# Patient Record
Sex: FEMALE | Race: WHITE
[De-identification: names, ages, dates, MRNs, and addresses within clinical notes are randomized per-mention and may not be internally consistent; named-entity substitution may affect disease eponyms.]

---

## 2021-10-05 ENCOUNTER — HOSPITAL ENCOUNTER (EMERGENCY)
Dept: HOSPITAL 94 - ER | Age: 56
Discharge: HOME | End: 2021-10-05
Payer: MEDICAID

## 2021-10-05 VITALS — BODY MASS INDEX: 37.04 KG/M2 | HEIGHT: 66 IN | WEIGHT: 230.49 LBS

## 2021-10-05 VITALS — DIASTOLIC BLOOD PRESSURE: 96 MMHG | SYSTOLIC BLOOD PRESSURE: 117 MMHG

## 2021-10-05 DIAGNOSIS — U07.1: Primary | ICD-10-CM

## 2021-10-05 DIAGNOSIS — Z88.8: ICD-10-CM

## 2021-10-05 DIAGNOSIS — G89.29: ICD-10-CM

## 2021-10-05 DIAGNOSIS — Z79.899: ICD-10-CM

## 2021-10-05 DIAGNOSIS — Z79.2: ICD-10-CM

## 2021-10-05 DIAGNOSIS — J96.01: ICD-10-CM

## 2021-10-05 DIAGNOSIS — Z88.1: ICD-10-CM

## 2021-10-05 DIAGNOSIS — J12.82: ICD-10-CM

## 2021-10-05 LAB
ALBUMIN SERPL BCP-MCNC: 3.3 G/DL (ref 3.4–5)
ALBUMIN/GLOB SERPL: 0.8 {RATIO} (ref 1.1–1.5)
ALP SERPL-CCNC: 94 IU/L (ref 46–116)
ALT SERPL W P-5'-P-CCNC: 32 U/L (ref 12–78)
AMORPH URATE CRY #/AREA URNS HPF: (no result) /[HPF]
ANION GAP SERPL CALCULATED.3IONS-SCNC: 15 MMOL/L (ref 8–16)
AST SERPL W P-5'-P-CCNC: 106 U/L (ref 10–37)
BACTERIA URNS QL MICRO: (no result) /HPF
BASOPHILS # BLD AUTO: 0 X10'3 (ref 0–0.2)
BASOPHILS NFR BLD AUTO: 0.4 % (ref 0–1)
BILIRUB SERPL-MCNC: 0.2 MG/DL (ref 0.1–1)
BUN SERPL-MCNC: 32 MG/DL (ref 7–18)
BUN/CREAT SERPL: 23.9 (ref 6.6–38)
CALCIUM SERPL-MCNC: 8.4 MG/DL (ref 8.5–10.1)
CHLORIDE SERPL-SCNC: 99 MMOL/L (ref 99–107)
CLARITY UR: (no result)
CO2 SERPL-SCNC: 19.2 MMOL/L (ref 24–32)
COLOR UR: YELLOW
CREAT SERPL-MCNC: 1.34 MG/DL (ref 0.4–0.9)
CRP SERPL HS-MCNC: 9.75 MG/DL (ref 0–0.5)
D DIMER PPP FEU-MCNC: 1.46 MG/L FEU (ref 0–0.5)
DEPRECATED SQUAMOUS URNS QL MICRO: (no result) /LPF
EOSINOPHIL # BLD AUTO: 0 X10'3 (ref 0–0.9)
EOSINOPHIL NFR BLD AUTO: 0 % (ref 0–6)
ERYTHROCYTE [DISTWIDTH] IN BLOOD BY AUTOMATED COUNT: 13.5 % (ref 11.5–14.5)
GFR SERPL CREATININE-BSD FRML MDRD: 41 ML/MIN
GLUCOSE SERPL-MCNC: 119 MG/DL (ref 70–104)
GLUCOSE UR STRIP-MCNC: NEGATIVE MG/DL
HCT VFR BLD AUTO: 39.4 % (ref 35–45)
HGB BLD-MCNC: 13.5 G/DL (ref 12–16)
HGB UR QL STRIP: (no result)
HYALINE CASTS URNS QL MICRO: (no result) /LPF
KETONES UR STRIP-MCNC: (no result) MG/DL
LEUKOCYTE ESTERASE UR QL STRIP: NEGATIVE
LYMPHOCYTES # BLD AUTO: 0.5 X10'3 (ref 1.1–4.8)
LYMPHOCYTES NFR BLD AUTO: 16.9 % (ref 21–51)
MCH RBC QN AUTO: 30.3 PG (ref 27–31)
MCHC RBC AUTO-ENTMCNC: 34.3 G/DL (ref 33–36.5)
MCV RBC AUTO: 88.2 FL (ref 78–98)
MONOCYTES # BLD AUTO: 0.1 X10'3 (ref 0–0.9)
MONOCYTES NFR BLD AUTO: 2.4 % (ref 2–12)
MUCOUS THREADS URNS QL MICRO: (no result) /LPF
NEUTROPHILS # BLD AUTO: 2.6 X10'3 (ref 1.8–7.7)
NEUTROPHILS NFR BLD AUTO: 80.3 % (ref 42–75)
NITRITE UR QL STRIP: NEGATIVE
PH UR STRIP: 6 [PH] (ref 4.8–8)
PLATELET # BLD AUTO: 163 X10'3 (ref 140–440)
PMV BLD AUTO: 8.4 FL (ref 7.4–10.4)
POTASSIUM SERPL-SCNC: 3.4 MMOL/L (ref 3.5–5.1)
PROT SERPL-MCNC: 7.5 G/DL (ref 6.4–8.2)
PROT UR QL STRIP: 100 MG/DL
RBC # BLD AUTO: 4.46 X10'6 (ref 4.2–5.6)
RBC #/AREA URNS HPF: (no result) /HPF (ref 0–2)
SODIUM SERPL-SCNC: 133 MMOL/L (ref 135–145)
SP GR UR STRIP: 1.02 (ref 1–1.03)
URN COLLECT METHOD CLASS: (no result)
UROBILINOGEN UR STRIP-MCNC: 0.2 E.U/DL (ref 0.2–1)
WBC # BLD AUTO: 3.3 X10'3 (ref 4.5–11)
WBC #/AREA URNS HPF: (no result) /HPF (ref 0–4)

## 2021-10-05 PROCEDURE — 84145 PROCALCITONIN (PCT): CPT

## 2021-10-05 PROCEDURE — 86140 C-REACTIVE PROTEIN: CPT

## 2021-10-05 PROCEDURE — 96361 HYDRATE IV INFUSION ADD-ON: CPT

## 2021-10-05 PROCEDURE — 85379 FIBRIN DEGRADATION QUANT: CPT

## 2021-10-05 PROCEDURE — 87088 URINE BACTERIA CULTURE: CPT

## 2021-10-05 PROCEDURE — 96374 THER/PROPH/DIAG INJ IV PUSH: CPT

## 2021-10-05 PROCEDURE — 81001 URINALYSIS AUTO W/SCOPE: CPT

## 2021-10-05 PROCEDURE — 99284 EMERGENCY DEPT VISIT MOD MDM: CPT

## 2021-10-05 PROCEDURE — 85025 COMPLETE CBC W/AUTO DIFF WBC: CPT

## 2021-10-05 PROCEDURE — 36415 COLL VENOUS BLD VENIPUNCTURE: CPT

## 2021-10-05 PROCEDURE — 87635 SARS-COV-2 COVID-19 AMP PRB: CPT

## 2021-10-05 PROCEDURE — 96375 TX/PRO/DX INJ NEW DRUG ADDON: CPT

## 2021-10-05 PROCEDURE — 80053 COMPREHEN METABOLIC PANEL: CPT

## 2021-10-05 PROCEDURE — 71045 X-RAY EXAM CHEST 1 VIEW: CPT

## 2021-10-07 ENCOUNTER — HOSPITAL ENCOUNTER (INPATIENT)
Dept: HOSPITAL 94 - ER | Age: 56
LOS: 32 days | DRG: 130 | End: 2021-11-08
Attending: FAMILY MEDICINE | Admitting: FAMILY MEDICINE
Payer: MEDICAID

## 2021-10-07 VITALS — DIASTOLIC BLOOD PRESSURE: 68 MMHG | SYSTOLIC BLOOD PRESSURE: 125 MMHG

## 2021-10-07 VITALS — HEIGHT: 66 IN | WEIGHT: 231.49 LBS | BODY MASS INDEX: 37.2 KG/M2

## 2021-10-07 VITALS — SYSTOLIC BLOOD PRESSURE: 120 MMHG | DIASTOLIC BLOOD PRESSURE: 59 MMHG

## 2021-10-07 DIAGNOSIS — U07.1: Primary | ICD-10-CM

## 2021-10-07 DIAGNOSIS — M54.9: ICD-10-CM

## 2021-10-07 DIAGNOSIS — Z88.8: ICD-10-CM

## 2021-10-07 DIAGNOSIS — G89.29: ICD-10-CM

## 2021-10-07 DIAGNOSIS — Z51.5: ICD-10-CM

## 2021-10-07 DIAGNOSIS — Z87.891: ICD-10-CM

## 2021-10-07 DIAGNOSIS — K21.9: ICD-10-CM

## 2021-10-07 DIAGNOSIS — R65.21: ICD-10-CM

## 2021-10-07 DIAGNOSIS — I95.9: ICD-10-CM

## 2021-10-07 DIAGNOSIS — F41.0: ICD-10-CM

## 2021-10-07 DIAGNOSIS — F40.240: ICD-10-CM

## 2021-10-07 DIAGNOSIS — E87.6: ICD-10-CM

## 2021-10-07 DIAGNOSIS — A41.02: ICD-10-CM

## 2021-10-07 DIAGNOSIS — B95.62: ICD-10-CM

## 2021-10-07 DIAGNOSIS — R74.01: ICD-10-CM

## 2021-10-07 DIAGNOSIS — Z79.899: ICD-10-CM

## 2021-10-07 DIAGNOSIS — J12.82: ICD-10-CM

## 2021-10-07 DIAGNOSIS — E66.9: ICD-10-CM

## 2021-10-07 DIAGNOSIS — R73.9: ICD-10-CM

## 2021-10-07 DIAGNOSIS — Z78.1: ICD-10-CM

## 2021-10-07 DIAGNOSIS — Z90.710: ICD-10-CM

## 2021-10-07 DIAGNOSIS — Z88.5: ICD-10-CM

## 2021-10-07 DIAGNOSIS — I21.A1: ICD-10-CM

## 2021-10-07 DIAGNOSIS — R00.0: ICD-10-CM

## 2021-10-07 DIAGNOSIS — J96.01: ICD-10-CM

## 2021-10-07 DIAGNOSIS — J15.212: ICD-10-CM

## 2021-10-07 DIAGNOSIS — D64.9: ICD-10-CM

## 2021-10-07 LAB
ALBUMIN SERPL BCP-MCNC: 3.3 G/DL (ref 3.4–5)
ALBUMIN/GLOB SERPL: 0.8 {RATIO} (ref 1.1–1.5)
ALP SERPL-CCNC: 117 IU/L (ref 46–116)
ALT SERPL W P-5'-P-CCNC: 247 U/L (ref 12–78)
ANION GAP SERPL CALCULATED.3IONS-SCNC: 14 MMOL/L (ref 8–16)
AST SERPL W P-5'-P-CCNC: 293 U/L (ref 10–37)
BASOPHILS # BLD AUTO: 0 X10'3 (ref 0–0.2)
BASOPHILS NFR BLD AUTO: 0.1 % (ref 0–1)
BILIRUB SERPL-MCNC: 0.2 MG/DL (ref 0.1–1)
BUN SERPL-MCNC: 18 MG/DL (ref 7–18)
BUN/CREAT SERPL: 23.4 (ref 6.6–38)
CALCIUM SERPL-MCNC: 8.9 MG/DL (ref 8.5–10.1)
CHLORIDE SERPL-SCNC: 105 MMOL/L (ref 99–107)
CO2 SERPL-SCNC: 21.5 MMOL/L (ref 24–32)
CREAT SERPL-MCNC: 0.77 MG/DL (ref 0.4–0.9)
CRP SERPL HS-MCNC: 1.21 MG/DL (ref 0–0.5)
D DIMER PPP FEU-MCNC: 2.19 MG/L FEU (ref 0–0.5)
EOSINOPHIL # BLD AUTO: 0 X10'3 (ref 0–0.9)
EOSINOPHIL NFR BLD AUTO: 0 % (ref 0–6)
ERYTHROCYTE [DISTWIDTH] IN BLOOD BY AUTOMATED COUNT: 14 % (ref 11.5–14.5)
GFR SERPL CREATININE-BSD FRML MDRD: 78 ML/MIN
GLUCOSE SERPL-MCNC: 138 MG/DL (ref 70–104)
HCT VFR BLD AUTO: 40 % (ref 35–45)
HGB BLD-MCNC: 13.7 G/DL (ref 12–16)
LDH SERPL-CCNC: 1717 U/L (ref 81–234)
LYMPHOCYTES # BLD AUTO: 0.6 X10'3 (ref 1.1–4.8)
LYMPHOCYTES NFR BLD AUTO: 12.3 % (ref 21–51)
MAGNESIUM SERPL-MCNC: 2 MG/DL (ref 1.5–2.4)
MCH RBC QN AUTO: 30.2 PG (ref 27–31)
MCHC RBC AUTO-ENTMCNC: 34.4 G/DL (ref 33–36.5)
MCV RBC AUTO: 87.8 FL (ref 78–98)
MONOCYTES # BLD AUTO: 0.3 X10'3 (ref 0–0.9)
MONOCYTES NFR BLD AUTO: 6 % (ref 2–12)
NEUTROPHILS # BLD AUTO: 4 X10'3 (ref 1.8–7.7)
NEUTROPHILS NFR BLD AUTO: 81.6 % (ref 42–75)
PLATELET # BLD AUTO: 191 X10'3 (ref 140–440)
PMV BLD AUTO: 8.2 FL (ref 7.4–10.4)
POTASSIUM SERPL-SCNC: 3.9 MMOL/L (ref 3.5–5.1)
PROT SERPL-MCNC: 7.4 G/DL (ref 6.4–8.2)
RBC # BLD AUTO: 4.55 X10'6 (ref 4.2–5.6)
SODIUM SERPL-SCNC: 140 MMOL/L (ref 135–145)
WBC # BLD AUTO: 4.9 X10'3 (ref 4.5–11)

## 2021-10-07 PROCEDURE — 36600 WITHDRAWAL OF ARTERIAL BLOOD: CPT

## 2021-10-07 PROCEDURE — 84484 ASSAY OF TROPONIN QUANT: CPT

## 2021-10-07 PROCEDURE — 80202 ASSAY OF VANCOMYCIN: CPT

## 2021-10-07 PROCEDURE — 76700 US EXAM ABDOM COMPLETE: CPT

## 2021-10-07 PROCEDURE — 71275 CT ANGIOGRAPHY CHEST: CPT

## 2021-10-07 PROCEDURE — 92950 HEART/LUNG RESUSCITATION CPR: CPT

## 2021-10-07 PROCEDURE — 84478 ASSAY OF TRIGLYCERIDES: CPT

## 2021-10-07 PROCEDURE — 84134 ASSAY OF PREALBUMIN: CPT

## 2021-10-07 PROCEDURE — 86140 C-REACTIVE PROTEIN: CPT

## 2021-10-07 PROCEDURE — 94640 AIRWAY INHALATION TREATMENT: CPT

## 2021-10-07 PROCEDURE — 85025 COMPLETE CBC W/AUTO DIFF WBC: CPT

## 2021-10-07 PROCEDURE — 93971 EXTREMITY STUDY: CPT

## 2021-10-07 PROCEDURE — B3201ZZ COMPUTERIZED TOMOGRAPHY (CT SCAN) OF THORACIC AORTA USING LOW OSMOLAR CONTRAST: ICD-10-PCS

## 2021-10-07 PROCEDURE — 93306 TTE W/DOPPLER COMPLETE: CPT

## 2021-10-07 PROCEDURE — 82803 BLOOD GASES ANY COMBINATION: CPT

## 2021-10-07 PROCEDURE — 36415 COLL VENOUS BLD VENIPUNCTURE: CPT

## 2021-10-07 PROCEDURE — 87077 CULTURE AEROBIC IDENTIFY: CPT

## 2021-10-07 PROCEDURE — 80053 COMPREHEN METABOLIC PANEL: CPT

## 2021-10-07 PROCEDURE — 76937 US GUIDE VASCULAR ACCESS: CPT

## 2021-10-07 PROCEDURE — 85610 PROTHROMBIN TIME: CPT

## 2021-10-07 PROCEDURE — 97110 THERAPEUTIC EXERCISES: CPT

## 2021-10-07 PROCEDURE — B32S1ZZ COMPUTERIZED TOMOGRAPHY (CT SCAN) OF RIGHT PULMONARY ARTERY USING LOW OSMOLAR CONTRAST: ICD-10-PCS

## 2021-10-07 PROCEDURE — 87081 CULTURE SCREEN ONLY: CPT

## 2021-10-07 PROCEDURE — 93970 EXTREMITY STUDY: CPT

## 2021-10-07 PROCEDURE — 83735 ASSAY OF MAGNESIUM: CPT

## 2021-10-07 PROCEDURE — 83605 ASSAY OF LACTIC ACID: CPT

## 2021-10-07 PROCEDURE — 93005 ELECTROCARDIOGRAM TRACING: CPT

## 2021-10-07 PROCEDURE — B32T1ZZ COMPUTERIZED TOMOGRAPHY (CT SCAN) OF LEFT PULMONARY ARTERY USING LOW OSMOLAR CONTRAST: ICD-10-PCS

## 2021-10-07 PROCEDURE — 85018 HEMOGLOBIN: CPT

## 2021-10-07 PROCEDURE — 99285 EMERGENCY DEPT VISIT HI MDM: CPT

## 2021-10-07 PROCEDURE — 85027 COMPLETE CBC AUTOMATED: CPT

## 2021-10-07 PROCEDURE — 36573 INSJ PICC RS&I 5 YR+: CPT

## 2021-10-07 PROCEDURE — 71045 X-RAY EXAM CHEST 1 VIEW: CPT

## 2021-10-07 PROCEDURE — 85007 BL SMEAR W/DIFF WBC COUNT: CPT

## 2021-10-07 PROCEDURE — 84100 ASSAY OF PHOSPHORUS: CPT

## 2021-10-07 PROCEDURE — 94760 N-INVAS EAR/PLS OXIMETRY 1: CPT

## 2021-10-07 PROCEDURE — 87040 BLOOD CULTURE FOR BACTERIA: CPT

## 2021-10-07 PROCEDURE — 85730 THROMBOPLASTIN TIME PARTIAL: CPT

## 2021-10-07 PROCEDURE — 82948 REAGENT STRIP/BLOOD GLUCOSE: CPT

## 2021-10-07 PROCEDURE — 83615 LACTATE (LD) (LDH) ENZYME: CPT

## 2021-10-07 PROCEDURE — 87070 CULTURE OTHR SPECIMN AEROBIC: CPT

## 2021-10-07 PROCEDURE — 87186 SC STD MICRODIL/AGAR DIL: CPT

## 2021-10-07 PROCEDURE — 5A0935A ASSISTANCE WITH RESPIRATORY VENTILATION, LESS THAN 24 CONSECUTIVE HOURS, HIGH NASAL FLOW/VELOCITY: ICD-10-PCS | Performed by: INTERNAL MEDICINE

## 2021-10-07 PROCEDURE — 81001 URINALYSIS AUTO W/SCOPE: CPT

## 2021-10-07 PROCEDURE — 83880 ASSAY OF NATRIURETIC PEPTIDE: CPT

## 2021-10-07 PROCEDURE — 94002 VENT MGMT INPAT INIT DAY: CPT

## 2021-10-07 PROCEDURE — 97535 SELF CARE MNGMENT TRAINING: CPT

## 2021-10-07 PROCEDURE — 97530 THERAPEUTIC ACTIVITIES: CPT

## 2021-10-07 PROCEDURE — 83036 HEMOGLOBIN GLYCOSYLATED A1C: CPT

## 2021-10-07 PROCEDURE — 94003 VENT MGMT INPAT SUBQ DAY: CPT

## 2021-10-07 PROCEDURE — 85379 FIBRIN DEGRADATION QUANT: CPT

## 2021-10-07 PROCEDURE — 94660 CPAP INITIATION&MGMT: CPT

## 2021-10-07 PROCEDURE — 84132 ASSAY OF SERUM POTASSIUM: CPT

## 2021-10-07 PROCEDURE — 80048 BASIC METABOLIC PNL TOTAL CA: CPT

## 2021-10-07 PROCEDURE — 94799 UNLISTED PULMONARY SVC/PX: CPT

## 2021-10-07 PROCEDURE — 84145 PROCALCITONIN (PCT): CPT

## 2021-10-07 RX ADMIN — DOCUSATE SODIUM SCH MG: 100 CAPSULE, LIQUID FILLED ORAL at 21:22

## 2021-10-07 RX ADMIN — SODIUM CHLORIDE SCH MLS/HR: 9 INJECTION INTRAMUSCULAR; INTRAVENOUS; SUBCUTANEOUS at 15:47

## 2021-10-07 RX ADMIN — HYDROMORPHONE HYDROCHLORIDE PRN MG: 1 INJECTION, SOLUTION INTRAMUSCULAR; INTRAVENOUS; SUBCUTANEOUS at 21:25

## 2021-10-07 RX ADMIN — ENOXAPARIN SODIUM SCH MG: 100 INJECTION SUBCUTANEOUS at 21:24

## 2021-10-07 RX ADMIN — GUAIFENESIN AND DEXTROMETHORPHAN PRN ML: 100; 10 SYRUP ORAL at 18:00

## 2021-10-07 RX ADMIN — DEXTROSE SCH MLS/HR: 50 INJECTION, SOLUTION INTRAVENOUS at 21:24

## 2021-10-07 NOTE — NUR
ALSO HAS 15LITERS OF OXYGEN THROUGH NRB MASK ON 

-------------------------------------------------------------------------------

Addendum: 10/07/21 at 2155 by Poly Marquez RN

-------------------------------------------------------------------------------

Amended: Links added.

## 2021-10-07 NOTE — NUR
Patient in room ORTHO 4012. I have received report from ABISAI Leon   and had the 
opportunity to ask questions and assume patient care.

## 2021-10-07 NOTE — NUR
Patient was found on her knees on the floor next to the bedside commode, called charge nurse 
and with her assistance patient was transferred back to bed. Skin intact at this time, able 
to move all extremities without difficulty. Bed alarm in place now, bed in lowest position, 
side rails in place. Instructed patient to use call light for assistance. Dr Salgado was 
made aware by charge nurse. Will continue to monitor, call light within reach.

## 2021-10-07 NOTE — NUR
SENT MESSAGE TO RESPIRATORY...



1968E...PT COMING UP FROM ER, ON 15L...CAN I GET A SALTER FOR THE ROOM PLEASE AND THANK YOU

## 2021-10-07 NOTE — NUR
Patient was found on the floor by her nurse, Soledad BARONE. I called Dr. Salgado to inform him 
of the fall and that her oxygen saturation had dropped to 56% without oxygen. I applied the 
15liters high flow and then added a 15liter NRB mask because her oxygen saturation was 
hanging out around 75% and it slowly went up to 91% with both the HF and NRB mask on. I set 
the bed alarm on her bed and reinforced that she needed to call for help to the bsc and not 
to get out of bed on her own. There was no injury noted to her body and no open sores or 
abrasions, she was found on her knees by the bedside commode. She verbalized that she stood 
up from [facility] and she just became weak and fell onto her knees. I asked the doctor for Toradol 
per her request for pain but he declined since she is on steroids and it would increase risk 
of gi bleed.

## 2021-10-07 NOTE — NUR
Problems reprioritized. Patient report given, questions answered & plan of care reviewed 
with ABISAI GOMEZ.

## 2021-10-07 NOTE — NUR
I have received report from  ABISAI NGUYỄN IN ER  and had the opportunity to ask questions. 
WILL AWAIT PTS ARRIVAL TO THE FLOOR.

## 2021-10-07 NOTE — NUR
IV FLUIDS NOT INFUSING DUE TO IV BEING IN R AC, PT KEEPS BENDING ARM. WILL HAVE NOC RN PLACE 
NEW IV AND RESTART ABX AND FLUID.

## 2021-10-08 VITALS — SYSTOLIC BLOOD PRESSURE: 117 MMHG | DIASTOLIC BLOOD PRESSURE: 63 MMHG

## 2021-10-08 VITALS — SYSTOLIC BLOOD PRESSURE: 124 MMHG | DIASTOLIC BLOOD PRESSURE: 63 MMHG

## 2021-10-08 VITALS — DIASTOLIC BLOOD PRESSURE: 63 MMHG | SYSTOLIC BLOOD PRESSURE: 117 MMHG

## 2021-10-08 VITALS — DIASTOLIC BLOOD PRESSURE: 73 MMHG | SYSTOLIC BLOOD PRESSURE: 103 MMHG

## 2021-10-08 VITALS — DIASTOLIC BLOOD PRESSURE: 51 MMHG | SYSTOLIC BLOOD PRESSURE: 118 MMHG

## 2021-10-08 VITALS — DIASTOLIC BLOOD PRESSURE: 53 MMHG | SYSTOLIC BLOOD PRESSURE: 106 MMHG

## 2021-10-08 VITALS — SYSTOLIC BLOOD PRESSURE: 116 MMHG | DIASTOLIC BLOOD PRESSURE: 65 MMHG

## 2021-10-08 LAB
ALBUMIN SERPL BCP-MCNC: 2.9 G/DL (ref 3.4–5)
ALBUMIN/GLOB SERPL: 0.8 {RATIO} (ref 1.1–1.5)
ALP SERPL-CCNC: 113 IU/L (ref 46–116)
ALT SERPL W P-5'-P-CCNC: 173 U/L (ref 12–78)
ANION GAP SERPL CALCULATED.3IONS-SCNC: 12 MMOL/L (ref 8–16)
APTT PPP: 26 SECONDS (ref 22–32)
AST SERPL W P-5'-P-CCNC: 152 U/L (ref 10–37)
BASOPHILS # BLD AUTO: 0 X10'3 (ref 0–0.2)
BASOPHILS NFR BLD AUTO: 0.1 % (ref 0–1)
BILIRUB SERPL-MCNC: 0.2 MG/DL (ref 0.1–1)
BUN SERPL-MCNC: 14 MG/DL (ref 7–18)
BUN/CREAT SERPL: 17.9 (ref 6.6–38)
CALCIUM SERPL-MCNC: 8.3 MG/DL (ref 8.5–10.1)
CHLORIDE SERPL-SCNC: 108 MMOL/L (ref 99–107)
CO2 SERPL-SCNC: 21.3 MMOL/L (ref 24–32)
CREAT SERPL-MCNC: 0.78 MG/DL (ref 0.4–0.9)
CRP SERPL HS-MCNC: 0.33 MG/DL (ref 0–0.5)
D DIMER PPP FEU-MCNC: 1.66 MG/L FEU (ref 0–0.5)
EOSINOPHIL # BLD AUTO: 0 X10'3 (ref 0–0.9)
EOSINOPHIL NFR BLD AUTO: 0 % (ref 0–6)
ERYTHROCYTE [DISTWIDTH] IN BLOOD BY AUTOMATED COUNT: 14.4 % (ref 11.5–14.5)
GFR SERPL CREATININE-BSD FRML MDRD: 76 ML/MIN
GLUCOSE SERPL-MCNC: 134 MG/DL (ref 70–104)
HCT VFR BLD AUTO: 37.4 % (ref 35–45)
HGB BLD-MCNC: 12.8 G/DL (ref 12–16)
LDH SERPL-CCNC: 1369 U/L (ref 81–234)
LYMPHOCYTES # BLD AUTO: 0.5 X10'3 (ref 1.1–4.8)
LYMPHOCYTES NFR BLD AUTO: 9.4 % (ref 21–51)
MAGNESIUM SERPL-MCNC: 2.1 MG/DL (ref 1.5–2.4)
MCH RBC QN AUTO: 30.4 PG (ref 27–31)
MCHC RBC AUTO-ENTMCNC: 34.2 G/DL (ref 33–36.5)
MCV RBC AUTO: 88.7 FL (ref 78–98)
MONOCYTES # BLD AUTO: 0.3 X10'3 (ref 0–0.9)
MONOCYTES NFR BLD AUTO: 6.3 % (ref 2–12)
NEUTROPHILS # BLD AUTO: 4 X10'3 (ref 1.8–7.7)
NEUTROPHILS NFR BLD AUTO: 84.2 % (ref 42–75)
PLATELET # BLD AUTO: 176 X10'3 (ref 140–440)
PMV BLD AUTO: 8.5 FL (ref 7.4–10.4)
POTASSIUM SERPL-SCNC: 3.8 MMOL/L (ref 3.5–5.1)
PROT SERPL-MCNC: 6.5 G/DL (ref 6.4–8.2)
RBC # BLD AUTO: 4.22 X10'6 (ref 4.2–5.6)
SODIUM SERPL-SCNC: 141 MMOL/L (ref 135–145)
TROPONIN I SERPL-MCNC: 0.59 NG/ML (ref 0–0.05)
WBC # BLD AUTO: 4.8 X10'3 (ref 4.5–11)

## 2021-10-08 PROCEDURE — 5A0935A ASSISTANCE WITH RESPIRATORY VENTILATION, LESS THAN 24 CONSECUTIVE HOURS, HIGH NASAL FLOW/VELOCITY: ICD-10-PCS | Performed by: INTERNAL MEDICINE

## 2021-10-08 PROCEDURE — XW033E5 INTRODUCTION OF REMDESIVIR ANTI-INFECTIVE INTO PERIPHERAL VEIN, PERCUTANEOUS APPROACH, NEW TECHNOLOGY GROUP 5: ICD-10-PCS | Performed by: INTERNAL MEDICINE

## 2021-10-08 RX ADMIN — DOCUSATE SODIUM SCH MG: 100 CAPSULE, LIQUID FILLED ORAL at 19:37

## 2021-10-08 RX ADMIN — GUAIFENESIN AND DEXTROMETHORPHAN PRN ML: 100; 10 SYRUP ORAL at 19:37

## 2021-10-08 RX ADMIN — ENOXAPARIN SODIUM SCH MG: 100 INJECTION SUBCUTANEOUS at 08:18

## 2021-10-08 RX ADMIN — Medication SCH MMU: at 19:37

## 2021-10-08 RX ADMIN — SODIUM CHLORIDE SCH MLS/HR: 9 INJECTION INTRAMUSCULAR; INTRAVENOUS; SUBCUTANEOUS at 00:25

## 2021-10-08 RX ADMIN — GUAIFENESIN AND DEXTROMETHORPHAN PRN ML: 100; 10 SYRUP ORAL at 23:49

## 2021-10-08 RX ADMIN — ENOXAPARIN SODIUM SCH MG: 60 INJECTION SUBCUTANEOUS at 19:36

## 2021-10-08 RX ADMIN — HYDROCODONE BITARTRATE AND ACETAMINOPHEN PRN ML: 7.5; 325 SOLUTION ORAL at 19:37

## 2021-10-08 RX ADMIN — GUAIFENESIN AND DEXTROMETHORPHAN PRN ML: 100; 10 SYRUP ORAL at 06:12

## 2021-10-08 RX ADMIN — GUAIFENESIN AND DEXTROMETHORPHAN PRN ML: 100; 10 SYRUP ORAL at 12:59

## 2021-10-08 RX ADMIN — DOCUSATE SODIUM SCH MG: 100 CAPSULE, LIQUID FILLED ORAL at 08:17

## 2021-10-08 RX ADMIN — GUAIFENESIN AND DEXTROMETHORPHAN PRN ML: 100; 10 SYRUP ORAL at 01:50

## 2021-10-08 RX ADMIN — DEXTROSE SCH MLS/HR: 50 INJECTION, SOLUTION INTRAVENOUS at 08:16

## 2021-10-08 RX ADMIN — PANTOPRAZOLE SODIUM SCH MG: 40 TABLET, DELAYED RELEASE ORAL at 08:17

## 2021-10-08 NOTE — NUR
Assisted patient in prone position to help her breathing, she was having low oxygen 
saturations with any movements and dropping down to the high 70's. Now she is 90-91% in 
prone position and on 15liters high flow and 15 liters nrb mask. I had her use the bedpan 
because she decompensates to much with activity and not safe to use the bsc at this time.

## 2021-10-08 NOTE — NUR
Problems reprioritized. Patient report given, questions answered & plan of care reviewed 
with ABISAI Peaz.

## 2021-10-08 NOTE — NUR
also on 15 liters NRB mask

-------------------------------------------------------------------------------

Addendum: 10/08/21 at 1931 by Poly Marquez RN

-------------------------------------------------------------------------------

Amended: Links added.

## 2021-10-08 NOTE — NUR
Dr. Salgado was called and notified that we are unable to get the last troponin that was 
ordered on patient, 3 nurses tried getting the blood without any success. He said to just 
have it done with am labs.

## 2021-10-09 VITALS — DIASTOLIC BLOOD PRESSURE: 76 MMHG | SYSTOLIC BLOOD PRESSURE: 139 MMHG

## 2021-10-09 VITALS — DIASTOLIC BLOOD PRESSURE: 70 MMHG | SYSTOLIC BLOOD PRESSURE: 131 MMHG

## 2021-10-09 VITALS — DIASTOLIC BLOOD PRESSURE: 69 MMHG | SYSTOLIC BLOOD PRESSURE: 102 MMHG

## 2021-10-09 VITALS — DIASTOLIC BLOOD PRESSURE: 65 MMHG | SYSTOLIC BLOOD PRESSURE: 126 MMHG

## 2021-10-09 VITALS — SYSTOLIC BLOOD PRESSURE: 114 MMHG | DIASTOLIC BLOOD PRESSURE: 63 MMHG

## 2021-10-09 VITALS — DIASTOLIC BLOOD PRESSURE: 67 MMHG | SYSTOLIC BLOOD PRESSURE: 134 MMHG

## 2021-10-09 LAB
ALBUMIN SERPL BCP-MCNC: 2.9 G/DL (ref 3.4–5)
ALBUMIN/GLOB SERPL: 0.8 {RATIO} (ref 1.1–1.5)
ALP SERPL-CCNC: 122 IU/L (ref 46–116)
ALT SERPL W P-5'-P-CCNC: 119 U/L (ref 12–78)
ANION GAP SERPL CALCULATED.3IONS-SCNC: 13 MMOL/L (ref 8–16)
APTT PPP: 25 SECONDS (ref 22–32)
AST SERPL W P-5'-P-CCNC: 85 U/L (ref 10–37)
BASOPHILS # BLD AUTO: 0 X10'3 (ref 0–0.2)
BASOPHILS NFR BLD AUTO: 0.1 % (ref 0–1)
BILIRUB SERPL-MCNC: 0.4 MG/DL (ref 0.1–1)
BUN SERPL-MCNC: 11 MG/DL (ref 7–18)
BUN/CREAT SERPL: 14.7 (ref 6.6–38)
CALCIUM SERPL-MCNC: 8.6 MG/DL (ref 8.5–10.1)
CHLORIDE SERPL-SCNC: 109 MMOL/L (ref 99–107)
CO2 SERPL-SCNC: 22.5 MMOL/L (ref 24–32)
CREAT SERPL-MCNC: 0.75 MG/DL (ref 0.4–0.9)
CRP SERPL HS-MCNC: 0.06 MG/DL (ref 0–0.5)
D DIMER PPP FEU-MCNC: 1.73 MG/L FEU (ref 0–0.5)
EOSINOPHIL # BLD AUTO: 0 X10'3 (ref 0–0.9)
EOSINOPHIL NFR BLD AUTO: 0 % (ref 0–6)
ERYTHROCYTE [DISTWIDTH] IN BLOOD BY AUTOMATED COUNT: 14.3 % (ref 11.5–14.5)
GFR SERPL CREATININE-BSD FRML MDRD: 80 ML/MIN
GLUCOSE SERPL-MCNC: 114 MG/DL (ref 70–104)
HCT VFR BLD AUTO: 38.9 % (ref 35–45)
HGB BLD-MCNC: 13.4 G/DL (ref 12–16)
LDH SERPL-CCNC: 1374 U/L (ref 81–234)
LYMPHOCYTES # BLD AUTO: 0.7 X10'3 (ref 1.1–4.8)
LYMPHOCYTES NFR BLD AUTO: 12.1 % (ref 21–51)
MAGNESIUM SERPL-MCNC: 2.3 MG/DL (ref 1.5–2.4)
MCH RBC QN AUTO: 30.3 PG (ref 27–31)
MCHC RBC AUTO-ENTMCNC: 34.4 G/DL (ref 33–36.5)
MCV RBC AUTO: 88.1 FL (ref 78–98)
MONOCYTES # BLD AUTO: 0.5 X10'3 (ref 0–0.9)
MONOCYTES NFR BLD AUTO: 9.5 % (ref 2–12)
NEUTROPHILS # BLD AUTO: 4.3 X10'3 (ref 1.8–7.7)
NEUTROPHILS NFR BLD AUTO: 78.3 % (ref 42–75)
PLATELET # BLD AUTO: 184 X10'3 (ref 140–440)
PMV BLD AUTO: 7.9 FL (ref 7.4–10.4)
POTASSIUM SERPL-SCNC: 3.7 MMOL/L (ref 3.5–5.1)
PROT SERPL-MCNC: 6.6 G/DL (ref 6.4–8.2)
RBC # BLD AUTO: 4.42 X10'6 (ref 4.2–5.6)
SODIUM SERPL-SCNC: 144 MMOL/L (ref 135–145)
WBC # BLD AUTO: 5.4 X10'3 (ref 4.5–11)

## 2021-10-09 PROCEDURE — 5A0935A ASSISTANCE WITH RESPIRATORY VENTILATION, LESS THAN 24 CONSECUTIVE HOURS, HIGH NASAL FLOW/VELOCITY: ICD-10-PCS | Performed by: INTERNAL MEDICINE

## 2021-10-09 RX ADMIN — Medication SCH MLS/HR: at 07:05

## 2021-10-09 RX ADMIN — Medication SCH MMU: at 07:06

## 2021-10-09 RX ADMIN — ENOXAPARIN SODIUM SCH MG: 60 INJECTION SUBCUTANEOUS at 07:05

## 2021-10-09 RX ADMIN — PANTOPRAZOLE SODIUM SCH MG: 40 TABLET, DELAYED RELEASE ORAL at 07:05

## 2021-10-09 RX ADMIN — GUAIFENESIN AND DEXTROMETHORPHAN PRN ML: 100; 10 SYRUP ORAL at 18:46

## 2021-10-09 RX ADMIN — Medication SCH MMU: at 19:50

## 2021-10-09 RX ADMIN — DOCUSATE SODIUM SCH MG: 100 CAPSULE, LIQUID FILLED ORAL at 19:46

## 2021-10-09 RX ADMIN — HYDROCODONE BITARTRATE AND ACETAMINOPHEN PRN ML: 7.5; 325 SOLUTION ORAL at 19:50

## 2021-10-09 RX ADMIN — CEFTRIAXONE SCH MLS/HR: 2 INJECTION, SOLUTION INTRAVENOUS at 07:05

## 2021-10-09 RX ADMIN — DOCUSATE SODIUM SCH MG: 100 CAPSULE, LIQUID FILLED ORAL at 07:06

## 2021-10-09 RX ADMIN — HYDROCODONE BITARTRATE AND ACETAMINOPHEN PRN ML: 7.5; 325 SOLUTION ORAL at 12:04

## 2021-10-09 RX ADMIN — GUAIFENESIN AND DEXTROMETHORPHAN PRN ML: 100; 10 SYRUP ORAL at 10:17

## 2021-10-09 RX ADMIN — ENOXAPARIN SODIUM SCH MG: 60 INJECTION SUBCUTANEOUS at 19:50

## 2021-10-09 RX ADMIN — DEXTROSE SCH MLS/HR: 50 INJECTION, SOLUTION INTRAVENOUS at 07:05

## 2021-10-09 RX ADMIN — GUAIFENESIN AND DEXTROMETHORPHAN PRN ML: 100; 10 SYRUP ORAL at 04:59

## 2021-10-09 NOTE — NUR
PAGER ID:  [phone] 

MESSAGE:  [id number] Edis. Just FYI preliminary sputum culture is showing yeast, rare gram 
negative rods and rare gram positive cocci in pairs. Sells [phone]

## 2021-10-09 NOTE — NUR
Educated  patient on importance of proning this morning. While doing AM med pass patient was 
assisted to the bedside commode and then into prone position.

## 2021-10-09 NOTE — NUR
Patient in room ORTHO 4012. I have received report from ABISAI Nelson   and had the opportunity 
to ask questions and assume patient care.

## 2021-10-09 NOTE — NUR
Problems reprioritized. Patient report given, questions answered & plan of care reviewed 
with Mine BARONE.

## 2021-10-10 VITALS — DIASTOLIC BLOOD PRESSURE: 68 MMHG | SYSTOLIC BLOOD PRESSURE: 113 MMHG

## 2021-10-10 VITALS — DIASTOLIC BLOOD PRESSURE: 59 MMHG | SYSTOLIC BLOOD PRESSURE: 130 MMHG

## 2021-10-10 VITALS — SYSTOLIC BLOOD PRESSURE: 134 MMHG | DIASTOLIC BLOOD PRESSURE: 56 MMHG

## 2021-10-10 VITALS — SYSTOLIC BLOOD PRESSURE: 124 MMHG | DIASTOLIC BLOOD PRESSURE: 79 MMHG

## 2021-10-10 VITALS — DIASTOLIC BLOOD PRESSURE: 56 MMHG | SYSTOLIC BLOOD PRESSURE: 110 MMHG

## 2021-10-10 VITALS — DIASTOLIC BLOOD PRESSURE: 75 MMHG | SYSTOLIC BLOOD PRESSURE: 135 MMHG

## 2021-10-10 LAB
ALBUMIN SERPL BCP-MCNC: 2.9 G/DL (ref 3.4–5)
ALBUMIN/GLOB SERPL: 0.8 {RATIO} (ref 1.1–1.5)
ALP SERPL-CCNC: 123 IU/L (ref 46–116)
ALT SERPL W P-5'-P-CCNC: 99 U/L (ref 12–78)
ANION GAP SERPL CALCULATED.3IONS-SCNC: 12 MMOL/L (ref 8–16)
APTT PPP: 24 SECONDS (ref 22–32)
AST SERPL W P-5'-P-CCNC: 80 U/L (ref 10–37)
BASOPHILS # BLD AUTO: 0 X10'3 (ref 0–0.2)
BASOPHILS NFR BLD AUTO: 0.1 % (ref 0–1)
BILIRUB SERPL-MCNC: 0.5 MG/DL (ref 0.1–1)
BUN SERPL-MCNC: 8 MG/DL (ref 7–18)
BUN/CREAT SERPL: 11.4 (ref 6.6–38)
CALCIUM SERPL-MCNC: 8.7 MG/DL (ref 8.5–10.1)
CHLORIDE SERPL-SCNC: 106 MMOL/L (ref 99–107)
CO2 SERPL-SCNC: 23.6 MMOL/L (ref 24–32)
CREAT SERPL-MCNC: 0.7 MG/DL (ref 0.4–0.9)
CRP SERPL HS-MCNC: 0.34 MG/DL (ref 0–0.5)
D DIMER PPP FEU-MCNC: 5.67 MG/L FEU (ref 0–0.5)
EOSINOPHIL # BLD AUTO: 0 X10'3 (ref 0–0.9)
EOSINOPHIL NFR BLD AUTO: 0.2 % (ref 0–6)
ERYTHROCYTE [DISTWIDTH] IN BLOOD BY AUTOMATED COUNT: 14 % (ref 11.5–14.5)
GFR SERPL CREATININE-BSD FRML MDRD: 87 ML/MIN
GLUCOSE SERPL-MCNC: 94 MG/DL (ref 70–104)
HCT VFR BLD AUTO: 41.4 % (ref 35–45)
HGB BLD-MCNC: 14.1 G/DL (ref 12–16)
LDH SERPL-CCNC: 1375 U/L (ref 81–234)
LYMPHOCYTES # BLD AUTO: 0.7 X10'3 (ref 1.1–4.8)
LYMPHOCYTES NFR BLD AUTO: 10 % (ref 21–51)
LYMPHOCYTES NFR BLD MANUAL: 5 % (ref 21–51)
MAGNESIUM SERPL-MCNC: 2.4 MG/DL (ref 1.5–2.4)
MCH RBC QN AUTO: 30.3 PG (ref 27–31)
MCHC RBC AUTO-ENTMCNC: 34.1 G/DL (ref 33–36.5)
MCV RBC AUTO: 88.9 FL (ref 78–98)
METAMYELOCYTES NFR BLD MANUAL: 1 % (ref 0–0)
MONOCYTES # BLD AUTO: 0.4 X10'3 (ref 0–0.9)
MONOCYTES NFR BLD AUTO: 4.9 % (ref 2–12)
MONOCYTES NFR BLD MANUAL: 6 % (ref 2–12)
NEUTROPHILS # BLD AUTO: 6.1 X10'3 (ref 1.8–7.7)
NEUTROPHILS NFR BLD AUTO: 84.8 % (ref 42–75)
NEUTS BAND # BLD MANUAL: 2 % (ref 0–10)
NEUTS SEG NFR BLD MANUAL: 84 % (ref 42–75)
NRBC BLD MANUAL-RTO: 1 /100WBC (ref 0–0)
PLATELET # BLD AUTO: 160 X10'3 (ref 140–440)
PLATELET BLD QL SMEAR: NORMAL
PMV BLD AUTO: 8.2 FL (ref 7.4–10.4)
POTASSIUM SERPL-SCNC: 3.4 MMOL/L (ref 3.5–5.1)
PROT SERPL-MCNC: 6.5 G/DL (ref 6.4–8.2)
RBC # BLD AUTO: 4.66 X10'6 (ref 4.2–5.6)
RBC MORPH BLD: NORMAL
SODIUM SERPL-SCNC: 142 MMOL/L (ref 135–145)
TOTAL CELLS COUNTED FLD: 100
VARIANT LYMPHS NFR BLD MANUAL: 2 % (ref 0–0)
WBC # BLD AUTO: 7.2 X10'3 (ref 4.5–11)

## 2021-10-10 PROCEDURE — 5A0935A ASSISTANCE WITH RESPIRATORY VENTILATION, LESS THAN 24 CONSECUTIVE HOURS, HIGH NASAL FLOW/VELOCITY: ICD-10-PCS | Performed by: INTERNAL MEDICINE

## 2021-10-10 RX ADMIN — ENOXAPARIN SODIUM SCH MG: 60 INJECTION SUBCUTANEOUS at 07:51

## 2021-10-10 RX ADMIN — DEXTROSE SCH MLS/HR: 50 INJECTION, SOLUTION INTRAVENOUS at 07:50

## 2021-10-10 RX ADMIN — GUAIFENESIN AND DEXTROMETHORPHAN PRN ML: 100; 10 SYRUP ORAL at 20:45

## 2021-10-10 RX ADMIN — HYDROMORPHONE HYDROCHLORIDE PRN MG: 1 INJECTION, SOLUTION INTRAMUSCULAR; INTRAVENOUS; SUBCUTANEOUS at 03:05

## 2021-10-10 RX ADMIN — DOCUSATE SODIUM SCH MG: 100 CAPSULE, LIQUID FILLED ORAL at 20:00

## 2021-10-10 RX ADMIN — DOCUSATE SODIUM SCH MG: 100 CAPSULE, LIQUID FILLED ORAL at 07:50

## 2021-10-10 RX ADMIN — GUAIFENESIN AND DEXTROMETHORPHAN PRN ML: 100; 10 SYRUP ORAL at 00:14

## 2021-10-10 RX ADMIN — PANTOPRAZOLE SODIUM SCH MG: 40 TABLET, DELAYED RELEASE ORAL at 07:50

## 2021-10-10 RX ADMIN — POTASSIUM CHLORIDE PRN MEQ: 1500 TABLET, EXTENDED RELEASE ORAL at 16:38

## 2021-10-10 RX ADMIN — Medication SCH MLS/HR: at 11:28

## 2021-10-10 RX ADMIN — HYDROMORPHONE HYDROCHLORIDE PRN MG: 1 INJECTION, SOLUTION INTRAMUSCULAR; INTRAVENOUS; SUBCUTANEOUS at 09:54

## 2021-10-10 RX ADMIN — GUAIFENESIN AND DEXTROMETHORPHAN PRN ML: 100; 10 SYRUP ORAL at 04:50

## 2021-10-10 RX ADMIN — Medication SCH MMU: at 07:50

## 2021-10-10 RX ADMIN — Medication SCH MMU: at 20:20

## 2021-10-10 RX ADMIN — GUAIFENESIN AND DEXTROMETHORPHAN PRN ML: 100; 10 SYRUP ORAL at 14:48

## 2021-10-10 RX ADMIN — ENOXAPARIN SODIUM SCH MG: 100 INJECTION SUBCUTANEOUS at 20:18

## 2021-10-10 RX ADMIN — POTASSIUM CHLORIDE PRN MEQ: 1500 TABLET, EXTENDED RELEASE ORAL at 20:43

## 2021-10-10 RX ADMIN — ENOXAPARIN SODIUM SCH MG: 100 INJECTION SUBCUTANEOUS at 20:19

## 2021-10-10 RX ADMIN — GUAIFENESIN AND DEXTROMETHORPHAN PRN ML: 100; 10 SYRUP ORAL at 08:56

## 2021-10-10 RX ADMIN — CEFTRIAXONE SCH MLS/HR: 2 INJECTION, SOLUTION INTRAVENOUS at 08:56

## 2021-10-10 NOTE — NUR
Problems reprioritized. Patient report given, questions answered & plan of care reviewed 
with ABISAI CONTRERAS.

## 2021-10-10 NOTE — NUR
Patient in room ORTHO 4012B. I have received report from  ABISAI DOWNS  and had the opportunity 
to ask questions and assume patient care.

## 2021-10-10 NOTE — NUR
Page Sent



PAGER ID:  [phone] 

MESSAGE:  BARBER [id number] RE: GHAZALA CACERES [id number] PT'S K+ WAS A LITTLE LOW TODAY, STARTED 
HER ON REPLACEMENT BUT ORDER HAS BEEN COMPLETED. CAN I REINSTATE REPLACEMENT ORDERS? THANKS!

## 2021-10-10 NOTE — NUR
Problems reprioritized. Patient report given, questions answered & plan of care reviewed 
with ABISAI Steele.

## 2021-10-11 VITALS — DIASTOLIC BLOOD PRESSURE: 87 MMHG | SYSTOLIC BLOOD PRESSURE: 120 MMHG

## 2021-10-11 VITALS — SYSTOLIC BLOOD PRESSURE: 158 MMHG | DIASTOLIC BLOOD PRESSURE: 94 MMHG

## 2021-10-11 VITALS — DIASTOLIC BLOOD PRESSURE: 68 MMHG | SYSTOLIC BLOOD PRESSURE: 126 MMHG

## 2021-10-11 VITALS — DIASTOLIC BLOOD PRESSURE: 66 MMHG | SYSTOLIC BLOOD PRESSURE: 133 MMHG

## 2021-10-11 VITALS — DIASTOLIC BLOOD PRESSURE: 71 MMHG | SYSTOLIC BLOOD PRESSURE: 106 MMHG

## 2021-10-11 VITALS — SYSTOLIC BLOOD PRESSURE: 144 MMHG | DIASTOLIC BLOOD PRESSURE: 71 MMHG

## 2021-10-11 LAB
ALBUMIN SERPL BCP-MCNC: 2.7 G/DL (ref 3.4–5)
ALBUMIN/GLOB SERPL: 0.7 {RATIO} (ref 1.1–1.5)
ALP SERPL-CCNC: 133 IU/L (ref 46–116)
ALT SERPL W P-5'-P-CCNC: 93 U/L (ref 12–78)
ANION GAP SERPL CALCULATED.3IONS-SCNC: 9 MMOL/L (ref 8–16)
ARTERIAL PATENCY WRIST A: POSITIVE
ARTERIAL PATENCY WRIST A: POSITIVE
AST SERPL W P-5'-P-CCNC: 66 U/L (ref 10–37)
BASE EXCESS BLDA CALC-SCNC: -0.9 MMOL/L (ref -2–2)
BASE EXCESS BLDA CALC-SCNC: 1.1 MMOL/L (ref -2–2)
BASOPHILS # BLD AUTO: 0 X10'3 (ref 0–0.2)
BASOPHILS NFR BLD AUTO: 0.1 % (ref 0–1)
BILIRUB SERPL-MCNC: 0.6 MG/DL (ref 0.1–1)
BODY TEMPERATURE: 37.4
BODY TEMPERATURE: 37.4
BUN SERPL-MCNC: 9 MG/DL (ref 7–18)
BUN/CREAT SERPL: 12 (ref 6.6–38)
CALCIUM SERPL-MCNC: 8.5 MG/DL (ref 8.5–10.1)
CHLORIDE SERPL-SCNC: 103 MMOL/L (ref 99–107)
CO2 SERPL-SCNC: 26.3 MMOL/L (ref 24–32)
COHGB MFR BLDA: 1 % (ref 0–3.9)
COHGB MFR BLDA: 1.2 % (ref 0–3.9)
CREAT SERPL-MCNC: 0.75 MG/DL (ref 0.4–0.9)
CRP SERPL HS-MCNC: 4.73 MG/DL (ref 0–0.5)
D DIMER PPP FEU-MCNC: 8.63 MG/L FEU (ref 0–0.5)
EOSINOPHIL # BLD AUTO: 0.1 X10'3 (ref 0–0.9)
EOSINOPHIL NFR BLD AUTO: 1.3 % (ref 0–6)
ERYTHROCYTE [DISTWIDTH] IN BLOOD BY AUTOMATED COUNT: 13.7 % (ref 11.5–14.5)
GFR SERPL CREATININE-BSD FRML MDRD: 80 ML/MIN
GLUCOSE SERPL-MCNC: 119 MG/DL (ref 70–104)
HCO3 BLDA-SCNC: 20.6 MMOL/L (ref 22–26)
HCO3 BLDA-SCNC: 23.8 MMOL/L (ref 22–26)
HCT VFR BLD AUTO: 40.4 % (ref 35–45)
HGB BLD-MCNC: 13.7 G/DL (ref 12–16)
HGB BLDA-MCNC: 14.1 G/DL (ref 12–16)
HGB BLDA-MCNC: 14.8 G/DL (ref 12–16)
INHALED O2 FLOW RATE: 40 L/MIN
LDH SERPL-CCNC: 1368 U/L (ref 81–234)
LYMPHOCYTES # BLD AUTO: 0.5 X10'3 (ref 1.1–4.8)
LYMPHOCYTES NFR BLD AUTO: 6.3 % (ref 21–51)
MAGNESIUM SERPL-MCNC: 1.9 MG/DL (ref 1.5–2.4)
MCH RBC QN AUTO: 30 PG (ref 27–31)
MCHC RBC AUTO-ENTMCNC: 33.8 G/DL (ref 33–36.5)
MCV RBC AUTO: 89 FL (ref 78–98)
METHGB MFR BLDA: 0 % (ref 0–1.5)
METHGB MFR BLDA: 0.3 % (ref 0–1.5)
MONOCYTES # BLD AUTO: 0.2 X10'3 (ref 0–0.9)
MONOCYTES NFR BLD AUTO: 3 % (ref 2–12)
NEUTROPHILS # BLD AUTO: 7.3 X10'3 (ref 1.8–7.7)
NEUTROPHILS NFR BLD AUTO: 89.3 % (ref 42–75)
O2/TOTAL GAS SETTING VFR VENT: 100 MMHG/%
O2/TOTAL GAS SETTING VFR VENT: 100 MMHG/%
OXYHGB MFR BLDA: 79.9 % (ref 94–97)
OXYHGB MFR BLDA: 95.8 % (ref 94–97)
PCO2 TEMP ADJ BLDA: 27 MMHG (ref 32–45)
PCO2 TEMP ADJ BLDA: 32.9 MMHG (ref 32–45)
PLATELET # BLD AUTO: 152 X10'3 (ref 140–440)
PMV BLD AUTO: 8.8 FL (ref 7.4–10.4)
PO2 TEMP ADJ BLD: 41.7 MMHG (ref 75–100)
PO2 TEMP ADJ BLD: 93.1 MMHG (ref 75–100)
POTASSIUM SERPL-SCNC: 3.2 MMOL/L (ref 3.5–5.1)
PROT SERPL-MCNC: 6.7 G/DL (ref 6.4–8.2)
RBC # BLD AUTO: 4.55 X10'6 (ref 4.2–5.6)
SAO2 % BLDA: 80.9 % (ref 94–97)
SAO2 % BLDA: 97.1 % (ref 94–97)
SODIUM SERPL-SCNC: 138 MMOL/L (ref 135–145)
WBC # BLD AUTO: 8.2 X10'3 (ref 4.5–11)

## 2021-10-11 PROCEDURE — 5A0935A ASSISTANCE WITH RESPIRATORY VENTILATION, LESS THAN 24 CONSECUTIVE HOURS, HIGH NASAL FLOW/VELOCITY: ICD-10-PCS | Performed by: INTERNAL MEDICINE

## 2021-10-11 PROCEDURE — 5A09457 ASSISTANCE WITH RESPIRATORY VENTILATION, 24-96 CONSECUTIVE HOURS, CONTINUOUS POSITIVE AIRWAY PRESSURE: ICD-10-PCS | Performed by: INTERNAL MEDICINE

## 2021-10-11 RX ADMIN — ENOXAPARIN SODIUM SCH MG: 100 INJECTION SUBCUTANEOUS at 08:31

## 2021-10-11 RX ADMIN — DOCUSATE SODIUM SCH MG: 100 CAPSULE, LIQUID FILLED ORAL at 20:00

## 2021-10-11 RX ADMIN — ENOXAPARIN SODIUM SCH MG: 100 INJECTION SUBCUTANEOUS at 20:30

## 2021-10-11 RX ADMIN — CEFTRIAXONE SCH MLS/HR: 2 INJECTION, SOLUTION INTRAVENOUS at 09:15

## 2021-10-11 RX ADMIN — ENOXAPARIN SODIUM SCH MG: 100 INJECTION SUBCUTANEOUS at 08:30

## 2021-10-11 RX ADMIN — PANTOPRAZOLE SODIUM SCH MG: 40 TABLET, DELAYED RELEASE ORAL at 08:30

## 2021-10-11 RX ADMIN — HYDROCODONE BITARTRATE AND ACETAMINOPHEN PRN ML: 7.5; 325 SOLUTION ORAL at 01:57

## 2021-10-11 RX ADMIN — Medication SCH MMU: at 08:30

## 2021-10-11 RX ADMIN — DOCUSATE SODIUM SCH MG: 100 CAPSULE, LIQUID FILLED ORAL at 08:30

## 2021-10-11 RX ADMIN — Medication SCH MMU: at 20:29

## 2021-10-11 RX ADMIN — Medication SCH MLS/HR: at 08:00

## 2021-10-11 RX ADMIN — GUAIFENESIN AND DEXTROMETHORPHAN PRN ML: 100; 10 SYRUP ORAL at 08:30

## 2021-10-11 RX ADMIN — DEXTROSE SCH MLS/HR: 50 INJECTION, SOLUTION INTRAVENOUS at 08:29

## 2021-10-11 NOTE — NUR
Patient in room ORTHO 4012B. I have received report from  ABISAI CONTRERAS and had the 
opportunity to ask questions and assume patient care.

## 2021-10-11 NOTE — NUR
Page Sent



PAGER ID:  [phone] 

MESSAGE:  BARBER [id number] RE: GHAZALA CACERES [id number] CALLED RAPID ON PT, HR IN 140S, DESATTING 
TO 60S-70S. RT IS IN ROOM NOW, PUTTING PT ON BIPAP. THANKS I certify as stated above.

## 2021-10-11 NOTE — NUR
Page Sent



PAGER ID:  [phone] 

MESSAGE:  BARBER [id number] RE: GHAZALA CACERES [id number] PT IS REQUESTING TO BE PLACED ON VITAMIN 
C, D3, AND ZINC. THANK YOU!

## 2021-10-11 NOTE — NUR
Initial: Pt admit DX COVID-19 PNA, possible bacterial PNA, and acute 

respiratory failure w/ hypoxemia per EMR. PO 0% vs refusing most of 

regular diet past 4 days since admit not meeting needs. Noted pt on 60L 

HFNC w/ 100% FiO2 saturations in 90% per EMR; previously saturation in 

80's per MD note. Respiratory status likely impacting PO ability though is 

able to take meds. LBM 10/10 receiving routine colace. IF poor PO intake 

persists may benefit from alternative nutrition to meet needs given 

respiratory status; tube feeds recs below. Will continue to monitor.

Rec:

1. continue regular diet; encourage PO

2. IF poor PO persists; consider NG feeds to optimize nutrition status

3. IF TF; Vital AF at 65ml/hr goal; would provide 1560ml volume/day, 1872kcals, 1264ml 
water, and 117g protein. Adjust EN recs once scaled wt this admit

4. IF TF; additional water flush 100ml Q4H

5. IF TF; PALB Q M/Th; daily wts

6. routine bowel care

7. scaled wt this admit; subsequent weekly wts

-------------------------------------------------------------------------------

Addendum: 10/11/21 at 1348 by Billy Andujar RD

-------------------------------------------------------------------------------

Amended: Links added.

## 2021-10-11 NOTE — NUR
Page Sent

 

PAGER ID:  [phone] 

MESSAGE:  BARBER [id number] RE: GHAZALA CACERES [id number] PT'S CHEST X-RAY REPORT IS ALSO 
AVAILABLE. REPORT DOES NOT SAY PE WAS FOUND. DO YOU STILL WANT TO START HER ON HEPARIN DRIP? 
THANKS!

## 2021-10-12 VITALS — DIASTOLIC BLOOD PRESSURE: 101 MMHG | SYSTOLIC BLOOD PRESSURE: 120 MMHG

## 2021-10-12 VITALS — DIASTOLIC BLOOD PRESSURE: 58 MMHG | SYSTOLIC BLOOD PRESSURE: 126 MMHG

## 2021-10-12 VITALS — DIASTOLIC BLOOD PRESSURE: 58 MMHG | SYSTOLIC BLOOD PRESSURE: 99 MMHG

## 2021-10-12 VITALS — SYSTOLIC BLOOD PRESSURE: 126 MMHG | DIASTOLIC BLOOD PRESSURE: 72 MMHG

## 2021-10-12 VITALS — SYSTOLIC BLOOD PRESSURE: 151 MMHG | DIASTOLIC BLOOD PRESSURE: 80 MMHG

## 2021-10-12 VITALS — DIASTOLIC BLOOD PRESSURE: 81 MMHG | SYSTOLIC BLOOD PRESSURE: 124 MMHG

## 2021-10-12 LAB
ALBUMIN SERPL BCP-MCNC: 2.6 G/DL (ref 3.4–5)
ALBUMIN/GLOB SERPL: 0.6 {RATIO} (ref 1.1–1.5)
ALP SERPL-CCNC: 119 IU/L (ref 46–116)
ALT SERPL W P-5'-P-CCNC: 77 U/L (ref 12–78)
ANION GAP SERPL CALCULATED.3IONS-SCNC: 14 MMOL/L (ref 8–16)
AST SERPL W P-5'-P-CCNC: 53 U/L (ref 10–37)
BASOPHILS # BLD AUTO: 0 X10'3 (ref 0–0.2)
BASOPHILS NFR BLD AUTO: 0.1 % (ref 0–1)
BILIRUB SERPL-MCNC: 0.6 MG/DL (ref 0.1–1)
BUN SERPL-MCNC: 9 MG/DL (ref 7–18)
BUN/CREAT SERPL: 13.4 (ref 6.6–38)
CALCIUM SERPL-MCNC: 8.6 MG/DL (ref 8.5–10.1)
CHLORIDE SERPL-SCNC: 104 MMOL/L (ref 99–107)
CO2 SERPL-SCNC: 23.5 MMOL/L (ref 24–32)
CREAT SERPL-MCNC: 0.67 MG/DL (ref 0.4–0.9)
CRP SERPL HS-MCNC: 9.97 MG/DL (ref 0–0.5)
D DIMER PPP FEU-MCNC: 7.09 MG/L FEU (ref 0–0.5)
EOSINOPHIL # BLD AUTO: 0.1 X10'3 (ref 0–0.9)
EOSINOPHIL NFR BLD AUTO: 0.8 % (ref 0–6)
ERYTHROCYTE [DISTWIDTH] IN BLOOD BY AUTOMATED COUNT: 14 % (ref 11.5–14.5)
GFR SERPL CREATININE-BSD FRML MDRD: > 90 ML/MIN
GLUCOSE SERPL-MCNC: 110 MG/DL (ref 70–104)
HCT VFR BLD AUTO: 40.7 % (ref 35–45)
HGB BLD-MCNC: 13.6 G/DL (ref 12–16)
LDH SERPL-CCNC: 1200 U/L (ref 81–234)
LYMPHOCYTES # BLD AUTO: 0.3 X10'3 (ref 1.1–4.8)
LYMPHOCYTES NFR BLD AUTO: 3.4 % (ref 21–51)
MAGNESIUM SERPL-MCNC: 2 MG/DL (ref 1.5–2.4)
MCH RBC QN AUTO: 30 PG (ref 27–31)
MCHC RBC AUTO-ENTMCNC: 33.4 G/DL (ref 33–36.5)
MCV RBC AUTO: 89.7 FL (ref 78–98)
MONOCYTES # BLD AUTO: 0.3 X10'3 (ref 0–0.9)
MONOCYTES NFR BLD AUTO: 3.6 % (ref 2–12)
NEUTROPHILS # BLD AUTO: 8.5 X10'3 (ref 1.8–7.7)
NEUTROPHILS NFR BLD AUTO: 92.1 % (ref 42–75)
PLATELET # BLD AUTO: 166 X10'3 (ref 140–440)
PMV BLD AUTO: 9 FL (ref 7.4–10.4)
POTASSIUM SERPL-SCNC: 3.2 MMOL/L (ref 3.5–5.1)
PROT SERPL-MCNC: 6.9 G/DL (ref 6.4–8.2)
RBC # BLD AUTO: 4.53 X10'6 (ref 4.2–5.6)
SODIUM SERPL-SCNC: 141 MMOL/L (ref 135–145)
WBC # BLD AUTO: 9.2 X10'3 (ref 4.5–11)

## 2021-10-12 PROCEDURE — XW033H5 INTRODUCTION OF TOCILIZUMAB INTO PERIPHERAL VEIN, PERCUTANEOUS APPROACH, NEW TECHNOLOGY GROUP 5: ICD-10-PCS | Performed by: INTERNAL MEDICINE

## 2021-10-12 RX ADMIN — DOCUSATE SODIUM SCH MG: 100 CAPSULE, LIQUID FILLED ORAL at 20:00

## 2021-10-12 RX ADMIN — GUAIFENESIN AND DEXTROMETHORPHAN PRN ML: 100; 10 SYRUP ORAL at 21:07

## 2021-10-12 RX ADMIN — PANTOPRAZOLE SODIUM SCH MG: 40 TABLET, DELAYED RELEASE ORAL at 08:01

## 2021-10-12 RX ADMIN — ENOXAPARIN SODIUM SCH MG: 100 INJECTION SUBCUTANEOUS at 21:03

## 2021-10-12 RX ADMIN — Medication SCH MMU: at 08:01

## 2021-10-12 RX ADMIN — GUAIFENESIN AND DEXTROMETHORPHAN PRN ML: 100; 10 SYRUP ORAL at 12:39

## 2021-10-12 RX ADMIN — DEXTROSE SCH MLS/HR: 50 INJECTION, SOLUTION INTRAVENOUS at 08:00

## 2021-10-12 RX ADMIN — Medication SCH MLS/HR: at 10:37

## 2021-10-12 RX ADMIN — DOCUSATE SODIUM SCH MG: 100 CAPSULE, LIQUID FILLED ORAL at 08:00

## 2021-10-12 RX ADMIN — CEFTRIAXONE SCH MLS/HR: 2 INJECTION, SOLUTION INTRAVENOUS at 09:26

## 2021-10-12 RX ADMIN — POTASSIUM CHLORIDE PRN MEQ: 1500 TABLET, EXTENDED RELEASE ORAL at 16:19

## 2021-10-12 RX ADMIN — ENOXAPARIN SODIUM SCH MG: 100 INJECTION SUBCUTANEOUS at 08:02

## 2021-10-12 RX ADMIN — POTASSIUM CHLORIDE PRN MEQ: 1500 TABLET, EXTENDED RELEASE ORAL at 10:38

## 2021-10-12 RX ADMIN — GUAIFENESIN AND DEXTROMETHORPHAN PRN ML: 100; 10 SYRUP ORAL at 08:03

## 2021-10-12 RX ADMIN — ENOXAPARIN SODIUM SCH MG: 100 INJECTION SUBCUTANEOUS at 08:01

## 2021-10-12 RX ADMIN — Medication SCH MMU: at 21:03

## 2021-10-12 RX ADMIN — METHYLPREDNISOLONE SODIUM SUCCINATE SCH MG: 40 INJECTION, POWDER, FOR SOLUTION INTRAMUSCULAR; INTRAVENOUS at 17:21

## 2021-10-12 NOTE — NUR
Page Sent

 

PAGER ID:  [phone] 

MESSAGE:  BARBER [phone] RE: GHAZALA CACERES [id number] CAN I GET AN ORDER FOR ATIVAN? PT IS 
PANICKING AND TRYING TO TAKE OFF BIPAP, DESATTING. THANK YOU!

## 2021-10-12 NOTE — NUR
Patient in room ORTHO 4012. I have received report from ABISAI tSeele and had the opportunity 
to ask questions and assume patient care. Patient is sitting on side of bed with Bipap on, 
it looks like she attempted to eat dinner, but only took a few bites of her dessert. I will 
continue to monitor.

## 2021-10-12 NOTE — NUR
Problems reprioritized. Patient report given, questions answered & plan of care reviewed 
with ABISAI MARLEY.

## 2021-10-12 NOTE — NUR
Patient had personal med/inhaler on bedside table and when asked if she had been using it 
she said yes. I advised her she could not use home meds and if she had any worsening SOB to 
let me know and I would call RT. I took inhaler and placed in personal med bin.

## 2021-10-12 NOTE — NUR
Pt took off mask and tried to re-connect the parts. O2 desat to the 50s. Pt started 
convulsing. We sat her up in bed and put mask on and her O2 came up. Respiratory came up to 
assess and re-hook up correctly.

## 2021-10-12 NOTE — NUR
Page Sent



PAGER ID:  [phone] 

MESSAGE:  BARBER [phone] RE: GHAZALA CACERES [id number] CAN I GET AN ORDER FOR MORPHINE AS WELL 
TO HELP WITH BREATHING? HER PANIC ATTACK IS OVER AND SHE IS DOING MUCH BETTER. THANK YOU!

## 2021-10-13 VITALS — DIASTOLIC BLOOD PRESSURE: 67 MMHG | SYSTOLIC BLOOD PRESSURE: 116 MMHG

## 2021-10-13 VITALS — SYSTOLIC BLOOD PRESSURE: 124 MMHG | DIASTOLIC BLOOD PRESSURE: 100 MMHG

## 2021-10-13 VITALS — DIASTOLIC BLOOD PRESSURE: 51 MMHG | SYSTOLIC BLOOD PRESSURE: 96 MMHG

## 2021-10-13 VITALS — DIASTOLIC BLOOD PRESSURE: 53 MMHG | SYSTOLIC BLOOD PRESSURE: 103 MMHG

## 2021-10-13 VITALS — SYSTOLIC BLOOD PRESSURE: 116 MMHG | DIASTOLIC BLOOD PRESSURE: 78 MMHG

## 2021-10-13 LAB
CRP SERPL HS-MCNC: 17.21 MG/DL (ref 0–0.5)
D DIMER PPP FEU-MCNC: 3.36 MG/L FEU (ref 0–0.5)
LDH SERPL-CCNC: 1223 U/L (ref 81–234)
MAGNESIUM SERPL-MCNC: 2.5 MG/DL (ref 1.5–2.4)
POTASSIUM SERPL-SCNC: 3.9 MMOL/L (ref 3.5–5.1)

## 2021-10-13 RX ADMIN — DOCUSATE SODIUM SCH MG: 100 CAPSULE, LIQUID FILLED ORAL at 07:20

## 2021-10-13 RX ADMIN — Medication SCH MMU: at 07:20

## 2021-10-13 RX ADMIN — METHYLPREDNISOLONE SODIUM SUCCINATE SCH MG: 40 INJECTION, POWDER, FOR SOLUTION INTRAMUSCULAR; INTRAVENOUS at 00:21

## 2021-10-13 RX ADMIN — PANTOPRAZOLE SODIUM SCH MG: 40 TABLET, DELAYED RELEASE ORAL at 07:29

## 2021-10-13 RX ADMIN — METHYLPREDNISOLONE SODIUM SUCCINATE SCH MG: 40 INJECTION, POWDER, FOR SOLUTION INTRAMUSCULAR; INTRAVENOUS at 16:26

## 2021-10-13 RX ADMIN — ENOXAPARIN SODIUM SCH MG: 100 INJECTION SUBCUTANEOUS at 19:41

## 2021-10-13 RX ADMIN — METHYLPREDNISOLONE SODIUM SUCCINATE SCH MG: 40 INJECTION, POWDER, FOR SOLUTION INTRAMUSCULAR; INTRAVENOUS at 07:29

## 2021-10-13 RX ADMIN — GUAIFENESIN AND DEXTROMETHORPHAN PRN ML: 100; 10 SYRUP ORAL at 23:22

## 2021-10-13 RX ADMIN — CEFTRIAXONE SCH MLS/HR: 2 INJECTION, SOLUTION INTRAVENOUS at 07:20

## 2021-10-13 RX ADMIN — DEXTROSE SCH MLS/HR: 50 INJECTION, SOLUTION INTRAVENOUS at 07:21

## 2021-10-13 RX ADMIN — DOCUSATE SODIUM SCH MG: 100 CAPSULE, LIQUID FILLED ORAL at 19:43

## 2021-10-13 RX ADMIN — ENOXAPARIN SODIUM SCH MG: 100 INJECTION SUBCUTANEOUS at 07:18

## 2021-10-13 RX ADMIN — GUAIFENESIN AND DEXTROMETHORPHAN PRN ML: 100; 10 SYRUP ORAL at 16:27

## 2021-10-13 RX ADMIN — ENOXAPARIN SODIUM SCH MG: 100 INJECTION SUBCUTANEOUS at 07:19

## 2021-10-13 RX ADMIN — METHYLPREDNISOLONE SODIUM SUCCINATE SCH MG: 40 INJECTION, POWDER, FOR SOLUTION INTRAMUSCULAR; INTRAVENOUS at 23:22

## 2021-10-13 RX ADMIN — Medication SCH MMU: at 19:40

## 2021-10-13 NOTE — NUR
Patient in room ORTHO 4012. I have received report from  Cecilio BARONE  and had the 
opportunity to ask questions and assume patient care.

## 2021-10-13 NOTE — NUR
Problems reprioritized. Patient report given, questions answered & plan of care reviewed 
with ABISAI Vidal.

## 2021-10-14 VITALS — DIASTOLIC BLOOD PRESSURE: 83 MMHG | SYSTOLIC BLOOD PRESSURE: 133 MMHG

## 2021-10-14 VITALS — DIASTOLIC BLOOD PRESSURE: 87 MMHG | SYSTOLIC BLOOD PRESSURE: 137 MMHG

## 2021-10-14 VITALS — DIASTOLIC BLOOD PRESSURE: 65 MMHG | SYSTOLIC BLOOD PRESSURE: 90 MMHG

## 2021-10-14 VITALS — SYSTOLIC BLOOD PRESSURE: 113 MMHG | DIASTOLIC BLOOD PRESSURE: 70 MMHG

## 2021-10-14 VITALS — SYSTOLIC BLOOD PRESSURE: 130 MMHG | DIASTOLIC BLOOD PRESSURE: 69 MMHG

## 2021-10-14 LAB
CRP SERPL HS-MCNC: 5.85 MG/DL (ref 0–0.5)
D DIMER PPP FEU-MCNC: 3.6 MG/L FEU (ref 0–0.5)
LDH SERPL-CCNC: 1243 U/L (ref 81–234)
MAGNESIUM SERPL-MCNC: 2.6 MG/DL (ref 1.5–2.4)
POTASSIUM SERPL-SCNC: 4.1 MMOL/L (ref 3.5–5.1)

## 2021-10-14 PROCEDURE — 5A0935A ASSISTANCE WITH RESPIRATORY VENTILATION, LESS THAN 24 CONSECUTIVE HOURS, HIGH NASAL FLOW/VELOCITY: ICD-10-PCS | Performed by: INTERNAL MEDICINE

## 2021-10-14 RX ADMIN — GUAIFENESIN AND DEXTROMETHORPHAN PRN ML: 100; 10 SYRUP ORAL at 16:35

## 2021-10-14 RX ADMIN — GUAIFENESIN AND DEXTROMETHORPHAN PRN ML: 100; 10 SYRUP ORAL at 07:39

## 2021-10-14 RX ADMIN — METHYLPREDNISOLONE SODIUM SUCCINATE SCH MG: 40 INJECTION, POWDER, FOR SOLUTION INTRAMUSCULAR; INTRAVENOUS at 07:40

## 2021-10-14 RX ADMIN — DOCUSATE SODIUM SCH MG: 100 CAPSULE, LIQUID FILLED ORAL at 07:41

## 2021-10-14 RX ADMIN — GUAIFENESIN AND DEXTROMETHORPHAN PRN ML: 100; 10 SYRUP ORAL at 11:56

## 2021-10-14 RX ADMIN — DOCUSATE SODIUM SCH MG: 100 CAPSULE, LIQUID FILLED ORAL at 19:23

## 2021-10-14 RX ADMIN — Medication SCH CAN: at 18:00

## 2021-10-14 RX ADMIN — Medication SCH MMU: at 07:39

## 2021-10-14 RX ADMIN — ENOXAPARIN SODIUM SCH MG: 100 INJECTION SUBCUTANEOUS at 07:39

## 2021-10-14 RX ADMIN — ENOXAPARIN SODIUM SCH MG: 100 INJECTION SUBCUTANEOUS at 19:22

## 2021-10-14 RX ADMIN — Medication SCH MMU: at 19:22

## 2021-10-14 RX ADMIN — GUAIFENESIN AND DEXTROMETHORPHAN PRN ML: 100; 10 SYRUP ORAL at 21:48

## 2021-10-14 RX ADMIN — Medication SCH CAN: at 13:00

## 2021-10-14 RX ADMIN — ENOXAPARIN SODIUM SCH MG: 100 INJECTION SUBCUTANEOUS at 07:40

## 2021-10-14 RX ADMIN — METHYLPREDNISOLONE SODIUM SUCCINATE SCH MG: 40 INJECTION, POWDER, FOR SOLUTION INTRAMUSCULAR; INTRAVENOUS at 16:35

## 2021-10-14 RX ADMIN — PANTOPRAZOLE SODIUM SCH MG: 40 TABLET, DELAYED RELEASE ORAL at 07:40

## 2021-10-14 RX ADMIN — METHYLPREDNISOLONE SODIUM SUCCINATE SCH MG: 40 INJECTION, POWDER, FOR SOLUTION INTRAMUSCULAR; INTRAVENOUS at 23:47

## 2021-10-14 NOTE — NUR
Problems reprioritized. Patient report given, questions answered & plan of care reviewed 
with Cecilio BARONE.

## 2021-10-14 NOTE — NUR
Reassessment: Pt PO steadily improving more consistent PO ~25-50% avg

regular diet past two days partially meeting needs. Noted pt currently on

continuous bipap 100% FiO2 per EMR though feeling better yesterday per MD

note. Given increased PO acceptance pt would benefit from Ensure Enlive

TIDWM; MD notified. LBM 10/12 refusing routine colace per EMR. Will

continue to monitor for additional nutrition intervention needs.

Rec:

1. continue regular diet; encourage PO

2. Ensure Enlive TIDWM; pending MD verification in EMR

3. IF PO regresses given bipap dependence; consider NG feeds to optimize 

nutrition status. IF TF; Vital AF at 65ml/hr goal; would provide 1560ml 

volume/day, 1872kcals, 1264ml water, and 117g protein. Adjust EN recs once 

scaled wt this admit

4. routine bowel care

5. scaled wt this admit; subsequent weekly wts

-------------------------------------------------------------------------------

Addendum: 10/14/21 at 0917 by Billy Andujar RD

-------------------------------------------------------------------------------

Amended: Links added.

## 2021-10-15 VITALS — SYSTOLIC BLOOD PRESSURE: 133 MMHG | DIASTOLIC BLOOD PRESSURE: 87 MMHG

## 2021-10-15 VITALS — SYSTOLIC BLOOD PRESSURE: 141 MMHG | DIASTOLIC BLOOD PRESSURE: 105 MMHG

## 2021-10-15 VITALS — SYSTOLIC BLOOD PRESSURE: 118 MMHG | DIASTOLIC BLOOD PRESSURE: 78 MMHG

## 2021-10-15 VITALS — DIASTOLIC BLOOD PRESSURE: 100 MMHG | SYSTOLIC BLOOD PRESSURE: 110 MMHG

## 2021-10-15 VITALS — DIASTOLIC BLOOD PRESSURE: 79 MMHG | SYSTOLIC BLOOD PRESSURE: 108 MMHG

## 2021-10-15 LAB
CRP SERPL HS-MCNC: 2.14 MG/DL (ref 0–0.5)
D DIMER PPP FEU-MCNC: 4.82 MG/L FEU (ref 0–0.5)
LDH SERPL-CCNC: 1079 U/L (ref 81–234)
POTASSIUM SERPL-SCNC: 4.1 MMOL/L (ref 3.5–5.1)

## 2021-10-15 PROCEDURE — 5A0935A ASSISTANCE WITH RESPIRATORY VENTILATION, LESS THAN 24 CONSECUTIVE HOURS, HIGH NASAL FLOW/VELOCITY: ICD-10-PCS | Performed by: INTERNAL MEDICINE

## 2021-10-15 PROCEDURE — 5A09357 ASSISTANCE WITH RESPIRATORY VENTILATION, LESS THAN 24 CONSECUTIVE HOURS, CONTINUOUS POSITIVE AIRWAY PRESSURE: ICD-10-PCS | Performed by: INTERNAL MEDICINE

## 2021-10-15 RX ADMIN — ENOXAPARIN SODIUM SCH MG: 100 INJECTION SUBCUTANEOUS at 07:41

## 2021-10-15 RX ADMIN — ENOXAPARIN SODIUM SCH MG: 100 INJECTION SUBCUTANEOUS at 19:29

## 2021-10-15 RX ADMIN — Medication SCH CAN: at 13:00

## 2021-10-15 RX ADMIN — Medication SCH CAN: at 08:00

## 2021-10-15 RX ADMIN — ENOXAPARIN SODIUM SCH MG: 100 INJECTION SUBCUTANEOUS at 07:42

## 2021-10-15 RX ADMIN — DOCUSATE SODIUM SCH MG: 100 CAPSULE, LIQUID FILLED ORAL at 09:00

## 2021-10-15 RX ADMIN — GUAIFENESIN AND DEXTROMETHORPHAN PRN ML: 100; 10 SYRUP ORAL at 07:47

## 2021-10-15 RX ADMIN — METHYLPREDNISOLONE SODIUM SUCCINATE SCH MG: 40 INJECTION, POWDER, FOR SOLUTION INTRAMUSCULAR; INTRAVENOUS at 07:41

## 2021-10-15 RX ADMIN — DOCUSATE SODIUM SCH MG: 100 CAPSULE, LIQUID FILLED ORAL at 19:30

## 2021-10-15 RX ADMIN — PANTOPRAZOLE SODIUM SCH MG: 40 TABLET, DELAYED RELEASE ORAL at 09:00

## 2021-10-15 RX ADMIN — ENOXAPARIN SODIUM SCH MG: 100 INJECTION SUBCUTANEOUS at 19:30

## 2021-10-15 RX ADMIN — Medication SCH CAN: at 18:00

## 2021-10-15 RX ADMIN — GUAIFENESIN AND DEXTROMETHORPHAN PRN ML: 100; 10 SYRUP ORAL at 21:27

## 2021-10-15 RX ADMIN — Medication SCH MMU: at 07:41

## 2021-10-15 RX ADMIN — Medication SCH MMU: at 19:29

## 2021-10-15 RX ADMIN — METHYLPREDNISOLONE SODIUM SUCCINATE SCH MG: 40 INJECTION, POWDER, FOR SOLUTION INTRAMUSCULAR; INTRAVENOUS at 17:31

## 2021-10-15 NOTE — NUR
Patient has been attempting to use the high flow while eating. Switched patient from bipap 
to high flow tower and patient's oxygen saturation dropped to 50's. Bipap replaced and 
patient came back up to low 90's.

## 2021-10-16 VITALS — DIASTOLIC BLOOD PRESSURE: 84 MMHG | SYSTOLIC BLOOD PRESSURE: 116 MMHG

## 2021-10-16 VITALS — SYSTOLIC BLOOD PRESSURE: 129 MMHG | DIASTOLIC BLOOD PRESSURE: 97 MMHG

## 2021-10-16 VITALS — DIASTOLIC BLOOD PRESSURE: 83 MMHG | SYSTOLIC BLOOD PRESSURE: 116 MMHG

## 2021-10-16 VITALS — SYSTOLIC BLOOD PRESSURE: 112 MMHG | DIASTOLIC BLOOD PRESSURE: 87 MMHG

## 2021-10-16 VITALS — DIASTOLIC BLOOD PRESSURE: 75 MMHG | SYSTOLIC BLOOD PRESSURE: 118 MMHG

## 2021-10-16 LAB
ALBUMIN SERPL BCP-MCNC: 2.7 G/DL (ref 3.4–5)
ALBUMIN/GLOB SERPL: 0.6 {RATIO} (ref 1.1–1.5)
ALP SERPL-CCNC: 91 IU/L (ref 46–116)
ALT SERPL W P-5'-P-CCNC: 41 U/L (ref 12–78)
ANION GAP SERPL CALCULATED.3IONS-SCNC: 11 MMOL/L (ref 8–16)
AST SERPL W P-5'-P-CCNC: 36 U/L (ref 10–37)
BASOPHILS # BLD AUTO: 0 X10'3 (ref 0–0.2)
BASOPHILS NFR BLD AUTO: 0.1 % (ref 0–1)
BILIRUB SERPL-MCNC: 0.3 MG/DL (ref 0.1–1)
BUN SERPL-MCNC: 13 MG/DL (ref 7–18)
BUN/CREAT SERPL: 17.3 (ref 6.6–38)
CALCIUM SERPL-MCNC: 8.4 MG/DL (ref 8.5–10.1)
CHLORIDE SERPL-SCNC: 104 MMOL/L (ref 99–107)
CO2 SERPL-SCNC: 26.2 MMOL/L (ref 24–32)
CREAT SERPL-MCNC: 0.75 MG/DL (ref 0.4–0.9)
CRP SERPL HS-MCNC: 0.89 MG/DL (ref 0–0.5)
D DIMER PPP FEU-MCNC: 7.07 MG/L FEU (ref 0–0.5)
EOSINOPHIL # BLD AUTO: 0 X10'3 (ref 0–0.9)
EOSINOPHIL NFR BLD AUTO: 0.2 % (ref 0–6)
ERYTHROCYTE [DISTWIDTH] IN BLOOD BY AUTOMATED COUNT: 14 % (ref 11.5–14.5)
GFR SERPL CREATININE-BSD FRML MDRD: 80 ML/MIN
GLUCOSE SERPL-MCNC: 112 MG/DL (ref 70–104)
HCT VFR BLD AUTO: 44.1 % (ref 35–45)
HGB BLD-MCNC: 14.5 G/DL (ref 12–16)
LDH SERPL-CCNC: 1080 U/L (ref 81–234)
LYMPHOCYTES # BLD AUTO: 0.3 X10'3 (ref 1.1–4.8)
LYMPHOCYTES NFR BLD AUTO: 4 % (ref 21–51)
LYMPHOCYTES NFR BLD MANUAL: 7 % (ref 21–51)
MCH RBC QN AUTO: 29.6 PG (ref 27–31)
MCHC RBC AUTO-ENTMCNC: 32.9 G/DL (ref 33–36.5)
MCV RBC AUTO: 89.9 FL (ref 78–98)
MONOCYTES # BLD AUTO: 0.3 X10'3 (ref 0–0.9)
MONOCYTES NFR BLD AUTO: 4.4 % (ref 2–12)
MONOCYTES NFR BLD MANUAL: 1 % (ref 2–12)
NEUTROPHILS # BLD AUTO: 7.1 X10'3 (ref 1.8–7.7)
NEUTROPHILS NFR BLD AUTO: 91.3 % (ref 42–75)
NEUTS BAND # BLD MANUAL: 5 % (ref 0–10)
NEUTS SEG NFR BLD MANUAL: 87 % (ref 42–75)
PLATELET # BLD AUTO: 272 X10'3 (ref 140–440)
PLATELET BLD QL SMEAR: NORMAL
PMV BLD AUTO: 10 FL (ref 7.4–10.4)
POTASSIUM SERPL-SCNC: 4.3 MMOL/L (ref 3.5–5.1)
PROT SERPL-MCNC: 7.3 G/DL (ref 6.4–8.2)
RBC # BLD AUTO: 4.91 X10'6 (ref 4.2–5.6)
RBC MORPH BLD: NORMAL
SODIUM SERPL-SCNC: 141 MMOL/L (ref 135–145)
TOTAL CELLS COUNTED FLD: 100
WBC # BLD AUTO: 7.8 X10'3 (ref 4.5–11)

## 2021-10-16 PROCEDURE — 5A0935A ASSISTANCE WITH RESPIRATORY VENTILATION, LESS THAN 24 CONSECUTIVE HOURS, HIGH NASAL FLOW/VELOCITY: ICD-10-PCS | Performed by: INTERNAL MEDICINE

## 2021-10-16 PROCEDURE — 5A09457 ASSISTANCE WITH RESPIRATORY VENTILATION, 24-96 CONSECUTIVE HOURS, CONTINUOUS POSITIVE AIRWAY PRESSURE: ICD-10-PCS | Performed by: INTERNAL MEDICINE

## 2021-10-16 RX ADMIN — Medication SCH CAN: at 14:41

## 2021-10-16 RX ADMIN — METHYLPREDNISOLONE SODIUM SUCCINATE SCH MG: 40 INJECTION, POWDER, FOR SOLUTION INTRAMUSCULAR; INTRAVENOUS at 08:42

## 2021-10-16 RX ADMIN — METHYLPREDNISOLONE SODIUM SUCCINATE SCH MG: 40 INJECTION, POWDER, FOR SOLUTION INTRAMUSCULAR; INTRAVENOUS at 16:00

## 2021-10-16 RX ADMIN — METHYLPREDNISOLONE SODIUM SUCCINATE SCH MG: 40 INJECTION, POWDER, FOR SOLUTION INTRAMUSCULAR; INTRAVENOUS at 00:10

## 2021-10-16 RX ADMIN — GUAIFENESIN AND DEXTROMETHORPHAN PRN ML: 100; 10 SYRUP ORAL at 20:44

## 2021-10-16 RX ADMIN — Medication SCH MMU: at 20:24

## 2021-10-16 RX ADMIN — ENOXAPARIN SODIUM SCH MG: 100 INJECTION SUBCUTANEOUS at 08:43

## 2021-10-16 RX ADMIN — ENOXAPARIN SODIUM SCH MG: 100 INJECTION SUBCUTANEOUS at 08:44

## 2021-10-16 RX ADMIN — PANTOPRAZOLE SODIUM SCH MG: 40 TABLET, DELAYED RELEASE ORAL at 08:44

## 2021-10-16 RX ADMIN — DOCUSATE SODIUM SCH MG: 100 CAPSULE, LIQUID FILLED ORAL at 09:00

## 2021-10-16 RX ADMIN — DOCUSATE SODIUM SCH MG: 100 CAPSULE, LIQUID FILLED ORAL at 20:24

## 2021-10-16 RX ADMIN — ENOXAPARIN SODIUM SCH MG: 100 INJECTION SUBCUTANEOUS at 20:23

## 2021-10-16 RX ADMIN — GUAIFENESIN AND DEXTROMETHORPHAN PRN ML: 100; 10 SYRUP ORAL at 16:27

## 2021-10-16 RX ADMIN — GUAIFENESIN AND DEXTROMETHORPHAN PRN ML: 100; 10 SYRUP ORAL at 08:41

## 2021-10-16 RX ADMIN — Medication SCH CAN: at 08:44

## 2021-10-16 RX ADMIN — ENOXAPARIN SODIUM SCH MG: 100 INJECTION SUBCUTANEOUS at 20:22

## 2021-10-16 RX ADMIN — Medication SCH CAN: at 16:01

## 2021-10-16 RX ADMIN — Medication SCH MMU: at 08:41

## 2021-10-17 VITALS — DIASTOLIC BLOOD PRESSURE: 56 MMHG | SYSTOLIC BLOOD PRESSURE: 104 MMHG

## 2021-10-17 VITALS — DIASTOLIC BLOOD PRESSURE: 88 MMHG | SYSTOLIC BLOOD PRESSURE: 123 MMHG

## 2021-10-17 VITALS — SYSTOLIC BLOOD PRESSURE: 96 MMHG | DIASTOLIC BLOOD PRESSURE: 61 MMHG

## 2021-10-17 VITALS — DIASTOLIC BLOOD PRESSURE: 59 MMHG | SYSTOLIC BLOOD PRESSURE: 101 MMHG

## 2021-10-17 VITALS — DIASTOLIC BLOOD PRESSURE: 68 MMHG | SYSTOLIC BLOOD PRESSURE: 116 MMHG

## 2021-10-17 VITALS — DIASTOLIC BLOOD PRESSURE: 80 MMHG | SYSTOLIC BLOOD PRESSURE: 115 MMHG

## 2021-10-17 LAB
ALBUMIN SERPL BCP-MCNC: 2.6 G/DL (ref 3.4–5)
ALBUMIN/GLOB SERPL: 0.6 {RATIO} (ref 1.1–1.5)
ALP SERPL-CCNC: 73 IU/L (ref 46–116)
ALT SERPL W P-5'-P-CCNC: 47 U/L (ref 12–78)
ANION GAP SERPL CALCULATED.3IONS-SCNC: 8 MMOL/L (ref 8–16)
AST SERPL W P-5'-P-CCNC: 41 U/L (ref 10–37)
BASOPHILS # BLD AUTO: 0 X10'3 (ref 0–0.2)
BASOPHILS NFR BLD AUTO: 0.2 % (ref 0–1)
BILIRUB SERPL-MCNC: 0.3 MG/DL (ref 0.1–1)
BUN SERPL-MCNC: 14 MG/DL (ref 7–18)
BUN/CREAT SERPL: 18.7 (ref 6.6–38)
CALCIUM SERPL-MCNC: 8.5 MG/DL (ref 8.5–10.1)
CHLORIDE SERPL-SCNC: 103 MMOL/L (ref 99–107)
CO2 SERPL-SCNC: 27.9 MMOL/L (ref 24–32)
CREAT SERPL-MCNC: 0.75 MG/DL (ref 0.4–0.9)
CRP SERPL HS-MCNC: 0.44 MG/DL (ref 0–0.5)
D DIMER PPP FEU-MCNC: 6.1 MG/L FEU (ref 0–0.5)
EOSINOPHIL # BLD AUTO: 0 X10'3 (ref 0–0.9)
EOSINOPHIL NFR BLD AUTO: 0.1 % (ref 0–6)
ERYTHROCYTE [DISTWIDTH] IN BLOOD BY AUTOMATED COUNT: 13.9 % (ref 11.5–14.5)
GFR SERPL CREATININE-BSD FRML MDRD: 80 ML/MIN
GLUCOSE SERPL-MCNC: 115 MG/DL (ref 70–104)
HCT VFR BLD AUTO: 41.5 % (ref 35–45)
HGB BLD-MCNC: 14.2 G/DL (ref 12–16)
LDH SERPL-CCNC: 924 U/L (ref 81–234)
LYMPHOCYTES # BLD AUTO: 0.4 X10'3 (ref 1.1–4.8)
LYMPHOCYTES NFR BLD AUTO: 5 % (ref 21–51)
MCH RBC QN AUTO: 30.3 PG (ref 27–31)
MCHC RBC AUTO-ENTMCNC: 34.1 G/DL (ref 33–36.5)
MCV RBC AUTO: 88.8 FL (ref 78–98)
MONOCYTES # BLD AUTO: 0.3 X10'3 (ref 0–0.9)
MONOCYTES NFR BLD AUTO: 4 % (ref 2–12)
NEUTROPHILS # BLD AUTO: 7.6 X10'3 (ref 1.8–7.7)
NEUTROPHILS NFR BLD AUTO: 90.7 % (ref 42–75)
PLATELET # BLD AUTO: 255 X10'3 (ref 140–440)
PMV BLD AUTO: 9.7 FL (ref 7.4–10.4)
POTASSIUM SERPL-SCNC: 3.8 MMOL/L (ref 3.5–5.1)
PROT SERPL-MCNC: 6.9 G/DL (ref 6.4–8.2)
RBC # BLD AUTO: 4.68 X10'6 (ref 4.2–5.6)
SODIUM SERPL-SCNC: 139 MMOL/L (ref 135–145)
WBC # BLD AUTO: 8.3 X10'3 (ref 4.5–11)

## 2021-10-17 RX ADMIN — GUAIFENESIN AND DEXTROMETHORPHAN PRN ML: 100; 10 SYRUP ORAL at 10:35

## 2021-10-17 RX ADMIN — DOCUSATE SODIUM SCH MG: 100 CAPSULE, LIQUID FILLED ORAL at 20:30

## 2021-10-17 RX ADMIN — GUAIFENESIN AND DEXTROMETHORPHAN PRN ML: 100; 10 SYRUP ORAL at 20:34

## 2021-10-17 RX ADMIN — PANTOPRAZOLE SODIUM SCH MG: 40 TABLET, DELAYED RELEASE ORAL at 10:20

## 2021-10-17 RX ADMIN — Medication SCH CAN: at 18:00

## 2021-10-17 RX ADMIN — Medication SCH CAN: at 12:30

## 2021-10-17 RX ADMIN — METHYLPREDNISOLONE SODIUM SUCCINATE SCH MG: 40 INJECTION, POWDER, FOR SOLUTION INTRAMUSCULAR; INTRAVENOUS at 17:08

## 2021-10-17 RX ADMIN — METHYLPREDNISOLONE SODIUM SUCCINATE SCH MG: 40 INJECTION, POWDER, FOR SOLUTION INTRAMUSCULAR; INTRAVENOUS at 00:30

## 2021-10-17 RX ADMIN — ENOXAPARIN SODIUM SCH MG: 100 INJECTION SUBCUTANEOUS at 20:31

## 2021-10-17 RX ADMIN — ENOXAPARIN SODIUM SCH MG: 100 INJECTION SUBCUTANEOUS at 10:21

## 2021-10-17 RX ADMIN — Medication SCH CAN: at 07:30

## 2021-10-17 RX ADMIN — Medication SCH MMU: at 10:20

## 2021-10-17 RX ADMIN — ENOXAPARIN SODIUM SCH MG: 100 INJECTION SUBCUTANEOUS at 10:20

## 2021-10-17 RX ADMIN — GUAIFENESIN AND DEXTROMETHORPHAN PRN ML: 100; 10 SYRUP ORAL at 02:44

## 2021-10-17 RX ADMIN — ENOXAPARIN SODIUM SCH MG: 100 INJECTION SUBCUTANEOUS at 20:30

## 2021-10-17 RX ADMIN — DOCUSATE SODIUM SCH MG: 100 CAPSULE, LIQUID FILLED ORAL at 08:00

## 2021-10-17 RX ADMIN — METHYLPREDNISOLONE SODIUM SUCCINATE SCH MG: 40 INJECTION, POWDER, FOR SOLUTION INTRAMUSCULAR; INTRAVENOUS at 10:19

## 2021-10-17 RX ADMIN — Medication SCH MMU: at 20:42

## 2021-10-17 NOTE — NUR
Reassessment: Pt remains on BiPAP though PO has improved. Pt mostly 

consumed 50% of meals since 10/13 and 100% of last two meals on Regular 

diet. Pt also consumed 87% x 4 ONS. Pt likely meeting nutrient needs w/ 

recent PO intake. LBM 10/15 receiving routine colace. No new nutrition intervention 
implemented at this time.

Rec:

1. continue regular diet; encourage PO

2. Ensure Enlive TIDWM

3. IF PO regresses given bipap dependence; consider NG feeds to optimize

nutrition status. IF TF; Vital AF at 65ml/hr goal; would provide 1560ml

volume/day, 1872kcals, 1264ml water, and 117g protein. Adjust EN recs once

scaled wt this admit

4. routine bowel care

5. scaled wt this admit; subsequent weekly wts

-------------------------------------------------------------------------------

Addendum: 10/17/21 at 0842 by Nik Chang RD

-------------------------------------------------------------------------------

Amended: Links added.

## 2021-10-17 NOTE — NUR
While rounding  patient was drinking fluid. [place] patient's coughing and went back to the 
room and noted CPAP mask upside down, patient sat between 38-40. Call for help and hold the 
CPAP mask on patient's face, and sat 87-88. Help arrived and fixed CPAP mask. Patient sat 
91-93. Encouraged patient to keep mask on and to spit on the cup for sputum culture. Will 
continue to monitor.

## 2021-10-18 VITALS — SYSTOLIC BLOOD PRESSURE: 104 MMHG | DIASTOLIC BLOOD PRESSURE: 83 MMHG

## 2021-10-18 VITALS — DIASTOLIC BLOOD PRESSURE: 88 MMHG | SYSTOLIC BLOOD PRESSURE: 130 MMHG

## 2021-10-18 VITALS — SYSTOLIC BLOOD PRESSURE: 104 MMHG | DIASTOLIC BLOOD PRESSURE: 70 MMHG

## 2021-10-18 VITALS — SYSTOLIC BLOOD PRESSURE: 124 MMHG | DIASTOLIC BLOOD PRESSURE: 88 MMHG

## 2021-10-18 VITALS — DIASTOLIC BLOOD PRESSURE: 76 MMHG | SYSTOLIC BLOOD PRESSURE: 102 MMHG

## 2021-10-18 VITALS — DIASTOLIC BLOOD PRESSURE: 87 MMHG | SYSTOLIC BLOOD PRESSURE: 117 MMHG

## 2021-10-18 LAB
ALBUMIN SERPL BCP-MCNC: 3 G/DL (ref 3.4–5)
ALBUMIN/GLOB SERPL: 0.7 {RATIO} (ref 1.1–1.5)
ALP SERPL-CCNC: 79 IU/L (ref 46–116)
ALT SERPL W P-5'-P-CCNC: 68 U/L (ref 12–78)
ANION GAP SERPL CALCULATED.3IONS-SCNC: 9 MMOL/L (ref 8–16)
AST SERPL W P-5'-P-CCNC: 40 U/L (ref 10–37)
BASOPHILS # BLD AUTO: 0 X10'3 (ref 0–0.2)
BASOPHILS NFR BLD AUTO: 0.1 % (ref 0–1)
BILIRUB SERPL-MCNC: 0.4 MG/DL (ref 0.1–1)
BUN SERPL-MCNC: 17 MG/DL (ref 7–18)
BUN/CREAT SERPL: 21.3 (ref 6.6–38)
CALCIUM SERPL-MCNC: 9.1 MG/DL (ref 8.5–10.1)
CHLORIDE SERPL-SCNC: 101 MMOL/L (ref 99–107)
CO2 SERPL-SCNC: 30.3 MMOL/L (ref 24–32)
CREAT SERPL-MCNC: 0.8 MG/DL (ref 0.4–0.9)
CRP SERPL HS-MCNC: 0.42 MG/DL (ref 0–0.5)
D DIMER PPP FEU-MCNC: 4.56 MG/L FEU (ref 0–0.5)
EOSINOPHIL # BLD AUTO: 0 X10'3 (ref 0–0.9)
EOSINOPHIL NFR BLD AUTO: 0 % (ref 0–6)
ERYTHROCYTE [DISTWIDTH] IN BLOOD BY AUTOMATED COUNT: 13.9 % (ref 11.5–14.5)
GFR SERPL CREATININE-BSD FRML MDRD: 74 ML/MIN
GLUCOSE SERPL-MCNC: 122 MG/DL (ref 70–104)
HCT VFR BLD AUTO: 46.6 % (ref 35–45)
HGB BLD-MCNC: 15.5 G/DL (ref 12–16)
LDH SERPL-CCNC: 979 U/L (ref 81–234)
LYMPHOCYTES # BLD AUTO: 0.6 X10'3 (ref 1.1–4.8)
LYMPHOCYTES NFR BLD AUTO: 6 % (ref 21–51)
LYMPHOCYTES NFR BLD MANUAL: 8 % (ref 21–51)
MCH RBC QN AUTO: 30.1 PG (ref 27–31)
MCHC RBC AUTO-ENTMCNC: 33.3 G/DL (ref 33–36.5)
MCV RBC AUTO: 90.5 FL (ref 78–98)
MONOCYTES # BLD AUTO: 0.4 X10'3 (ref 0–0.9)
MONOCYTES NFR BLD AUTO: 3.4 % (ref 2–12)
MONOCYTES NFR BLD MANUAL: 2 % (ref 2–12)
NEUTROPHILS # BLD AUTO: 9.7 X10'3 (ref 1.8–7.7)
NEUTROPHILS NFR BLD AUTO: 90.5 % (ref 42–75)
NEUTS BAND # BLD MANUAL: 10 % (ref 0–10)
NEUTS SEG NFR BLD MANUAL: 80 % (ref 42–75)
PLATELET # BLD AUTO: 289 X10'3 (ref 140–440)
PLATELET BLD QL SMEAR: NORMAL
PMV BLD AUTO: 9.8 FL (ref 7.4–10.4)
POLYCHROMASIA BLD QL SMEAR: (no result)
POTASSIUM SERPL-SCNC: 4.2 MMOL/L (ref 3.5–5.1)
PROT SERPL-MCNC: 7.6 G/DL (ref 6.4–8.2)
RBC # BLD AUTO: 5.15 X10'6 (ref 4.2–5.6)
RBC MORPH BLD: (no result)
SCHISTOCYTES BLD QL SMEAR: (no result)
SODIUM SERPL-SCNC: 140 MMOL/L (ref 135–145)
STOMATOCYTES BLD QL SMEAR: (no result)
TOTAL CELLS COUNTED FLD: 100
WBC # BLD AUTO: 10.7 X10'3 (ref 4.5–11)

## 2021-10-18 RX ADMIN — DOCUSATE SODIUM SCH MG: 100 CAPSULE, LIQUID FILLED ORAL at 08:00

## 2021-10-18 RX ADMIN — DOCUSATE SODIUM SCH MG: 100 CAPSULE, LIQUID FILLED ORAL at 20:00

## 2021-10-18 RX ADMIN — Medication SCH CAN: at 18:44

## 2021-10-18 RX ADMIN — Medication SCH MMU: at 20:38

## 2021-10-18 RX ADMIN — IPRATROPIUM BROMIDE AND ALBUTEROL SULFATE SCH ML: .5; 3 SOLUTION RESPIRATORY (INHALATION) at 15:05

## 2021-10-18 RX ADMIN — METHYLPREDNISOLONE SODIUM SUCCINATE SCH MG: 40 INJECTION, POWDER, FOR SOLUTION INTRAMUSCULAR; INTRAVENOUS at 20:39

## 2021-10-18 RX ADMIN — IPRATROPIUM BROMIDE AND ALBUTEROL SULFATE SCH ML: .5; 3 SOLUTION RESPIRATORY (INHALATION) at 20:05

## 2021-10-18 RX ADMIN — Medication SCH CAN: at 13:00

## 2021-10-18 RX ADMIN — PANTOPRAZOLE SODIUM SCH MG: 40 TABLET, DELAYED RELEASE ORAL at 09:41

## 2021-10-18 RX ADMIN — ENOXAPARIN SODIUM SCH MG: 100 INJECTION SUBCUTANEOUS at 09:40

## 2021-10-18 RX ADMIN — METHYLPREDNISOLONE SODIUM SUCCINATE SCH MG: 40 INJECTION, POWDER, FOR SOLUTION INTRAMUSCULAR; INTRAVENOUS at 00:06

## 2021-10-18 RX ADMIN — ENOXAPARIN SODIUM SCH MG: 100 INJECTION SUBCUTANEOUS at 20:37

## 2021-10-18 RX ADMIN — Medication SCH CAN: at 08:00

## 2021-10-18 RX ADMIN — ENOXAPARIN SODIUM SCH MG: 100 INJECTION SUBCUTANEOUS at 20:38

## 2021-10-18 RX ADMIN — BUDESONIDE SCH MG: 0.5 INHALANT RESPIRATORY (INHALATION) at 07:33

## 2021-10-18 RX ADMIN — Medication SCH MMU: at 09:41

## 2021-10-18 RX ADMIN — BUDESONIDE SCH MG: 0.5 INHALANT RESPIRATORY (INHALATION) at 20:05

## 2021-10-18 RX ADMIN — METHYLPREDNISOLONE SODIUM SUCCINATE SCH MG: 40 INJECTION, POWDER, FOR SOLUTION INTRAMUSCULAR; INTRAVENOUS at 09:38

## 2021-10-18 NOTE — NUR
Problems reprioritized. Patient report given, questions answered & plan of care reviewed 
with ABISAI peterson.

## 2021-10-19 VITALS — SYSTOLIC BLOOD PRESSURE: 109 MMHG | DIASTOLIC BLOOD PRESSURE: 73 MMHG

## 2021-10-19 VITALS — SYSTOLIC BLOOD PRESSURE: 123 MMHG | DIASTOLIC BLOOD PRESSURE: 86 MMHG

## 2021-10-19 VITALS — SYSTOLIC BLOOD PRESSURE: 130 MMHG | DIASTOLIC BLOOD PRESSURE: 91 MMHG

## 2021-10-19 VITALS — SYSTOLIC BLOOD PRESSURE: 102 MMHG | DIASTOLIC BLOOD PRESSURE: 70 MMHG

## 2021-10-19 VITALS — SYSTOLIC BLOOD PRESSURE: 114 MMHG | DIASTOLIC BLOOD PRESSURE: 70 MMHG

## 2021-10-19 LAB
ALBUMIN SERPL BCP-MCNC: 3 G/DL (ref 3.4–5)
ALBUMIN/GLOB SERPL: 0.7 {RATIO} (ref 1.1–1.5)
ALP SERPL-CCNC: 76 IU/L (ref 46–116)
ALT SERPL W P-5'-P-CCNC: 73 U/L (ref 12–78)
ANION GAP SERPL CALCULATED.3IONS-SCNC: 6 MMOL/L (ref 8–16)
AST SERPL W P-5'-P-CCNC: 35 U/L (ref 10–37)
BASOPHILS # BLD AUTO: 0 X10'3 (ref 0–0.2)
BASOPHILS NFR BLD AUTO: 0.2 % (ref 0–1)
BILIRUB SERPL-MCNC: 0.4 MG/DL (ref 0.1–1)
BUN SERPL-MCNC: 17 MG/DL (ref 7–18)
BUN/CREAT SERPL: 20.2 (ref 6.6–38)
CALCIUM SERPL-MCNC: 9.2 MG/DL (ref 8.5–10.1)
CHLORIDE SERPL-SCNC: 96 MMOL/L (ref 99–107)
CO2 SERPL-SCNC: 32.2 MMOL/L (ref 24–32)
CREAT SERPL-MCNC: 0.84 MG/DL (ref 0.4–0.9)
CRP SERPL HS-MCNC: 0.38 MG/DL (ref 0–0.5)
D DIMER PPP FEU-MCNC: 4.17 MG/L FEU (ref 0–0.5)
EOSINOPHIL # BLD AUTO: 0 X10'3 (ref 0–0.9)
EOSINOPHIL NFR BLD AUTO: 0.1 % (ref 0–6)
ERYTHROCYTE [DISTWIDTH] IN BLOOD BY AUTOMATED COUNT: 14.3 % (ref 11.5–14.5)
GFR SERPL CREATININE-BSD FRML MDRD: 70 ML/MIN
GLUCOSE SERPL-MCNC: 114 MG/DL (ref 70–104)
HCT VFR BLD AUTO: 46.6 % (ref 35–45)
HGB BLD-MCNC: 15.3 G/DL (ref 12–16)
LDH SERPL-CCNC: 934 U/L (ref 81–234)
LYMPHOCYTES # BLD AUTO: 0.6 X10'3 (ref 1.1–4.8)
LYMPHOCYTES NFR BLD AUTO: 6.6 % (ref 21–51)
LYMPHOCYTES NFR BLD MANUAL: 6 % (ref 21–51)
MCH RBC QN AUTO: 29.7 PG (ref 27–31)
MCHC RBC AUTO-ENTMCNC: 32.9 G/DL (ref 33–36.5)
MCV RBC AUTO: 90.4 FL (ref 78–98)
METAMYELOCYTES NFR BLD MANUAL: 3 % (ref 0–0)
MONOCYTES # BLD AUTO: 0.2 X10'3 (ref 0–0.9)
MONOCYTES NFR BLD AUTO: 2.6 % (ref 2–12)
MONOCYTES NFR BLD MANUAL: 1 % (ref 2–12)
NEUTROPHILS # BLD AUTO: 8.6 X10'3 (ref 1.8–7.7)
NEUTROPHILS NFR BLD AUTO: 90.5 % (ref 42–75)
NEUTS BAND # BLD MANUAL: 3 % (ref 0–10)
NEUTS SEG NFR BLD MANUAL: 87 % (ref 42–75)
PLATELET # BLD AUTO: 280 X10'3 (ref 140–440)
PLATELET BLD QL SMEAR: NORMAL
PMV BLD AUTO: 10 FL (ref 7.4–10.4)
POTASSIUM SERPL-SCNC: 3.9 MMOL/L (ref 3.5–5.1)
PROT SERPL-MCNC: 7.5 G/DL (ref 6.4–8.2)
RBC # BLD AUTO: 5.15 X10'6 (ref 4.2–5.6)
RBC MORPH BLD: NORMAL
SODIUM SERPL-SCNC: 134 MMOL/L (ref 135–145)
TOTAL CELLS COUNTED FLD: 100
WBC # BLD AUTO: 9.5 X10'3 (ref 4.5–11)

## 2021-10-19 PROCEDURE — 5A09457 ASSISTANCE WITH RESPIRATORY VENTILATION, 24-96 CONSECUTIVE HOURS, CONTINUOUS POSITIVE AIRWAY PRESSURE: ICD-10-PCS | Performed by: INTERNAL MEDICINE

## 2021-10-19 RX ADMIN — Medication SCH MMU: at 20:25

## 2021-10-19 RX ADMIN — ENOXAPARIN SODIUM SCH MG: 100 INJECTION SUBCUTANEOUS at 09:12

## 2021-10-19 RX ADMIN — Medication SCH CAN: at 12:42

## 2021-10-19 RX ADMIN — METHYLPREDNISOLONE SODIUM SUCCINATE SCH MG: 40 INJECTION, POWDER, FOR SOLUTION INTRAMUSCULAR; INTRAVENOUS at 09:11

## 2021-10-19 RX ADMIN — IPRATROPIUM BROMIDE AND ALBUTEROL SULFATE SCH ML: .5; 3 SOLUTION RESPIRATORY (INHALATION) at 15:00

## 2021-10-19 RX ADMIN — IPRATROPIUM BROMIDE AND ALBUTEROL SULFATE SCH ML: .5; 3 SOLUTION RESPIRATORY (INHALATION) at 02:59

## 2021-10-19 RX ADMIN — ENOXAPARIN SODIUM SCH MG: 100 INJECTION SUBCUTANEOUS at 09:11

## 2021-10-19 RX ADMIN — PANTOPRAZOLE SODIUM SCH MG: 40 TABLET, DELAYED RELEASE ORAL at 09:11

## 2021-10-19 RX ADMIN — ENOXAPARIN SODIUM SCH MG: 100 INJECTION SUBCUTANEOUS at 20:24

## 2021-10-19 RX ADMIN — Medication SCH CAN: at 18:21

## 2021-10-19 RX ADMIN — DOCUSATE SODIUM SCH MG: 100 CAPSULE, LIQUID FILLED ORAL at 08:00

## 2021-10-19 RX ADMIN — Medication SCH CAN: at 08:00

## 2021-10-19 RX ADMIN — IPRATROPIUM BROMIDE AND ALBUTEROL SULFATE SCH ML: .5; 3 SOLUTION RESPIRATORY (INHALATION) at 10:30

## 2021-10-19 RX ADMIN — IPRATROPIUM BROMIDE AND ALBUTEROL SULFATE SCH ML: .5; 3 SOLUTION RESPIRATORY (INHALATION) at 20:05

## 2021-10-19 RX ADMIN — Medication SCH MMU: at 09:11

## 2021-10-19 RX ADMIN — METHYLPREDNISOLONE SODIUM SUCCINATE SCH MG: 40 INJECTION, POWDER, FOR SOLUTION INTRAMUSCULAR; INTRAVENOUS at 20:25

## 2021-10-19 RX ADMIN — BUDESONIDE SCH MG: 0.5 INHALANT RESPIRATORY (INHALATION) at 10:30

## 2021-10-19 RX ADMIN — BUDESONIDE SCH MG: 0.5 INHALANT RESPIRATORY (INHALATION) at 20:05

## 2021-10-19 RX ADMIN — DOCUSATE SODIUM SCH MG: 100 CAPSULE, LIQUID FILLED ORAL at 20:00

## 2021-10-19 NOTE — NUR
Page Sent

 

PAGER ID:  [phone] 

MESSAGE:  BARBER [phone] RE: MORRIS GHAZALA [id number] CAN I GET AN ORDER FOR MORPHINE FOR AIR 
HUNGER? PT IS SAYING SHE CANNOT BREATHE, BUT SATTING AT ABOUT 86%, ON THE VERGE OF A PANIC 
ATTACK. THANK YOU!

## 2021-10-20 VITALS — DIASTOLIC BLOOD PRESSURE: 71 MMHG | SYSTOLIC BLOOD PRESSURE: 106 MMHG

## 2021-10-20 VITALS — SYSTOLIC BLOOD PRESSURE: 147 MMHG | DIASTOLIC BLOOD PRESSURE: 83 MMHG

## 2021-10-20 VITALS — DIASTOLIC BLOOD PRESSURE: 78 MMHG | SYSTOLIC BLOOD PRESSURE: 129 MMHG

## 2021-10-20 VITALS — SYSTOLIC BLOOD PRESSURE: 111 MMHG | DIASTOLIC BLOOD PRESSURE: 60 MMHG

## 2021-10-20 VITALS — DIASTOLIC BLOOD PRESSURE: 84 MMHG | SYSTOLIC BLOOD PRESSURE: 150 MMHG

## 2021-10-20 VITALS — SYSTOLIC BLOOD PRESSURE: 97 MMHG | DIASTOLIC BLOOD PRESSURE: 70 MMHG

## 2021-10-20 LAB
ALBUMIN SERPL BCP-MCNC: 3.1 G/DL (ref 3.4–5)
ALBUMIN/GLOB SERPL: 0.7 {RATIO} (ref 1.1–1.5)
ALP SERPL-CCNC: 76 IU/L (ref 46–116)
ALT SERPL W P-5'-P-CCNC: 87 U/L (ref 12–78)
ANION GAP SERPL CALCULATED.3IONS-SCNC: 9 MMOL/L (ref 8–16)
AST SERPL W P-5'-P-CCNC: 31 U/L (ref 10–37)
BASOPHILS # BLD AUTO: 0 X10'3 (ref 0–0.2)
BASOPHILS NFR BLD AUTO: 0.3 % (ref 0–1)
BILIRUB SERPL-MCNC: 0.5 MG/DL (ref 0.1–1)
BUN SERPL-MCNC: 15 MG/DL (ref 7–18)
BUN/CREAT SERPL: 18.8 (ref 6.6–38)
CALCIUM SERPL-MCNC: 9.2 MG/DL (ref 8.5–10.1)
CHLORIDE SERPL-SCNC: 101 MMOL/L (ref 99–107)
CO2 SERPL-SCNC: 30.1 MMOL/L (ref 24–32)
CREAT SERPL-MCNC: 0.8 MG/DL (ref 0.4–0.9)
CRP SERPL HS-MCNC: 0.38 MG/DL (ref 0–0.5)
D DIMER PPP FEU-MCNC: 3.8 MG/L FEU (ref 0–0.5)
EOSINOPHIL # BLD AUTO: 0 X10'3 (ref 0–0.9)
EOSINOPHIL NFR BLD AUTO: 0.2 % (ref 0–6)
ERYTHROCYTE [DISTWIDTH] IN BLOOD BY AUTOMATED COUNT: 14.1 % (ref 11.5–14.5)
GFR SERPL CREATININE-BSD FRML MDRD: 74 ML/MIN
GLUCOSE SERPL-MCNC: 110 MG/DL (ref 70–104)
HCT VFR BLD AUTO: 46.9 % (ref 35–45)
HGB BLD-MCNC: 15.6 G/DL (ref 12–16)
LDH SERPL-CCNC: 837 U/L (ref 81–234)
LG PLATELETS BLD QL SMEAR: (no result)
LYMPHOCYTES # BLD AUTO: 0.7 X10'3 (ref 1.1–4.8)
LYMPHOCYTES NFR BLD AUTO: 7.2 % (ref 21–51)
LYMPHOCYTES NFR BLD MANUAL: 6 % (ref 21–51)
MCH RBC QN AUTO: 30.2 PG (ref 27–31)
MCHC RBC AUTO-ENTMCNC: 33.4 G/DL (ref 33–36.5)
MCV RBC AUTO: 90.5 FL (ref 78–98)
METAMYELOCYTES NFR BLD MANUAL: 1 % (ref 0–0)
MONOCYTES # BLD AUTO: 0.2 X10'3 (ref 0–0.9)
MONOCYTES NFR BLD AUTO: 1.6 % (ref 2–12)
MONOCYTES NFR BLD MANUAL: 3 % (ref 2–12)
NEUTROPHILS # BLD AUTO: 8.6 X10'3 (ref 1.8–7.7)
NEUTROPHILS NFR BLD AUTO: 90.7 % (ref 42–75)
NEUTS BAND # BLD MANUAL: 1 % (ref 0–10)
NEUTS SEG NFR BLD MANUAL: 89 % (ref 42–75)
PLATELET # BLD AUTO: 261 X10'3 (ref 140–440)
PLATELET BLD QL SMEAR: NORMAL
PMV BLD AUTO: 9.4 FL (ref 7.4–10.4)
POTASSIUM SERPL-SCNC: 3.9 MMOL/L (ref 3.5–5.1)
PROT SERPL-MCNC: 7.6 G/DL (ref 6.4–8.2)
RBC # BLD AUTO: 5.17 X10'6 (ref 4.2–5.6)
RBC MORPH BLD: NORMAL
SODIUM SERPL-SCNC: 140 MMOL/L (ref 135–145)
TOTAL CELLS COUNTED FLD: 100
WBC # BLD AUTO: 9.4 X10'3 (ref 4.5–11)

## 2021-10-20 RX ADMIN — IPRATROPIUM BROMIDE AND ALBUTEROL SULFATE SCH ML: .5; 3 SOLUTION RESPIRATORY (INHALATION) at 10:08

## 2021-10-20 RX ADMIN — BUDESONIDE SCH MG: 0.5 INHALANT RESPIRATORY (INHALATION) at 21:27

## 2021-10-20 RX ADMIN — IPRATROPIUM BROMIDE AND ALBUTEROL SULFATE SCH ML: .5; 3 SOLUTION RESPIRATORY (INHALATION) at 21:27

## 2021-10-20 RX ADMIN — METHYLPREDNISOLONE SODIUM SUCCINATE SCH MG: 40 INJECTION, POWDER, FOR SOLUTION INTRAMUSCULAR; INTRAVENOUS at 07:52

## 2021-10-20 RX ADMIN — IPRATROPIUM BROMIDE AND ALBUTEROL SULFATE SCH ML: .5; 3 SOLUTION RESPIRATORY (INHALATION) at 03:47

## 2021-10-20 RX ADMIN — ENOXAPARIN SODIUM SCH MG: 100 INJECTION SUBCUTANEOUS at 07:51

## 2021-10-20 RX ADMIN — IPRATROPIUM BROMIDE AND ALBUTEROL SULFATE SCH ML: .5; 3 SOLUTION RESPIRATORY (INHALATION) at 17:25

## 2021-10-20 RX ADMIN — BUDESONIDE SCH MG: 0.5 INHALANT RESPIRATORY (INHALATION) at 10:07

## 2021-10-20 RX ADMIN — Medication SCH CAN: at 07:50

## 2021-10-20 RX ADMIN — HYDROCODONE BITARTRATE AND ACETAMINOPHEN PRN ML: 7.5; 325 SOLUTION ORAL at 19:30

## 2021-10-20 RX ADMIN — Medication SCH CAN: at 18:15

## 2021-10-20 RX ADMIN — PANTOPRAZOLE SODIUM SCH MG: 40 TABLET, DELAYED RELEASE ORAL at 07:51

## 2021-10-20 RX ADMIN — DOCUSATE SODIUM SCH MG: 100 CAPSULE, LIQUID FILLED ORAL at 07:51

## 2021-10-20 RX ADMIN — Medication SCH MMU: at 07:51

## 2021-10-20 RX ADMIN — DOCUSATE SODIUM SCH MG: 100 CAPSULE, LIQUID FILLED ORAL at 19:33

## 2021-10-20 RX ADMIN — ENOXAPARIN SODIUM SCH MG: 100 INJECTION SUBCUTANEOUS at 19:31

## 2021-10-20 RX ADMIN — ENOXAPARIN SODIUM SCH MG: 100 INJECTION SUBCUTANEOUS at 07:52

## 2021-10-20 RX ADMIN — ENOXAPARIN SODIUM SCH MG: 100 INJECTION SUBCUTANEOUS at 19:32

## 2021-10-20 RX ADMIN — Medication SCH MMU: at 19:32

## 2021-10-20 RX ADMIN — Medication SCH CAN: at 13:27

## 2021-10-20 NOTE — NUR
Dr. Behl has been called regarding pt. .She order EKG .She d/c Albuterol treatment and 
order Xopenex ,and Atarax 25mg for anxiety.We will continue with pt. care.

## 2021-10-20 NOTE — NUR
Patient in room ORTHO 4012. I have received report from ABISAI Holm   and had the opportunity to 
ask questions and assume patient care.

## 2021-10-21 VITALS — DIASTOLIC BLOOD PRESSURE: 58 MMHG | SYSTOLIC BLOOD PRESSURE: 93 MMHG

## 2021-10-21 VITALS — DIASTOLIC BLOOD PRESSURE: 55 MMHG | SYSTOLIC BLOOD PRESSURE: 98 MMHG

## 2021-10-21 VITALS — DIASTOLIC BLOOD PRESSURE: 78 MMHG | SYSTOLIC BLOOD PRESSURE: 112 MMHG

## 2021-10-21 VITALS — DIASTOLIC BLOOD PRESSURE: 86 MMHG | SYSTOLIC BLOOD PRESSURE: 118 MMHG

## 2021-10-21 VITALS — SYSTOLIC BLOOD PRESSURE: 110 MMHG | DIASTOLIC BLOOD PRESSURE: 76 MMHG

## 2021-10-21 LAB
CRP SERPL HS-MCNC: 0.51 MG/DL (ref 0–0.5)
D DIMER PPP FEU-MCNC: 5.49 MG/L FEU (ref 0–0.5)
LDH SERPL-CCNC: 966 U/L (ref 81–234)

## 2021-10-21 RX ADMIN — Medication SCH CAN: at 18:52

## 2021-10-21 RX ADMIN — LEVALBUTEROL SCH MG: 0.63 SOLUTION RESPIRATORY (INHALATION) at 16:26

## 2021-10-21 RX ADMIN — ENOXAPARIN SODIUM SCH MG: 100 INJECTION SUBCUTANEOUS at 19:50

## 2021-10-21 RX ADMIN — BUDESONIDE SCH MG: 0.5 INHALANT RESPIRATORY (INHALATION) at 09:22

## 2021-10-21 RX ADMIN — LEVALBUTEROL SCH MG: 0.63 SOLUTION RESPIRATORY (INHALATION) at 09:22

## 2021-10-21 RX ADMIN — LEVALBUTEROL SCH MG: 0.63 SOLUTION RESPIRATORY (INHALATION) at 20:40

## 2021-10-21 RX ADMIN — PANTOPRAZOLE SODIUM SCH MG: 40 TABLET, DELAYED RELEASE ORAL at 07:33

## 2021-10-21 RX ADMIN — DOCUSATE SODIUM SCH MG: 100 CAPSULE, LIQUID FILLED ORAL at 19:49

## 2021-10-21 RX ADMIN — Medication SCH MMU: at 07:33

## 2021-10-21 RX ADMIN — DOCUSATE SODIUM SCH MG: 100 CAPSULE, LIQUID FILLED ORAL at 07:32

## 2021-10-21 RX ADMIN — BUDESONIDE SCH MG: 0.5 INHALANT RESPIRATORY (INHALATION) at 20:40

## 2021-10-21 RX ADMIN — ENOXAPARIN SODIUM SCH MG: 100 INJECTION SUBCUTANEOUS at 07:32

## 2021-10-21 RX ADMIN — Medication SCH CAN: at 08:00

## 2021-10-21 RX ADMIN — Medication SCH MMU: at 19:49

## 2021-10-21 RX ADMIN — Medication SCH MG: at 07:32

## 2021-10-21 RX ADMIN — LEVALBUTEROL SCH MG: 0.63 SOLUTION RESPIRATORY (INHALATION) at 02:45

## 2021-10-21 RX ADMIN — IPRATROPIUM BROMIDE AND ALBUTEROL SULFATE SCH ML: .5; 3 SOLUTION RESPIRATORY (INHALATION) at 09:00

## 2021-10-21 RX ADMIN — HYDROCODONE BITARTRATE AND ACETAMINOPHEN PRN ML: 7.5; 325 SOLUTION ORAL at 07:31

## 2021-10-21 RX ADMIN — IPRATROPIUM BROMIDE AND ALBUTEROL SULFATE SCH ML: .5; 3 SOLUTION RESPIRATORY (INHALATION) at 15:00

## 2021-10-21 RX ADMIN — Medication SCH CAN: at 13:08

## 2021-10-21 NOTE — NUR
Problems reprioritized. Patient report given, questions answered & plan of care reviewed 
with ABISAI Holm.

## 2021-10-21 NOTE — NUR
Reassessment: Pt remains on CPAP at 100% per EMR. PO intake remains mostly 

similar, avg 48% intake of meals or Regular diet and 100% of ONS. Pt 

likely meeting nutrient needs w/ recent PO intake and ONS. LBM 10/19 

receiving routine colace. No new nutrition intervention implemented at 

this time, will continue to monitor.

Rec:

1. continue regular diet; encourage PO

2. Ensure Enlive TIDWM

3. IF PO regresses given bipap dependence; consider NG feeds to optimize

nutrition status. IF TF; Vital AF at 65ml/hr goal; would provide 1560ml

volume/day, 1872kcals, 1264ml water, and 117g protein. Adjust EN recs once

scaled wt this admit

4. routine bowel care

5. scaled wt this admit; subsequent weekly wts

-------------------------------------------------------------------------------

Addendum: 10/21/21 at 0915 by Nik Chang RD

-------------------------------------------------------------------------------

Amended: Links added. Detail Level: Detailed

## 2021-10-22 VITALS — SYSTOLIC BLOOD PRESSURE: 112 MMHG | DIASTOLIC BLOOD PRESSURE: 77 MMHG

## 2021-10-22 VITALS — DIASTOLIC BLOOD PRESSURE: 76 MMHG | SYSTOLIC BLOOD PRESSURE: 141 MMHG

## 2021-10-22 VITALS — SYSTOLIC BLOOD PRESSURE: 124 MMHG | DIASTOLIC BLOOD PRESSURE: 75 MMHG

## 2021-10-22 VITALS — DIASTOLIC BLOOD PRESSURE: 76 MMHG | SYSTOLIC BLOOD PRESSURE: 135 MMHG

## 2021-10-22 VITALS — SYSTOLIC BLOOD PRESSURE: 133 MMHG | DIASTOLIC BLOOD PRESSURE: 80 MMHG

## 2021-10-22 VITALS — DIASTOLIC BLOOD PRESSURE: 75 MMHG | SYSTOLIC BLOOD PRESSURE: 124 MMHG

## 2021-10-22 VITALS — DIASTOLIC BLOOD PRESSURE: 74 MMHG | SYSTOLIC BLOOD PRESSURE: 128 MMHG

## 2021-10-22 VITALS — DIASTOLIC BLOOD PRESSURE: 78 MMHG | SYSTOLIC BLOOD PRESSURE: 111 MMHG

## 2021-10-22 VITALS — DIASTOLIC BLOOD PRESSURE: 98 MMHG | SYSTOLIC BLOOD PRESSURE: 141 MMHG

## 2021-10-22 VITALS — DIASTOLIC BLOOD PRESSURE: 100 MMHG | SYSTOLIC BLOOD PRESSURE: 140 MMHG

## 2021-10-22 VITALS — SYSTOLIC BLOOD PRESSURE: 135 MMHG | DIASTOLIC BLOOD PRESSURE: 76 MMHG

## 2021-10-22 VITALS — DIASTOLIC BLOOD PRESSURE: 89 MMHG | SYSTOLIC BLOOD PRESSURE: 137 MMHG

## 2021-10-22 VITALS — SYSTOLIC BLOOD PRESSURE: 128 MMHG | DIASTOLIC BLOOD PRESSURE: 80 MMHG

## 2021-10-22 VITALS — DIASTOLIC BLOOD PRESSURE: 51 MMHG | SYSTOLIC BLOOD PRESSURE: 81 MMHG

## 2021-10-22 VITALS — SYSTOLIC BLOOD PRESSURE: 97 MMHG | DIASTOLIC BLOOD PRESSURE: 59 MMHG

## 2021-10-22 VITALS — DIASTOLIC BLOOD PRESSURE: 77 MMHG | SYSTOLIC BLOOD PRESSURE: 118 MMHG

## 2021-10-22 VITALS — SYSTOLIC BLOOD PRESSURE: 130 MMHG | DIASTOLIC BLOOD PRESSURE: 75 MMHG

## 2021-10-22 VITALS — DIASTOLIC BLOOD PRESSURE: 63 MMHG | SYSTOLIC BLOOD PRESSURE: 110 MMHG

## 2021-10-22 VITALS — DIASTOLIC BLOOD PRESSURE: 68 MMHG | SYSTOLIC BLOOD PRESSURE: 115 MMHG

## 2021-10-22 VITALS — DIASTOLIC BLOOD PRESSURE: 80 MMHG | SYSTOLIC BLOOD PRESSURE: 134 MMHG

## 2021-10-22 VITALS — SYSTOLIC BLOOD PRESSURE: 135 MMHG | DIASTOLIC BLOOD PRESSURE: 78 MMHG

## 2021-10-22 VITALS — SYSTOLIC BLOOD PRESSURE: 115 MMHG | DIASTOLIC BLOOD PRESSURE: 64 MMHG

## 2021-10-22 LAB
ALBUMIN SERPL BCP-MCNC: 2.5 G/DL (ref 3.4–5)
ALBUMIN SERPL BCP-MCNC: 2.5 G/DL (ref 3.4–5)
ALBUMIN/GLOB SERPL: 0.6 {RATIO} (ref 1.1–1.5)
ALBUMIN/GLOB SERPL: 0.7 {RATIO} (ref 1.1–1.5)
ALP SERPL-CCNC: 89 IU/L (ref 46–116)
ALP SERPL-CCNC: 91 IU/L (ref 46–116)
ALT SERPL W P-5'-P-CCNC: 77 U/L (ref 12–78)
ALT SERPL W P-5'-P-CCNC: 96 U/L (ref 12–78)
ANION GAP SERPL CALCULATED.3IONS-SCNC: 16 MMOL/L (ref 8–16)
ANION GAP SERPL CALCULATED.3IONS-SCNC: 6 MMOL/L (ref 8–16)
ARTERIAL PATENCY WRIST A: POSITIVE
AST SERPL W P-5'-P-CCNC: 50 U/L (ref 10–37)
AST SERPL W P-5'-P-CCNC: 66 U/L (ref 10–37)
BASE EXCESS BLDA CALC-SCNC: -16.9 MMOL/L (ref -2–2)
BASE EXCESS BLDA CALC-SCNC: -3.5 MMOL/L (ref -2–2)
BASE EXCESS BLDA CALC-SCNC: 1.2 MMOL/L (ref -2–2)
BASE EXCESS BLDA CALC-SCNC: 4.7 MMOL/L (ref -2–2)
BASE EXCESS BLDA CALC-SCNC: 5.5 MMOL/L (ref -2–2)
BASE EXCESS BLDA CALC-SCNC: 8.2 MMOL/L (ref -2–2)
BASOPHILS # BLD AUTO: 0 X10'3 (ref 0–0.2)
BASOPHILS NFR BLD AUTO: 0 % (ref 0–1)
BASOPHILS NFR BLD AUTO: 0.3 % (ref 0–1)
BASOPHILS NFR BLD AUTO: 0.4 % (ref 0–1)
BILIRUB SERPL-MCNC: 0.5 MG/DL (ref 0.1–1)
BILIRUB SERPL-MCNC: 0.6 MG/DL (ref 0.1–1)
BODY TEMPERATURE: 36.3
BODY TEMPERATURE: 36.4
BODY TEMPERATURE: 37
BODY TEMPERATURE: 37
BODY TEMPERATURE: 38.3
BODY TEMPERATURE: 38.9
BUN SERPL-MCNC: 17 MG/DL (ref 7–18)
BUN SERPL-MCNC: 20 MG/DL (ref 7–18)
BUN/CREAT SERPL: 12 (ref 6.6–38)
BUN/CREAT SERPL: 17.9 (ref 6.6–38)
CALCIUM SERPL-MCNC: 7.9 MG/DL (ref 8.5–10.1)
CALCIUM SERPL-MCNC: 8.1 MG/DL (ref 8.5–10.1)
CHLORIDE SERPL-SCNC: 100 MMOL/L (ref 99–107)
CHLORIDE SERPL-SCNC: 104 MMOL/L (ref 99–107)
CO2 SERPL-SCNC: 30.4 MMOL/L (ref 24–32)
CO2 SERPL-SCNC: 33.7 MMOL/L (ref 24–32)
COHGB MFR BLDA: 1 % (ref 0–3.9)
COHGB MFR BLDA: 1.2 % (ref 0–3.9)
COHGB MFR BLDA: 1.2 % (ref 0–3.9)
COHGB MFR BLDA: 1.3 % (ref 0–3.9)
COHGB MFR BLDA: 1.3 % (ref 0–3.9)
COHGB MFR BLDA: 1.8 % (ref 0–3.9)
CREAT SERPL-MCNC: 1.12 MG/DL (ref 0.4–0.9)
CREAT SERPL-MCNC: 1.42 MG/DL (ref 0.4–0.9)
CRP SERPL HS-MCNC: 1.34 MG/DL (ref 0–0.5)
D DIMER PPP FEU-MCNC: 5.59 MG/L FEU (ref 0–0.5)
EOSINOPHIL # BLD AUTO: 0.1 X10'3 (ref 0–0.9)
EOSINOPHIL # BLD AUTO: 0.2 X10'3 (ref 0–0.9)
EOSINOPHIL # BLD AUTO: 0.2 X10'3 (ref 0–0.9)
EOSINOPHIL NFR BLD AUTO: 1 % (ref 0–6)
EOSINOPHIL NFR BLD AUTO: 1.2 % (ref 0–6)
EOSINOPHIL NFR BLD AUTO: 2.3 % (ref 0–6)
ERYTHROCYTE [DISTWIDTH] IN BLOOD BY AUTOMATED COUNT: 14 % (ref 11.5–14.5)
ERYTHROCYTE [DISTWIDTH] IN BLOOD BY AUTOMATED COUNT: 14.3 % (ref 11.5–14.5)
ERYTHROCYTE [DISTWIDTH] IN BLOOD BY AUTOMATED COUNT: 14.6 % (ref 11.5–14.5)
GFR SERPL CREATININE-BSD FRML MDRD: 38 ML/MIN
GFR SERPL CREATININE-BSD FRML MDRD: 50 ML/MIN
GLUCOSE SERPL-MCNC: 202 MG/DL (ref 70–104)
GLUCOSE SERPL-MCNC: 343 MG/DL (ref 70–104)
HBA1C MFR BLD: 6.8 % (ref 4.5–6.2)
HCO3 BLDA-SCNC: 14.9 MMOL/L (ref 22–26)
HCO3 BLDA-SCNC: 25.4 MMOL/L (ref 22–26)
HCO3 BLDA-SCNC: 26.3 MMOL/L (ref 22–26)
HCO3 BLDA-SCNC: 30 MMOL/L (ref 22–26)
HCO3 BLDA-SCNC: 31.6 MMOL/L (ref 22–26)
HCO3 BLDA-SCNC: 31.9 MMOL/L (ref 22–26)
HCT VFR BLD AUTO: 40.7 % (ref 35–45)
HCT VFR BLD AUTO: 43.1 % (ref 35–45)
HCT VFR BLD AUTO: 46.8 % (ref 35–45)
HGB BLD-MCNC: 13.7 G/DL (ref 12–16)
HGB BLD-MCNC: 14.1 G/DL (ref 12–16)
HGB BLD-MCNC: 16.1 G/DL (ref 12–16)
HGB BLDA-MCNC: 13.8 G/DL (ref 12–16)
HGB BLDA-MCNC: 14.6 G/DL (ref 12–16)
HGB BLDA-MCNC: 14.9 G/DL (ref 12–16)
HGB BLDA-MCNC: 15.1 G/DL (ref 12–16)
HGB BLDA-MCNC: 16 G/DL (ref 12–16)
HGB BLDA-MCNC: 16.3 G/DL (ref 12–16)
LDH SERPL-CCNC: 967 U/L (ref 81–234)
LYMPHOCYTES # BLD AUTO: 0.3 X10'3 (ref 1.1–4.8)
LYMPHOCYTES # BLD AUTO: 0.6 X10'3 (ref 1.1–4.8)
LYMPHOCYTES # BLD AUTO: 0.8 X10'3 (ref 1.1–4.8)
LYMPHOCYTES NFR BLD AUTO: 2.1 % (ref 21–51)
LYMPHOCYTES NFR BLD AUTO: 6.2 % (ref 21–51)
LYMPHOCYTES NFR BLD AUTO: 6.3 % (ref 21–51)
MAGNESIUM SERPL-MCNC: 2 MG/DL (ref 1.5–2.4)
MAGNESIUM SERPL-MCNC: 2.2 MG/DL (ref 1.5–2.4)
MCH RBC QN AUTO: 29.9 PG (ref 27–31)
MCH RBC QN AUTO: 29.9 PG (ref 27–31)
MCH RBC QN AUTO: 30.9 PG (ref 27–31)
MCHC RBC AUTO-ENTMCNC: 32.8 G/DL (ref 33–36.5)
MCHC RBC AUTO-ENTMCNC: 33.6 G/DL (ref 33–36.5)
MCHC RBC AUTO-ENTMCNC: 34.4 G/DL (ref 33–36.5)
MCV RBC AUTO: 88.7 FL (ref 78–98)
MCV RBC AUTO: 89.8 FL (ref 78–98)
MCV RBC AUTO: 91.1 FL (ref 78–98)
METHGB MFR BLDA: 0.1 % (ref 0–1.5)
METHGB MFR BLDA: 0.3 % (ref 0–1.5)
METHGB MFR BLDA: 0.3 % (ref 0–1.5)
METHGB MFR BLDA: 0.4 % (ref 0–1.5)
MONOCYTES # BLD AUTO: 0.2 X10'3 (ref 0–0.9)
MONOCYTES # BLD AUTO: 0.2 X10'3 (ref 0–0.9)
MONOCYTES # BLD AUTO: 0.4 X10'3 (ref 0–0.9)
MONOCYTES NFR BLD AUTO: 1.8 % (ref 2–12)
MONOCYTES NFR BLD AUTO: 1.8 % (ref 2–12)
MONOCYTES NFR BLD AUTO: 2.7 % (ref 2–12)
NEUTROPHILS # BLD AUTO: 11.5 X10'3 (ref 1.8–7.7)
NEUTROPHILS # BLD AUTO: 13.1 X10'3 (ref 1.8–7.7)
NEUTROPHILS # BLD AUTO: 8.4 X10'3 (ref 1.8–7.7)
NEUTROPHILS NFR BLD AUTO: 89.2 % (ref 42–75)
NEUTROPHILS NFR BLD AUTO: 90.5 % (ref 42–75)
NEUTROPHILS NFR BLD AUTO: 94.2 % (ref 42–75)
O2/TOTAL GAS SETTING VFR VENT: 100 MMHG/%
OXYHGB MFR BLDA: 29.6 % (ref 94–97)
OXYHGB MFR BLDA: 30.6 % (ref 94–97)
OXYHGB MFR BLDA: 73.4 % (ref 94–97)
OXYHGB MFR BLDA: 79 % (ref 94–97)
OXYHGB MFR BLDA: 92.3 % (ref 94–97)
OXYHGB MFR BLDA: 95 % (ref 94–97)
PCO2 TEMP ADJ BLDA: 41.6 MMHG (ref 32–45)
PCO2 TEMP ADJ BLDA: 41.7 MMHG (ref 32–45)
PCO2 TEMP ADJ BLDA: 42.5 MMHG (ref 32–45)
PCO2 TEMP ADJ BLDA: 56.3 MMHG (ref 32–45)
PCO2 TEMP ADJ BLDA: 61.9 MMHG (ref 32–45)
PCO2 TEMP ADJ BLDA: 69.2 MMHG (ref 32–45)
PEEP RESPIRATORY: 14 CM H2O
PEEP RESPIRATORY: 15 CM H2O
PEEP RESPIRATORY: 16 CM H2O
PEEP RESPIRATORY: 20 CM H2O
PHOSPHATE SERPL-MCNC: 4.3 MG/DL (ref 2.3–4.5)
PHOSPHATE SERPL-MCNC: 7.1 MG/DL (ref 2.3–4.5)
PLATELET # BLD AUTO: 209 X10'3 (ref 140–440)
PLATELET # BLD AUTO: 217 X10'3 (ref 140–440)
PLATELET # BLD AUTO: 238 X10'3 (ref 140–440)
PMV BLD AUTO: 9.4 FL (ref 7.4–10.4)
PMV BLD AUTO: 9.7 FL (ref 7.4–10.4)
PMV BLD AUTO: 9.8 FL (ref 7.4–10.4)
PO2 TEMP ADJ BLD: 28 MMHG (ref 75–100)
PO2 TEMP ADJ BLD: 43 MMHG (ref 75–100)
PO2 TEMP ADJ BLD: 43.5 MMHG (ref 75–100)
PO2 TEMP ADJ BLD: 63.7 MMHG (ref 75–100)
PO2 TEMP ADJ BLD: 89.2 MMHG (ref 75–100)
PO2 TEMP ADJ BLD: < 28 MMHG (ref 75–100)
POTASSIUM SERPL-SCNC: 3.2 MMOL/L (ref 3.5–5.1)
POTASSIUM SERPL-SCNC: 4.1 MMOL/L (ref 3.5–5.1)
PROT SERPL-MCNC: 6.1 G/DL (ref 6.4–8.2)
PROT SERPL-MCNC: 6.4 G/DL (ref 6.4–8.2)
RBC # BLD AUTO: 4.58 X10'6 (ref 4.2–5.6)
RBC # BLD AUTO: 4.73 X10'6 (ref 4.2–5.6)
RBC # BLD AUTO: 5.21 X10'6 (ref 4.2–5.6)
RESP RATE 1H: 26 B/MIN
RESP RATE 1H: 26 B/MIN
RESP RATE 1H: 30 B/MIN
RESP RATE 1H: 30 B/MIN
SAO2 % BLDA: 30.1 % (ref 94–97)
SAO2 % BLDA: 31 % (ref 94–97)
SAO2 % BLDA: 75 % (ref 94–97)
SAO2 % BLDA: 80.1 % (ref 94–97)
SAO2 % BLDA: 93.5 % (ref 94–97)
SAO2 % BLDA: 96.3 % (ref 94–97)
SODIUM SERPL-SCNC: 144 MMOL/L (ref 135–145)
SODIUM SERPL-SCNC: 146 MMOL/L (ref 135–145)
SPONT VT COMPRES GAS VOL SET UNCOR VENT: 450 ML
SPONT VT COMPRES GAS VOL SET UNCOR VENT: 550 ML
SPONT VT COMPRES GAS VOL SET UNCOR VENT: 550 ML
TROPONIN I SERPL-MCNC: 0.1 NG/ML (ref 0–0.05)
TROPONIN I SERPL-MCNC: < 0.04 NG/ML (ref 0–0.05)
WBC # BLD AUTO: 12.7 X10'3 (ref 4.5–11)
WBC # BLD AUTO: 13.9 X10'3 (ref 4.5–11)
WBC # BLD AUTO: 9.5 X10'3 (ref 4.5–11)

## 2021-10-22 PROCEDURE — 0BH17EZ INSERTION OF ENDOTRACHEAL AIRWAY INTO TRACHEA, VIA NATURAL OR ARTIFICIAL OPENING: ICD-10-PCS | Performed by: INTERNAL MEDICINE

## 2021-10-22 PROCEDURE — 5A1955Z RESPIRATORY VENTILATION, GREATER THAN 96 CONSECUTIVE HOURS: ICD-10-PCS | Performed by: INTERNAL MEDICINE

## 2021-10-22 RX ADMIN — ENOXAPARIN SODIUM SCH MG: 100 INJECTION SUBCUTANEOUS at 23:13

## 2021-10-22 RX ADMIN — SODIUM CHLORIDE, SODIUM LACTATE, POTASSIUM CHLORIDE, AND CALCIUM CHLORIDE SCH MLS/HR: .6; .31; .03; .02 INJECTION, SOLUTION INTRAVENOUS at 22:05

## 2021-10-22 RX ADMIN — SODIUM CHLORIDE SCH MLS/HR: 9 INJECTION INTRAMUSCULAR; INTRAVENOUS; SUBCUTANEOUS at 13:44

## 2021-10-22 RX ADMIN — LEVALBUTEROL SCH MG: 0.63 SOLUTION RESPIRATORY (INHALATION) at 09:00

## 2021-10-22 RX ADMIN — ENOXAPARIN SODIUM SCH MG: 100 INJECTION SUBCUTANEOUS at 12:25

## 2021-10-22 RX ADMIN — LEVALBUTEROL SCH MG: 0.63 SOLUTION RESPIRATORY (INHALATION) at 21:48

## 2021-10-22 RX ADMIN — Medication PRN MLS/HR: at 15:12

## 2021-10-22 RX ADMIN — CEFEPIME HYDROCHLORIDE SCH MLS/HR: 2 INJECTION, SOLUTION INTRAVENOUS at 10:24

## 2021-10-22 RX ADMIN — SODIUM CHLORIDE SCH MLS/HR: 9 INJECTION INTRAMUSCULAR; INTRAVENOUS; SUBCUTANEOUS at 21:23

## 2021-10-22 RX ADMIN — Medication PRN MLS/HR: at 10:00

## 2021-10-22 RX ADMIN — Medication SCH CAN: at 13:00

## 2021-10-22 RX ADMIN — INSULIN GLARGINE SCH UNIT: 100 INJECTION, SOLUTION SUBCUTANEOUS at 21:21

## 2021-10-22 RX ADMIN — Medication PRN MLS/HR: at 23:59

## 2021-10-22 RX ADMIN — Medication PRN MLS/HR: at 20:03

## 2021-10-22 RX ADMIN — ACETAMINOPHEN PRN MG: 650 SUPPOSITORY RECTAL at 14:39

## 2021-10-22 RX ADMIN — SODIUM CHLORIDE SCH MLS/HR: 9 INJECTION INTRAMUSCULAR; INTRAVENOUS; SUBCUTANEOUS at 11:13

## 2021-10-22 RX ADMIN — Medication SCH MMU: at 08:00

## 2021-10-22 RX ADMIN — Medication PRN MLS/HR: at 14:08

## 2021-10-22 RX ADMIN — ENOXAPARIN SODIUM SCH MG: 100 INJECTION SUBCUTANEOUS at 12:24

## 2021-10-22 RX ADMIN — Medication SCH CAN: at 08:00

## 2021-10-22 RX ADMIN — METHYLPREDNISOLONE SODIUM SUCCINATE SCH MG: 40 INJECTION, POWDER, FOR SOLUTION INTRAMUSCULAR; INTRAVENOUS at 12:25

## 2021-10-22 RX ADMIN — INSULIN LISPRO SCH UNITS: 100 INJECTION, SOLUTION INTRAVENOUS; SUBCUTANEOUS at 21:20

## 2021-10-22 RX ADMIN — BUDESONIDE SCH MG: 0.5 INHALANT RESPIRATORY (INHALATION) at 08:06

## 2021-10-22 RX ADMIN — Medication SCH CAN: at 18:00

## 2021-10-22 RX ADMIN — Medication SCH MG: at 08:00

## 2021-10-22 RX ADMIN — PROPOFOL SCH MLS/HR: 10 INJECTION, EMULSION INTRAVENOUS at 23:55

## 2021-10-22 RX ADMIN — LEVALBUTEROL SCH MG: 0.63 SOLUTION RESPIRATORY (INHALATION) at 01:59

## 2021-10-22 RX ADMIN — Medication PRN MLS/HR: at 11:07

## 2021-10-22 RX ADMIN — SODIUM CHLORIDE, SODIUM LACTATE, POTASSIUM CHLORIDE, AND CALCIUM CHLORIDE SCH MLS/HR: .6; .31; .03; .02 INJECTION, SOLUTION INTRAVENOUS at 02:09

## 2021-10-22 RX ADMIN — ENOXAPARIN SODIUM SCH MG: 100 INJECTION SUBCUTANEOUS at 23:11

## 2021-10-22 RX ADMIN — DOCUSATE SODIUM SCH MG: 100 CAPSULE, LIQUID FILLED ORAL at 08:00

## 2021-10-22 RX ADMIN — DOCUSATE SODIUM SCH MG: 100 CAPSULE, LIQUID FILLED ORAL at 20:00

## 2021-10-22 RX ADMIN — CEFEPIME HYDROCHLORIDE SCH MLS/HR: 2 INJECTION, SOLUTION INTRAVENOUS at 21:12

## 2021-10-22 RX ADMIN — SODIUM CHLORIDE, SODIUM LACTATE, POTASSIUM CHLORIDE, AND CALCIUM CHLORIDE SCH MLS/HR: .6; .31; .03; .02 INJECTION, SOLUTION INTRAVENOUS at 12:05

## 2021-10-22 RX ADMIN — HYDROCODONE BITARTRATE AND ACETAMINOPHEN PRN ML: 7.5; 325 SOLUTION ORAL at 00:40

## 2021-10-22 RX ADMIN — LEVALBUTEROL SCH MG: 0.63 SOLUTION RESPIRATORY (INHALATION) at 15:07

## 2021-10-22 RX ADMIN — SODIUM CHLORIDE SCH MLS/HR: 9 INJECTION INTRAMUSCULAR; INTRAVENOUS; SUBCUTANEOUS at 06:05

## 2021-10-22 RX ADMIN — BUDESONIDE SCH MG: 0.5 INHALANT RESPIRATORY (INHALATION) at 21:48

## 2021-10-22 RX ADMIN — Medication PRN MLS/HR: at 09:59

## 2021-10-22 NOTE — NUR
Assumed care of pt at 0600. pt sat was 48% on 100% with peep of 15.  pt's sedation ran thru 
the left ac 20g which was positional and would not run, sedation moved to right ac. sedation 
having good effect.  noted pt to be hypotensive with bp in the 50-60's systolic. On call doc 
notified and orders received.  Call placed to Day intensivist who responded. left IJ quad 
lumen CL and Right radial art placed. Levophed started. md, rt, and charge rn at bedside pt 
proned.  pulse ox slowly increased currently sat 80's.

## 2021-10-22 NOTE — NUR
Rapid response called about 0045 for "panic attack" and SOB; unable to respond to rapid d/t 
emergency in ICU; Nursing supervisor, Sharon responded, as well as hospitalist Dr. Sapp; 
apparently, pt OOB to use bathroom, took O2 off and became anxious and SOB; per nursg 
supervisor, pt assisted back to bed; ABG and CXR done; pt initally with low PaO2 on ABG and 
improved ABG one hr later after pt back on cpap and less anxious; labs reviewed w/Dr. Sapp 
and received more orders for patient, including alexandra.  Spoke w/Dr. Vilisaint re: possible 
trsf to ICU but at present, telemedicine wanting pt to remain on ortho and if condition 
changes, will re-evaluate for possible transfer.  Update received from Poly, charge nurse.  
Pt stable at present.

## 2021-10-22 NOTE — NUR
Patient in room CICU 2009. I have received report from  Esau BARONE  and had the opportunity to 
ask questions and assume patient care.

## 2021-10-22 NOTE — NUR
Late note, during the shift patient had pulled her mask off to take a drink of water. She 
also was noted to be pulling at her mask multiple times throughout the shift while nurse, 
Brea RN  was by her bedside helping her through her panic attacks that she had during 
the night. She apparently didn't like the noise of the machine and was pulling at the mask 
and trying to take it off and adjust it while Brea was in the room at times. This if for 
the night of 10/21/21 Thursday night.

## 2021-10-22 NOTE — NUR
Problems reprioritized. Patient report given, questions answered & plan of care reviewed 
with ZanderRN.

## 2021-10-22 NOTE — NUR
RN Note -Pt arrived from Covid unit post respiratory pulse. Intubated. Made several 
unsuccessful attempts to place OG/NG.

## 2021-10-22 NOTE — NUR
Code blue called about 0500 on patient; upon arrival, staff stated that "patient had bipap 
on and just passed out".  Resp therapist, Beckie and Dav stated that filter on bipap 
connected incorrectly, it was connected to the exhalation port. Pt intubated by Dr. Peacock w/o problems on ortho then transferred to 2009.  Dr. Vazquez, telemedicine 
updated. CXR done and tube placement verified.

## 2021-10-22 NOTE — NUR
Reassessment: Pt s/p code blue resulting in intubation this morning. OG/NG tube placement 
was unsuccessful per RN documentation. TF recommendations below for if access is achieved 
and prolonged intubation is expected. Estimated nutrient needs were calculated using IBW 
+10% as current documented wt isn't scaled. Of note pt documented to be receiving Propofol 
at 40 mL/hr which will provide 1056 kcal/day. TF recommendations calculated with at this 
Propofol rate will NOT meet patient's estimated protein needs. Additional TF recommendations 
below for if Propofol is weaned. LBM 10/19, receiving routine bowel care with additional PRN 
bowel care available. Will continue to follow closely.

Recommendations:

1) IF TF with Propofol at 40 mL/hr, continuous Vital High Protein with 23 mL/hr goal to 
provide 552 mL total volume/day, 552 kcal, 48 g protein, and 461 mL water. Will NOT meet 
estimated protein needs but will meet estimated energy needs combined with kcal from 
Propofol

2) IF TF and Propofol weaned off, continuous Vital High Protein with 65 mL/hr goal rate to 
provide 1560 mL total volume/day, 1560 kcal, 137  protein, and 1304 mL water

3) IF TF, additional 100 mL water flush Q4H; monitor serum Na and adjust as appropriate

4) IF TF, prealbumin q Monday/Thursday; daily scaled weights

5) Routine bowel care

-------------------------------------------------------------------------------

Addendum: 10/22/21 at 1204 by Carmela Walsh RD

-------------------------------------------------------------------------------

Amended: Links added.

## 2021-10-22 NOTE — NUR
I went to check on the pt.and the pt. Sat was 70 and she was dropping to a 40,I called for 
help .She had pulse.My charge nurse call Code Blue.All the code blue respond came and pt. 
got intubated .I called her sister and left a massage

## 2021-10-22 NOTE — NUR
I was called by PCT regarding my pt. condition.Pt. found low on o2 sat 55.Rapid respond was 
page regarding pt.situation.Nursing supp. was present ,RT and charge nurse.Chest X ray was 
order by Nursing supp .Dr. Sapp was notified ,he order ABG, Lactic acid,Troponin,LR bolus 
,EKG .We will continue with pt care.

## 2021-10-23 VITALS — DIASTOLIC BLOOD PRESSURE: 69 MMHG | SYSTOLIC BLOOD PRESSURE: 121 MMHG

## 2021-10-23 VITALS — DIASTOLIC BLOOD PRESSURE: 68 MMHG | SYSTOLIC BLOOD PRESSURE: 121 MMHG

## 2021-10-23 VITALS — SYSTOLIC BLOOD PRESSURE: 108 MMHG | DIASTOLIC BLOOD PRESSURE: 66 MMHG

## 2021-10-23 VITALS — SYSTOLIC BLOOD PRESSURE: 135 MMHG | DIASTOLIC BLOOD PRESSURE: 71 MMHG

## 2021-10-23 VITALS — DIASTOLIC BLOOD PRESSURE: 68 MMHG | SYSTOLIC BLOOD PRESSURE: 127 MMHG

## 2021-10-23 VITALS — SYSTOLIC BLOOD PRESSURE: 123 MMHG | DIASTOLIC BLOOD PRESSURE: 69 MMHG

## 2021-10-23 VITALS — DIASTOLIC BLOOD PRESSURE: 71 MMHG | SYSTOLIC BLOOD PRESSURE: 128 MMHG

## 2021-10-23 VITALS — DIASTOLIC BLOOD PRESSURE: 70 MMHG | SYSTOLIC BLOOD PRESSURE: 120 MMHG

## 2021-10-23 VITALS — DIASTOLIC BLOOD PRESSURE: 68 MMHG | SYSTOLIC BLOOD PRESSURE: 114 MMHG

## 2021-10-23 VITALS — DIASTOLIC BLOOD PRESSURE: 69 MMHG | SYSTOLIC BLOOD PRESSURE: 122 MMHG

## 2021-10-23 VITALS — DIASTOLIC BLOOD PRESSURE: 59 MMHG | SYSTOLIC BLOOD PRESSURE: 97 MMHG

## 2021-10-23 VITALS — DIASTOLIC BLOOD PRESSURE: 72 MMHG | SYSTOLIC BLOOD PRESSURE: 139 MMHG

## 2021-10-23 VITALS — DIASTOLIC BLOOD PRESSURE: 68 MMHG | SYSTOLIC BLOOD PRESSURE: 125 MMHG

## 2021-10-23 VITALS — SYSTOLIC BLOOD PRESSURE: 142 MMHG | DIASTOLIC BLOOD PRESSURE: 6 MMHG

## 2021-10-23 VITALS — SYSTOLIC BLOOD PRESSURE: 122 MMHG | DIASTOLIC BLOOD PRESSURE: 69 MMHG

## 2021-10-23 VITALS — DIASTOLIC BLOOD PRESSURE: 76 MMHG | SYSTOLIC BLOOD PRESSURE: 125 MMHG

## 2021-10-23 VITALS — DIASTOLIC BLOOD PRESSURE: 75 MMHG | SYSTOLIC BLOOD PRESSURE: 136 MMHG

## 2021-10-23 VITALS — SYSTOLIC BLOOD PRESSURE: 109 MMHG | DIASTOLIC BLOOD PRESSURE: 66 MMHG

## 2021-10-23 VITALS — DIASTOLIC BLOOD PRESSURE: 75 MMHG | SYSTOLIC BLOOD PRESSURE: 137 MMHG

## 2021-10-23 VITALS — DIASTOLIC BLOOD PRESSURE: 77 MMHG | SYSTOLIC BLOOD PRESSURE: 144 MMHG

## 2021-10-23 VITALS — DIASTOLIC BLOOD PRESSURE: 70 MMHG | SYSTOLIC BLOOD PRESSURE: 127 MMHG

## 2021-10-23 VITALS — DIASTOLIC BLOOD PRESSURE: 66 MMHG | SYSTOLIC BLOOD PRESSURE: 118 MMHG

## 2021-10-23 LAB
ALBUMIN SERPL BCP-MCNC: 2.3 G/DL (ref 3.4–5)
ALBUMIN/GLOB SERPL: 0.6 {RATIO} (ref 1.1–1.5)
ALP SERPL-CCNC: 86 IU/L (ref 46–116)
ALT SERPL W P-5'-P-CCNC: 89 U/L (ref 12–78)
ANION GAP SERPL CALCULATED.3IONS-SCNC: 7 MMOL/L (ref 8–16)
APTT PPP: 28 SECONDS (ref 22–32)
AST SERPL W P-5'-P-CCNC: 48 U/L (ref 10–37)
BASE EXCESS BLDA CALC-SCNC: 6.4 MMOL/L (ref -2–2)
BASOPHILS # BLD AUTO: 0.1 X10'3 (ref 0–0.2)
BASOPHILS NFR BLD AUTO: 0.5 % (ref 0–1)
BILIRUB SERPL-MCNC: 0.5 MG/DL (ref 0.1–1)
BODY TEMPERATURE: 38.4
BUN SERPL-MCNC: 15 MG/DL (ref 7–18)
BUN/CREAT SERPL: 18.3 (ref 6.6–38)
CALCIUM SERPL-MCNC: 7.7 MG/DL (ref 8.5–10.1)
CHLORIDE SERPL-SCNC: 108 MMOL/L (ref 99–107)
CO2 SERPL-SCNC: 29.9 MMOL/L (ref 24–32)
COHGB MFR BLDA: 1 % (ref 0–3.9)
CREAT SERPL-MCNC: 0.82 MG/DL (ref 0.4–0.9)
CRP SERPL HS-MCNC: 3.96 MG/DL (ref 0–0.5)
D DIMER PPP FEU-MCNC: 2.97 MG/L FEU (ref 0–0.5)
EOSINOPHIL # BLD AUTO: 0 X10'3 (ref 0–0.9)
EOSINOPHIL NFR BLD AUTO: 0.4 % (ref 0–6)
ERYTHROCYTE [DISTWIDTH] IN BLOOD BY AUTOMATED COUNT: 14.3 % (ref 11.5–14.5)
GFR SERPL CREATININE-BSD FRML MDRD: 72 ML/MIN
GLUCOSE SERPL-MCNC: 133 MG/DL (ref 70–104)
HCO3 BLDA-SCNC: 28.1 MMOL/L (ref 22–26)
HCT VFR BLD AUTO: 39.3 % (ref 35–45)
HGB BLD-MCNC: 13.4 G/DL (ref 12–16)
HGB BLDA-MCNC: 13.7 G/DL (ref 12–16)
LDH SERPL-CCNC: 751 U/L (ref 81–234)
LYMPHOCYTES # BLD AUTO: 0.9 X10'3 (ref 1.1–4.8)
LYMPHOCYTES NFR BLD AUTO: 8.8 % (ref 21–51)
MAGNESIUM SERPL-MCNC: 2.1 MG/DL (ref 1.5–2.4)
MCH RBC QN AUTO: 30.3 PG (ref 27–31)
MCHC RBC AUTO-ENTMCNC: 34 G/DL (ref 33–36.5)
MCV RBC AUTO: 89.2 FL (ref 78–98)
METHGB MFR BLDA: 0.3 % (ref 0–1.5)
MONOCYTES # BLD AUTO: 0.3 X10'3 (ref 0–0.9)
MONOCYTES NFR BLD AUTO: 3.2 % (ref 2–12)
NEUTROPHILS # BLD AUTO: 9.1 X10'3 (ref 1.8–7.7)
NEUTROPHILS NFR BLD AUTO: 87.1 % (ref 42–75)
O2/TOTAL GAS SETTING VFR VENT: 80 MMHG/%
OXYHGB MFR BLDA: 92.7 % (ref 94–97)
PCO2 TEMP ADJ BLDA: 33.7 MMHG (ref 32–45)
PEEP RESPIRATORY: 12 CM H2O
PHOSPHATE SERPL-MCNC: 2.7 MG/DL (ref 2.3–4.5)
PLATELET # BLD AUTO: 213 X10'3 (ref 140–440)
PMV BLD AUTO: 9.4 FL (ref 7.4–10.4)
PO2 TEMP ADJ BLD: 70.1 MMHG (ref 75–100)
POTASSIUM SERPL-SCNC: 3.7 MMOL/L (ref 3.5–5.1)
PROT SERPL-MCNC: 6.1 G/DL (ref 6.4–8.2)
RBC # BLD AUTO: 4.41 X10'6 (ref 4.2–5.6)
RESP RATE 1H: 24 B/MIN
SAO2 % BLDA: 93.9 % (ref 94–97)
SODIUM SERPL-SCNC: 145 MMOL/L (ref 135–145)
SPONT VT COMPRES GAS VOL SET UNCOR VENT: 550 ML
TRIGL SERPL-MCNC: 264 MG/DL (ref 20–135)
VANCOMYCIN SERPL-MCNC: 6.6 UG/ML
WBC # BLD AUTO: 10.4 X10'3 (ref 4.5–11)

## 2021-10-23 RX ADMIN — Medication PRN MLS/HR: at 13:51

## 2021-10-23 RX ADMIN — BUDESONIDE SCH MG: 0.5 INHALANT RESPIRATORY (INHALATION) at 08:58

## 2021-10-23 RX ADMIN — Medication SCH MLS/HR: at 04:38

## 2021-10-23 RX ADMIN — Medication PRN MLS/HR: at 08:15

## 2021-10-23 RX ADMIN — Medication SCH CAN: at 16:36

## 2021-10-23 RX ADMIN — CEFEPIME HYDROCHLORIDE SCH MLS/HR: 2 INJECTION, SOLUTION INTRAVENOUS at 20:47

## 2021-10-23 RX ADMIN — Medication SCH MLS/HR: at 15:25

## 2021-10-23 RX ADMIN — ENOXAPARIN SODIUM SCH MG: 100 INJECTION SUBCUTANEOUS at 20:48

## 2021-10-23 RX ADMIN — SODIUM CHLORIDE SCH MLS/HR: 9 INJECTION INTRAMUSCULAR; INTRAVENOUS; SUBCUTANEOUS at 07:29

## 2021-10-23 RX ADMIN — DOCUSATE SODIUM SCH MG: 100 CAPSULE, LIQUID FILLED ORAL at 07:34

## 2021-10-23 RX ADMIN — PROPOFOL SCH MLS/HR: 10 INJECTION, EMULSION INTRAVENOUS at 12:26

## 2021-10-23 RX ADMIN — SODIUM CHLORIDE SCH MLS/HR: 9 INJECTION INTRAMUSCULAR; INTRAVENOUS; SUBCUTANEOUS at 12:17

## 2021-10-23 RX ADMIN — Medication PRN MLS/HR: at 20:57

## 2021-10-23 RX ADMIN — METHYLPREDNISOLONE SODIUM SUCCINATE SCH MG: 40 INJECTION, POWDER, FOR SOLUTION INTRAMUSCULAR; INTRAVENOUS at 07:27

## 2021-10-23 RX ADMIN — PROPOFOL SCH MLS/HR: 10 INJECTION, EMULSION INTRAVENOUS at 20:58

## 2021-10-23 RX ADMIN — DOCUSATE SODIUM SCH MG: 100 CAPSULE, LIQUID FILLED ORAL at 20:00

## 2021-10-23 RX ADMIN — SODIUM CHLORIDE, SODIUM LACTATE, POTASSIUM CHLORIDE, AND CALCIUM CHLORIDE SCH MLS/HR: .6; .31; .03; .02 INJECTION, SOLUTION INTRAVENOUS at 16:36

## 2021-10-23 RX ADMIN — LEVALBUTEROL SCH MG: 0.63 SOLUTION RESPIRATORY (INHALATION) at 16:44

## 2021-10-23 RX ADMIN — SODIUM CHLORIDE, SODIUM LACTATE, POTASSIUM CHLORIDE, AND CALCIUM CHLORIDE SCH MLS/HR: .6; .31; .03; .02 INJECTION, SOLUTION INTRAVENOUS at 08:05

## 2021-10-23 RX ADMIN — LEVALBUTEROL SCH MG: 0.63 SOLUTION RESPIRATORY (INHALATION) at 21:28

## 2021-10-23 RX ADMIN — Medication SCH CAN: at 12:17

## 2021-10-23 RX ADMIN — PROPOFOL SCH MLS/HR: 10 INJECTION, EMULSION INTRAVENOUS at 16:35

## 2021-10-23 RX ADMIN — INSULIN GLARGINE SCH UNIT: 100 INJECTION, SOLUTION SUBCUTANEOUS at 21:36

## 2021-10-23 RX ADMIN — LEVALBUTEROL SCH MG: 0.63 SOLUTION RESPIRATORY (INHALATION) at 08:58

## 2021-10-23 RX ADMIN — ENOXAPARIN SODIUM SCH MG: 100 INJECTION SUBCUTANEOUS at 07:28

## 2021-10-23 RX ADMIN — Medication SCH MLS/HR: at 00:02

## 2021-10-23 RX ADMIN — CEFEPIME HYDROCHLORIDE SCH MLS/HR: 2 INJECTION, SOLUTION INTRAVENOUS at 07:27

## 2021-10-23 RX ADMIN — LEVALBUTEROL SCH MG: 0.63 SOLUTION RESPIRATORY (INHALATION) at 03:19

## 2021-10-23 RX ADMIN — BUDESONIDE SCH MG: 0.5 INHALANT RESPIRATORY (INHALATION) at 21:28

## 2021-10-23 RX ADMIN — Medication PRN MLS/HR: at 03:59

## 2021-10-23 RX ADMIN — SODIUM CHLORIDE SCH MLS/HR: 9 INJECTION INTRAMUSCULAR; INTRAVENOUS; SUBCUTANEOUS at 05:02

## 2021-10-23 RX ADMIN — Medication SCH CAN: at 07:34

## 2021-10-23 RX ADMIN — Medication SCH MLS/HR: at 21:43

## 2021-10-23 RX ADMIN — Medication PRN MLS/HR: at 00:00

## 2021-10-23 RX ADMIN — SODIUM CHLORIDE SCH MLS/HR: 9 INJECTION INTRAMUSCULAR; INTRAVENOUS; SUBCUTANEOUS at 20:20

## 2021-10-23 RX ADMIN — INSULIN LISPRO SCH UNITS: 100 INJECTION, SOLUTION INTRAVENOUS; SUBCUTANEOUS at 16:17

## 2021-10-23 RX ADMIN — Medication SCH MG: at 07:34

## 2021-10-23 NOTE — NUR
Noted pt with a low Noah of 12. No edema or wounds per physical assessment. Noted pt with 
A1c 6.8% with no PMH of DM in EMR, will d/w MD. Pt will need official DM dx by physician 
prior to RD being able to provide pt with DM education if this is a new diagnosis. Pt 
remains on the ventilator at this time. Will continue to follow closely.

-------------------------------------------------------------------------------

Addendum: 10/23/21 at 0943 by Carmela Walsh RD

-------------------------------------------------------------------------------

Amended: Links added.

## 2021-10-24 VITALS — DIASTOLIC BLOOD PRESSURE: 63 MMHG | SYSTOLIC BLOOD PRESSURE: 121 MMHG

## 2021-10-24 VITALS — SYSTOLIC BLOOD PRESSURE: 142 MMHG | DIASTOLIC BLOOD PRESSURE: 72 MMHG

## 2021-10-24 VITALS — DIASTOLIC BLOOD PRESSURE: 59 MMHG | SYSTOLIC BLOOD PRESSURE: 131 MMHG

## 2021-10-24 VITALS — SYSTOLIC BLOOD PRESSURE: 120 MMHG | DIASTOLIC BLOOD PRESSURE: 62 MMHG

## 2021-10-24 VITALS — SYSTOLIC BLOOD PRESSURE: 118 MMHG | DIASTOLIC BLOOD PRESSURE: 60 MMHG

## 2021-10-24 VITALS — DIASTOLIC BLOOD PRESSURE: 52 MMHG | SYSTOLIC BLOOD PRESSURE: 100 MMHG

## 2021-10-24 VITALS — DIASTOLIC BLOOD PRESSURE: 66 MMHG | SYSTOLIC BLOOD PRESSURE: 126 MMHG

## 2021-10-24 VITALS — SYSTOLIC BLOOD PRESSURE: 131 MMHG | DIASTOLIC BLOOD PRESSURE: 60 MMHG

## 2021-10-24 VITALS — DIASTOLIC BLOOD PRESSURE: 58 MMHG | SYSTOLIC BLOOD PRESSURE: 122 MMHG

## 2021-10-24 VITALS — SYSTOLIC BLOOD PRESSURE: 108 MMHG | DIASTOLIC BLOOD PRESSURE: 50 MMHG

## 2021-10-24 VITALS — SYSTOLIC BLOOD PRESSURE: 121 MMHG | DIASTOLIC BLOOD PRESSURE: 58 MMHG

## 2021-10-24 VITALS — SYSTOLIC BLOOD PRESSURE: 129 MMHG | DIASTOLIC BLOOD PRESSURE: 58 MMHG

## 2021-10-24 VITALS — DIASTOLIC BLOOD PRESSURE: 61 MMHG | SYSTOLIC BLOOD PRESSURE: 115 MMHG

## 2021-10-24 VITALS — SYSTOLIC BLOOD PRESSURE: 121 MMHG | DIASTOLIC BLOOD PRESSURE: 63 MMHG

## 2021-10-24 VITALS — SYSTOLIC BLOOD PRESSURE: 118 MMHG | DIASTOLIC BLOOD PRESSURE: 62 MMHG

## 2021-10-24 VITALS — DIASTOLIC BLOOD PRESSURE: 63 MMHG | SYSTOLIC BLOOD PRESSURE: 117 MMHG

## 2021-10-24 VITALS — SYSTOLIC BLOOD PRESSURE: 128 MMHG | DIASTOLIC BLOOD PRESSURE: 59 MMHG

## 2021-10-24 VITALS — DIASTOLIC BLOOD PRESSURE: 60 MMHG | SYSTOLIC BLOOD PRESSURE: 128 MMHG

## 2021-10-24 VITALS — SYSTOLIC BLOOD PRESSURE: 123 MMHG | DIASTOLIC BLOOD PRESSURE: 63 MMHG

## 2021-10-24 VITALS — SYSTOLIC BLOOD PRESSURE: 149 MMHG | DIASTOLIC BLOOD PRESSURE: 73 MMHG

## 2021-10-24 VITALS — DIASTOLIC BLOOD PRESSURE: 63 MMHG | SYSTOLIC BLOOD PRESSURE: 127 MMHG

## 2021-10-24 VITALS — DIASTOLIC BLOOD PRESSURE: 57 MMHG | SYSTOLIC BLOOD PRESSURE: 120 MMHG

## 2021-10-24 VITALS — DIASTOLIC BLOOD PRESSURE: 60 MMHG | SYSTOLIC BLOOD PRESSURE: 133 MMHG

## 2021-10-24 LAB
ALBUMIN SERPL BCP-MCNC: 2.2 G/DL (ref 3.4–5)
ALBUMIN/GLOB SERPL: 0.6 {RATIO} (ref 1.1–1.5)
ALP SERPL-CCNC: 74 IU/L (ref 46–116)
ALT SERPL W P-5'-P-CCNC: 69 U/L (ref 12–78)
ANION GAP SERPL CALCULATED.3IONS-SCNC: 9 MMOL/L (ref 8–16)
AST SERPL W P-5'-P-CCNC: 29 U/L (ref 10–37)
BASE EXCESS BLDA CALC-SCNC: 3.7 MMOL/L (ref -2–2)
BASOPHILS # BLD AUTO: 0 X10'3 (ref 0–0.2)
BASOPHILS NFR BLD AUTO: 0.3 % (ref 0–1)
BASOPHILS NFR BLD MANUAL: 1 % (ref 0–1)
BILIRUB SERPL-MCNC: 0.4 MG/DL (ref 0.1–1)
BODY TEMPERATURE: 38
BUN SERPL-MCNC: 13 MG/DL (ref 7–18)
BUN/CREAT SERPL: 17.1 (ref 6.6–38)
CALCIUM SERPL-MCNC: 8 MG/DL (ref 8.5–10.1)
CHLORIDE SERPL-SCNC: 111 MMOL/L (ref 99–107)
CO2 SERPL-SCNC: 24.6 MMOL/L (ref 24–32)
COHGB MFR BLDA: 0.8 % (ref 0–3.9)
CREAT SERPL-MCNC: 0.76 MG/DL (ref 0.4–0.9)
CRP SERPL HS-MCNC: 1.86 MG/DL (ref 0–0.5)
D DIMER PPP FEU-MCNC: 1.7 MG/L FEU (ref 0–0.5)
EOSINOPHIL # BLD AUTO: 0.1 X10'3 (ref 0–0.9)
EOSINOPHIL NFR BLD AUTO: 0.6 % (ref 0–6)
ERYTHROCYTE [DISTWIDTH] IN BLOOD BY AUTOMATED COUNT: 14.2 % (ref 11.5–14.5)
GFR SERPL CREATININE-BSD FRML MDRD: 79 ML/MIN
GLUCOSE SERPL-MCNC: 120 MG/DL (ref 70–104)
HCO3 BLDA-SCNC: 26 MMOL/L (ref 22–26)
HCT VFR BLD AUTO: 35.2 % (ref 35–45)
HGB BLD-MCNC: 11.8 G/DL (ref 12–16)
HGB BLDA-MCNC: 12.4 G/DL (ref 12–16)
LDH SERPL-CCNC: 593 U/L (ref 81–234)
LYMPHOCYTES # BLD AUTO: 0.8 X10'3 (ref 1.1–4.8)
LYMPHOCYTES NFR BLD AUTO: 7.8 % (ref 21–51)
LYMPHOCYTES NFR BLD MANUAL: 7 % (ref 21–51)
MAGNESIUM SERPL-MCNC: 2.1 MG/DL (ref 1.5–2.4)
MCH RBC QN AUTO: 30.1 PG (ref 27–31)
MCHC RBC AUTO-ENTMCNC: 33.5 G/DL (ref 33–36.5)
MCV RBC AUTO: 89.9 FL (ref 78–98)
METAMYELOCYTES NFR BLD MANUAL: 2 % (ref 0–0)
METHGB MFR BLDA: 0.1 % (ref 0–1.5)
MONOCYTES # BLD AUTO: 0.4 X10'3 (ref 0–0.9)
MONOCYTES NFR BLD AUTO: 4.2 % (ref 2–12)
MONOCYTES NFR BLD MANUAL: 4 % (ref 2–12)
NEUTROPHILS # BLD AUTO: 9.1 X10'3 (ref 1.8–7.7)
NEUTROPHILS NFR BLD AUTO: 87.1 % (ref 42–75)
NEUTS BAND # BLD MANUAL: 15 % (ref 0–10)
NEUTS SEG NFR BLD MANUAL: 70 % (ref 42–75)
O2/TOTAL GAS SETTING VFR VENT: 90 MMHG/%
OXYHGB MFR BLDA: 93.2 % (ref 94–97)
PCO2 TEMP ADJ BLDA: 33.1 MMHG (ref 32–45)
PEEP RESPIRATORY: 12 CM H2O
PHOSPHATE SERPL-MCNC: 2.8 MG/DL (ref 2.3–4.5)
PLATELET # BLD AUTO: 197 X10'3 (ref 140–440)
PLATELET BLD QL SMEAR: NORMAL
PMV BLD AUTO: 9.4 FL (ref 7.4–10.4)
PO2 TEMP ADJ BLD: 71.7 MMHG (ref 75–100)
POLYCHROMASIA BLD QL SMEAR: (no result)
POTASSIUM SERPL-SCNC: 3.6 MMOL/L (ref 3.5–5.1)
PROT SERPL-MCNC: 5.8 G/DL (ref 6.4–8.2)
RBC # BLD AUTO: 3.91 X10'6 (ref 4.2–5.6)
RBC MORPH BLD: (no result)
RESP RATE 1H: 24 B/MIN
SAO2 % BLDA: 94 % (ref 94–97)
SMUDGE CELLS BLD QL SMEAR: (no result)
SODIUM SERPL-SCNC: 145 MMOL/L (ref 135–145)
SPONT VT COMPRES GAS VOL SET UNCOR VENT: 550 ML
STOMATOCYTES BLD QL SMEAR: (no result)
TOTAL CELLS COUNTED FLD: 100
VARIANT LYMPHS NFR BLD MANUAL: 1 % (ref 0–0)
WBC # BLD AUTO: 10.4 X10'3 (ref 4.5–11)

## 2021-10-24 RX ADMIN — Medication PRN MLS/HR: at 01:23

## 2021-10-24 RX ADMIN — CEFEPIME HYDROCHLORIDE SCH MLS/HR: 2 INJECTION, SOLUTION INTRAVENOUS at 20:12

## 2021-10-24 RX ADMIN — Medication SCH MLS/HR: at 10:15

## 2021-10-24 RX ADMIN — LEVALBUTEROL SCH MG: 0.63 SOLUTION RESPIRATORY (INHALATION) at 02:51

## 2021-10-24 RX ADMIN — PROPOFOL SCH MLS/HR: 10 INJECTION, EMULSION INTRAVENOUS at 16:42

## 2021-10-24 RX ADMIN — ENOXAPARIN SODIUM SCH MG: 100 INJECTION SUBCUTANEOUS at 07:34

## 2021-10-24 RX ADMIN — INSULIN GLARGINE SCH UNIT: 100 INJECTION, SOLUTION SUBCUTANEOUS at 21:12

## 2021-10-24 RX ADMIN — SODIUM CHLORIDE, SODIUM LACTATE, POTASSIUM CHLORIDE, AND CALCIUM CHLORIDE SCH MLS/HR: .6; .31; .03; .02 INJECTION, SOLUTION INTRAVENOUS at 14:05

## 2021-10-24 RX ADMIN — SODIUM CHLORIDE SCH MLS/HR: 9 INJECTION INTRAMUSCULAR; INTRAVENOUS; SUBCUTANEOUS at 03:59

## 2021-10-24 RX ADMIN — VANCOMYCIN HYDROCHLORIDE SCH MLS/HR: 750 INJECTION, POWDER, LYOPHILIZED, FOR SOLUTION INTRAVENOUS at 00:11

## 2021-10-24 RX ADMIN — LEVALBUTEROL SCH MG: 0.63 SOLUTION RESPIRATORY (INHALATION) at 08:21

## 2021-10-24 RX ADMIN — Medication SCH CAN: at 08:00

## 2021-10-24 RX ADMIN — METHYLPREDNISOLONE SODIUM SUCCINATE SCH MG: 40 INJECTION, POWDER, FOR SOLUTION INTRAMUSCULAR; INTRAVENOUS at 07:33

## 2021-10-24 RX ADMIN — Medication PRN MLS/HR: at 12:43

## 2021-10-24 RX ADMIN — CEFEPIME HYDROCHLORIDE SCH MLS/HR: 2 INJECTION, SOLUTION INTRAVENOUS at 09:12

## 2021-10-24 RX ADMIN — ENOXAPARIN SODIUM SCH MG: 100 INJECTION SUBCUTANEOUS at 20:12

## 2021-10-24 RX ADMIN — Medication PRN MLS/HR: at 07:08

## 2021-10-24 RX ADMIN — Medication SCH CAN: at 18:00

## 2021-10-24 RX ADMIN — Medication SCH MLS/HR: at 20:13

## 2021-10-24 RX ADMIN — VANCOMYCIN HYDROCHLORIDE SCH MLS/HR: 750 INJECTION, POWDER, LYOPHILIZED, FOR SOLUTION INTRAVENOUS at 19:00

## 2021-10-24 RX ADMIN — LEVALBUTEROL SCH MG: 0.63 SOLUTION RESPIRATORY (INHALATION) at 16:19

## 2021-10-24 RX ADMIN — PROPOFOL SCH MLS/HR: 10 INJECTION, EMULSION INTRAVENOUS at 10:11

## 2021-10-24 RX ADMIN — VANCOMYCIN HYDROCHLORIDE SCH MLS/HR: 750 INJECTION, POWDER, LYOPHILIZED, FOR SOLUTION INTRAVENOUS at 07:33

## 2021-10-24 RX ADMIN — Medication SCH CAN: at 13:00

## 2021-10-24 RX ADMIN — LEVALBUTEROL SCH MG: 0.63 SOLUTION RESPIRATORY (INHALATION) at 20:49

## 2021-10-24 RX ADMIN — Medication PRN MLS/HR: at 07:40

## 2021-10-24 RX ADMIN — BUDESONIDE SCH MG: 0.5 INHALANT RESPIRATORY (INHALATION) at 20:49

## 2021-10-24 RX ADMIN — DOCUSATE SODIUM SCH MG: 50 LIQUID ORAL at 20:00

## 2021-10-24 RX ADMIN — SODIUM CHLORIDE SCH MLS/HR: 9 INJECTION INTRAMUSCULAR; INTRAVENOUS; SUBCUTANEOUS at 10:10

## 2021-10-24 RX ADMIN — PROPOFOL SCH MLS/HR: 10 INJECTION, EMULSION INTRAVENOUS at 01:29

## 2021-10-24 RX ADMIN — INSULIN LISPRO SCH UNITS: 100 INJECTION, SOLUTION INTRAVENOUS; SUBCUTANEOUS at 21:10

## 2021-10-24 RX ADMIN — BUDESONIDE SCH MG: 0.5 INHALANT RESPIRATORY (INHALATION) at 08:21

## 2021-10-24 RX ADMIN — SODIUM CHLORIDE, SODIUM LACTATE, POTASSIUM CHLORIDE, AND CALCIUM CHLORIDE SCH MLS/HR: .6; .31; .03; .02 INJECTION, SOLUTION INTRAVENOUS at 04:05

## 2021-10-24 RX ADMIN — Medication SCH MG: at 07:33

## 2021-10-24 NOTE — NUR
Patient in room CICU 2009. I have received report from   AbdiRN / ABISAI Hollis and had the 
opportunity to ask questions and assume patient care.

## 2021-10-24 NOTE — NUR
Problems reprioritized. Patient report given, questions answered & plan of care reviewed 
with ABISAI Grewal.

## 2021-10-25 VITALS — SYSTOLIC BLOOD PRESSURE: 106 MMHG | DIASTOLIC BLOOD PRESSURE: 53 MMHG

## 2021-10-25 VITALS — SYSTOLIC BLOOD PRESSURE: 101 MMHG | DIASTOLIC BLOOD PRESSURE: 59 MMHG

## 2021-10-25 VITALS — SYSTOLIC BLOOD PRESSURE: 99 MMHG | DIASTOLIC BLOOD PRESSURE: 53 MMHG

## 2021-10-25 VITALS — SYSTOLIC BLOOD PRESSURE: 107 MMHG | DIASTOLIC BLOOD PRESSURE: 57 MMHG

## 2021-10-25 VITALS — SYSTOLIC BLOOD PRESSURE: 110 MMHG | DIASTOLIC BLOOD PRESSURE: 58 MMHG

## 2021-10-25 VITALS — SYSTOLIC BLOOD PRESSURE: 107 MMHG | DIASTOLIC BLOOD PRESSURE: 59 MMHG

## 2021-10-25 VITALS — DIASTOLIC BLOOD PRESSURE: 63 MMHG | SYSTOLIC BLOOD PRESSURE: 120 MMHG

## 2021-10-25 VITALS — SYSTOLIC BLOOD PRESSURE: 113 MMHG | DIASTOLIC BLOOD PRESSURE: 62 MMHG

## 2021-10-25 VITALS — SYSTOLIC BLOOD PRESSURE: 120 MMHG | DIASTOLIC BLOOD PRESSURE: 63 MMHG

## 2021-10-25 VITALS — DIASTOLIC BLOOD PRESSURE: 56 MMHG | SYSTOLIC BLOOD PRESSURE: 102 MMHG

## 2021-10-25 VITALS — DIASTOLIC BLOOD PRESSURE: 52 MMHG | SYSTOLIC BLOOD PRESSURE: 92 MMHG

## 2021-10-25 VITALS — DIASTOLIC BLOOD PRESSURE: 51 MMHG | SYSTOLIC BLOOD PRESSURE: 90 MMHG

## 2021-10-25 VITALS — SYSTOLIC BLOOD PRESSURE: 104 MMHG | DIASTOLIC BLOOD PRESSURE: 54 MMHG

## 2021-10-25 VITALS — DIASTOLIC BLOOD PRESSURE: 53 MMHG | SYSTOLIC BLOOD PRESSURE: 100 MMHG

## 2021-10-25 VITALS — DIASTOLIC BLOOD PRESSURE: 52 MMHG | SYSTOLIC BLOOD PRESSURE: 103 MMHG

## 2021-10-25 VITALS — DIASTOLIC BLOOD PRESSURE: 56 MMHG | SYSTOLIC BLOOD PRESSURE: 106 MMHG

## 2021-10-25 VITALS — DIASTOLIC BLOOD PRESSURE: 59 MMHG | SYSTOLIC BLOOD PRESSURE: 104 MMHG

## 2021-10-25 VITALS — DIASTOLIC BLOOD PRESSURE: 53 MMHG | SYSTOLIC BLOOD PRESSURE: 106 MMHG

## 2021-10-25 VITALS — SYSTOLIC BLOOD PRESSURE: 92 MMHG | DIASTOLIC BLOOD PRESSURE: 51 MMHG

## 2021-10-25 VITALS — DIASTOLIC BLOOD PRESSURE: 60 MMHG | SYSTOLIC BLOOD PRESSURE: 108 MMHG

## 2021-10-25 VITALS — DIASTOLIC BLOOD PRESSURE: 57 MMHG | SYSTOLIC BLOOD PRESSURE: 106 MMHG

## 2021-10-25 VITALS — DIASTOLIC BLOOD PRESSURE: 56 MMHG | SYSTOLIC BLOOD PRESSURE: 109 MMHG

## 2021-10-25 VITALS — DIASTOLIC BLOOD PRESSURE: 56 MMHG | SYSTOLIC BLOOD PRESSURE: 97 MMHG

## 2021-10-25 VITALS — SYSTOLIC BLOOD PRESSURE: 94 MMHG | DIASTOLIC BLOOD PRESSURE: 53 MMHG

## 2021-10-25 LAB
ALBUMIN SERPL BCP-MCNC: 2.1 G/DL (ref 3.4–5)
ALBUMIN/GLOB SERPL: 0.6 {RATIO} (ref 1.1–1.5)
ALP SERPL-CCNC: 60 IU/L (ref 46–116)
ALT SERPL W P-5'-P-CCNC: 48 U/L (ref 12–78)
ANION GAP SERPL CALCULATED.3IONS-SCNC: 10 MMOL/L (ref 8–16)
AST SERPL W P-5'-P-CCNC: 20 U/L (ref 10–37)
BASE EXCESS BLDA CALC-SCNC: 0 MMOL/L (ref -2–2)
BASOPHILS # BLD AUTO: 0 X10'3 (ref 0–0.2)
BASOPHILS NFR BLD AUTO: 0.3 % (ref 0–1)
BILIRUB SERPL-MCNC: 0.2 MG/DL (ref 0.1–1)
BODY TEMPERATURE: 37.5
BUN SERPL-MCNC: 14 MG/DL (ref 7–18)
BUN/CREAT SERPL: 23 (ref 6.6–38)
CALCIUM SERPL-MCNC: 8 MG/DL (ref 8.5–10.1)
CHLORIDE SERPL-SCNC: 112 MMOL/L (ref 99–107)
CO2 SERPL-SCNC: 24.2 MMOL/L (ref 24–32)
COHGB MFR BLDA: 0.2 % (ref 0–3.9)
CREAT SERPL-MCNC: 0.61 MG/DL (ref 0.4–0.9)
CRP SERPL HS-MCNC: 0.57 MG/DL (ref 0–0.5)
D DIMER PPP FEU-MCNC: 1.01 MG/L FEU (ref 0–0.5)
EOSINOPHIL # BLD AUTO: 0.1 X10'3 (ref 0–0.9)
EOSINOPHIL NFR BLD AUTO: 2.1 % (ref 0–6)
ERYTHROCYTE [DISTWIDTH] IN BLOOD BY AUTOMATED COUNT: 14.5 % (ref 11.5–14.5)
GFR SERPL CREATININE-BSD FRML MDRD: > 90 ML/MIN
GLUCOSE SERPL-MCNC: 80 MG/DL (ref 70–104)
HCO3 BLDA-SCNC: 23.6 MMOL/L (ref 22–26)
HCT VFR BLD AUTO: 31.2 % (ref 35–45)
HGB BLD-MCNC: 10.5 G/DL (ref 12–16)
HGB BLDA-MCNC: 11.3 G/DL (ref 12–16)
LDH SERPL-CCNC: 492 U/L (ref 81–234)
LYMPHOCYTES # BLD AUTO: 0.9 X10'3 (ref 1.1–4.8)
LYMPHOCYTES NFR BLD AUTO: 12.3 % (ref 21–51)
MAGNESIUM SERPL-MCNC: 2 MG/DL (ref 1.5–2.4)
MCH RBC QN AUTO: 30.4 PG (ref 27–31)
MCHC RBC AUTO-ENTMCNC: 33.8 G/DL (ref 33–36.5)
MCV RBC AUTO: 90 FL (ref 78–98)
METHGB MFR BLDA: 0 % (ref 0–1.5)
MONOCYTES # BLD AUTO: 0.4 X10'3 (ref 0–0.9)
MONOCYTES NFR BLD AUTO: 5.4 % (ref 2–12)
NEUTROPHILS # BLD AUTO: 5.8 X10'3 (ref 1.8–7.7)
NEUTROPHILS NFR BLD AUTO: 79.9 % (ref 42–75)
O2/TOTAL GAS SETTING VFR VENT: 90 MMHG/%
OXYHGB MFR BLDA: 94.3 % (ref 94–97)
PCO2 TEMP ADJ BLDA: 35.5 MMHG (ref 32–45)
PEEP RESPIRATORY: 11 CM H2O
PHOSPHATE SERPL-MCNC: 2.9 MG/DL (ref 2.3–4.5)
PLATELET # BLD AUTO: 177 X10'3 (ref 140–440)
PMV BLD AUTO: 8.9 FL (ref 7.4–10.4)
PO2 TEMP ADJ BLD: 74.2 MMHG (ref 75–100)
POTASSIUM SERPL-SCNC: 3.5 MMOL/L (ref 3.5–5.1)
PREALB SERPL-MCNC: 27.7 MG/DL (ref 19–36)
PROT SERPL-MCNC: 5.6 G/DL (ref 6.4–8.2)
RBC # BLD AUTO: 3.46 X10'6 (ref 4.2–5.6)
RESP RATE 1H: 18 B/MIN
SAO2 % BLDA: 94.5 % (ref 94–97)
SODIUM SERPL-SCNC: 146 MMOL/L (ref 135–145)
SPONT VT COMPRES GAS VOL SET UNCOR VENT: 550 ML
WBC # BLD AUTO: 7.2 X10'3 (ref 4.5–11)

## 2021-10-25 RX ADMIN — LEVALBUTEROL SCH MG: 0.63 SOLUTION RESPIRATORY (INHALATION) at 15:00

## 2021-10-25 RX ADMIN — BUDESONIDE SCH MG: 0.5 INHALANT RESPIRATORY (INHALATION) at 19:15

## 2021-10-25 RX ADMIN — Medication PRN MLS/HR: at 21:15

## 2021-10-25 RX ADMIN — Medication SCH MG: at 07:44

## 2021-10-25 RX ADMIN — DOCUSATE SODIUM SCH MG: 50 LIQUID ORAL at 08:00

## 2021-10-25 RX ADMIN — Medication SCH MLS/HR: at 14:30

## 2021-10-25 RX ADMIN — Medication SCH MLS/HR: at 23:55

## 2021-10-25 RX ADMIN — BUDESONIDE SCH MG: 0.5 INHALANT RESPIRATORY (INHALATION) at 10:33

## 2021-10-25 RX ADMIN — ENOXAPARIN SODIUM SCH MG: 100 INJECTION SUBCUTANEOUS at 20:56

## 2021-10-25 RX ADMIN — ENOXAPARIN SODIUM SCH MG: 100 INJECTION SUBCUTANEOUS at 07:46

## 2021-10-25 RX ADMIN — Medication SCH CAN: at 08:00

## 2021-10-25 RX ADMIN — VANCOMYCIN HYDROCHLORIDE SCH MLS/HR: 750 INJECTION, POWDER, LYOPHILIZED, FOR SOLUTION INTRAVENOUS at 10:04

## 2021-10-25 RX ADMIN — Medication PRN MLS/HR: at 14:36

## 2021-10-25 RX ADMIN — VANCOMYCIN HYDROCHLORIDE SCH MLS/HR: 750 INJECTION, POWDER, LYOPHILIZED, FOR SOLUTION INTRAVENOUS at 18:01

## 2021-10-25 RX ADMIN — SODIUM CHLORIDE, SODIUM LACTATE, POTASSIUM CHLORIDE, AND CALCIUM CHLORIDE SCH MLS/HR: .6; .31; .03; .02 INJECTION, SOLUTION INTRAVENOUS at 00:05

## 2021-10-25 RX ADMIN — DOCUSATE SODIUM SCH MG: 50 LIQUID ORAL at 20:55

## 2021-10-25 RX ADMIN — PROPOFOL SCH MLS/HR: 10 INJECTION, EMULSION INTRAVENOUS at 02:12

## 2021-10-25 RX ADMIN — METHYLPREDNISOLONE SODIUM SUCCINATE SCH MG: 40 INJECTION, POWDER, FOR SOLUTION INTRAMUSCULAR; INTRAVENOUS at 07:45

## 2021-10-25 RX ADMIN — INSULIN GLARGINE SCH UNIT: 100 INJECTION, SOLUTION SUBCUTANEOUS at 21:00

## 2021-10-25 RX ADMIN — SODIUM CHLORIDE SCH MLS/HR: 9 INJECTION INTRAMUSCULAR; INTRAVENOUS; SUBCUTANEOUS at 08:49

## 2021-10-25 RX ADMIN — LEVALBUTEROL SCH MG: 0.63 SOLUTION RESPIRATORY (INHALATION) at 19:15

## 2021-10-25 RX ADMIN — SODIUM CHLORIDE, SODIUM LACTATE, POTASSIUM CHLORIDE, AND CALCIUM CHLORIDE SCH MLS/HR: .6; .31; .03; .02 INJECTION, SOLUTION INTRAVENOUS at 10:05

## 2021-10-25 RX ADMIN — PROPOFOL SCH MLS/HR: 10 INJECTION, EMULSION INTRAVENOUS at 20:54

## 2021-10-25 RX ADMIN — LEVALBUTEROL SCH MG: 0.63 SOLUTION RESPIRATORY (INHALATION) at 10:33

## 2021-10-25 RX ADMIN — LEVALBUTEROL SCH MG: 0.63 SOLUTION RESPIRATORY (INHALATION) at 03:12

## 2021-10-25 RX ADMIN — Medication SCH MLS/HR: at 06:01

## 2021-10-25 RX ADMIN — SODIUM CHLORIDE, SODIUM LACTATE, POTASSIUM CHLORIDE, AND CALCIUM CHLORIDE SCH MLS/HR: .6; .31; .03; .02 INJECTION, SOLUTION INTRAVENOUS at 20:56

## 2021-10-25 NOTE — NUR
Problems reprioritized. Patient report given, questions answered & plan of care reviewed 
with ABISAI Mckee.

## 2021-10-25 NOTE — NUR
TF Consult: Pt remains intubated w/ MAP 74 and TF to start today per MD. 

Noted no documentation of oral access in EMR; OG in place per RN this AM. 

TF recs below adjusted for pt current propofol rate at 32.727ml/hr 

providing additional 864 kcals/day. Noted LBM 10/19 6 days constipation w/ 

routine colace ordered; may benefit from additional bowel care this admit. 

Will monitor for TF tolerance and adjustment needs.  

Recommendations:

1) Continuos TF per MD. Given Propofol at 32.727mL/hr, continuous Vital 

High Protein with 30mL/hr goal to provide 720mL total volume/day, 720kcal, 

63g protein, and 605mL water. Will NOT meet estimated protein needs but 

will meet estimated energy needs combined with kcal from Propofol

2) IF TF and Propofol weaned off, continuous Vital High Protein with 65

mL/hr goal rate to provide 1560 mL total volume/day, 1560 kcal, 137 g

protein, and 1304 mL water

3) additional 150 mL water flush Q4H; monitor serum Na and adjust

as appropriate

4) prealbumin q Monday/Thursday; daily scaled weights

5) Routine bowel care; 6 days constipation

6) DM education once stable following extubation, A1c 6.8% and no PMH of

DM in EMR. Pt will need official DM dx by physician prior to RD education

-------------------------------------------------------------------------------

Addendum: 10/25/21 at 1245 by Billy Andujar RD

-------------------------------------------------------------------------------

Amended: Links added.

## 2021-10-25 NOTE — NUR
January 27, 2020    Chapo Jacobo  [address] [facility] [address]          Dear Chapo Jacobo:  MRN: [id number]    This is a follow up to your recent labs, your lab results were stable.  There are no medicine changes.  Please have your labs drawn again on 5/4/2020.      If you cannot have your labs drawn on the scheduled date, it is your responsibility to call the transplant department to reschedule.  To reschedule or make an appointment, please as to speak to or leave a message for my assistant, Rosy Huntley or Racheal, at [phone].  When leaving a message for Rosy Huntley Angela or myself, we ask that you leave a brief message regarding your request.    Sincerely,      Racheal Choudhary, RN, BSN, CCTC  Your Liver Transplant Coordinator    [facility]  [address] [place] [address] [phone]        Patient in room CICU 2009. I have received report from   ABISAI Grewal and had the opportunity 
to ask questions and assume patient care.

## 2021-10-26 VITALS — DIASTOLIC BLOOD PRESSURE: 52 MMHG | SYSTOLIC BLOOD PRESSURE: 120 MMHG

## 2021-10-26 VITALS — DIASTOLIC BLOOD PRESSURE: 67 MMHG | SYSTOLIC BLOOD PRESSURE: 94 MMHG

## 2021-10-26 VITALS — DIASTOLIC BLOOD PRESSURE: 53 MMHG | SYSTOLIC BLOOD PRESSURE: 111 MMHG

## 2021-10-26 VITALS — SYSTOLIC BLOOD PRESSURE: 138 MMHG | DIASTOLIC BLOOD PRESSURE: 75 MMHG

## 2021-10-26 VITALS — DIASTOLIC BLOOD PRESSURE: 51 MMHG | SYSTOLIC BLOOD PRESSURE: 106 MMHG

## 2021-10-26 VITALS — DIASTOLIC BLOOD PRESSURE: 67 MMHG | SYSTOLIC BLOOD PRESSURE: 106 MMHG

## 2021-10-26 VITALS — DIASTOLIC BLOOD PRESSURE: 60 MMHG | SYSTOLIC BLOOD PRESSURE: 100 MMHG

## 2021-10-26 VITALS — SYSTOLIC BLOOD PRESSURE: 101 MMHG | DIASTOLIC BLOOD PRESSURE: 67 MMHG

## 2021-10-26 VITALS — DIASTOLIC BLOOD PRESSURE: 49 MMHG | SYSTOLIC BLOOD PRESSURE: 101 MMHG

## 2021-10-26 VITALS — SYSTOLIC BLOOD PRESSURE: 92 MMHG | DIASTOLIC BLOOD PRESSURE: 50 MMHG

## 2021-10-26 VITALS — DIASTOLIC BLOOD PRESSURE: 65 MMHG | SYSTOLIC BLOOD PRESSURE: 92 MMHG

## 2021-10-26 VITALS — SYSTOLIC BLOOD PRESSURE: 98 MMHG | DIASTOLIC BLOOD PRESSURE: 49 MMHG

## 2021-10-26 VITALS — DIASTOLIC BLOOD PRESSURE: 48 MMHG | SYSTOLIC BLOOD PRESSURE: 102 MMHG

## 2021-10-26 VITALS — DIASTOLIC BLOOD PRESSURE: 87 MMHG | SYSTOLIC BLOOD PRESSURE: 150 MMHG

## 2021-10-26 VITALS — DIASTOLIC BLOOD PRESSURE: 66 MMHG | SYSTOLIC BLOOD PRESSURE: 97 MMHG

## 2021-10-26 VITALS — SYSTOLIC BLOOD PRESSURE: 113 MMHG | DIASTOLIC BLOOD PRESSURE: 49 MMHG

## 2021-10-26 VITALS — DIASTOLIC BLOOD PRESSURE: 51 MMHG | SYSTOLIC BLOOD PRESSURE: 97 MMHG

## 2021-10-26 VITALS — DIASTOLIC BLOOD PRESSURE: 46 MMHG | SYSTOLIC BLOOD PRESSURE: 84 MMHG

## 2021-10-26 VITALS — SYSTOLIC BLOOD PRESSURE: 93 MMHG | DIASTOLIC BLOOD PRESSURE: 58 MMHG

## 2021-10-26 VITALS — DIASTOLIC BLOOD PRESSURE: 71 MMHG | SYSTOLIC BLOOD PRESSURE: 106 MMHG

## 2021-10-26 VITALS — DIASTOLIC BLOOD PRESSURE: 42 MMHG | SYSTOLIC BLOOD PRESSURE: 72 MMHG

## 2021-10-26 VITALS — SYSTOLIC BLOOD PRESSURE: 88 MMHG | DIASTOLIC BLOOD PRESSURE: 59 MMHG

## 2021-10-26 LAB
ALBUMIN SERPL BCP-MCNC: 1.9 G/DL (ref 3.4–5)
ALBUMIN/GLOB SERPL: 0.5 {RATIO} (ref 1.1–1.5)
ALP SERPL-CCNC: 56 IU/L (ref 46–116)
ALT SERPL W P-5'-P-CCNC: 39 U/L (ref 12–78)
ANION GAP SERPL CALCULATED.3IONS-SCNC: 8 MMOL/L (ref 8–16)
AST SERPL W P-5'-P-CCNC: 23 U/L (ref 10–37)
BASE EXCESS BLDA CALC-SCNC: -2.9 MMOL/L (ref -2–2)
BASOPHILS # BLD AUTO: 0 X10'3 (ref 0–0.2)
BASOPHILS NFR BLD AUTO: 0.2 % (ref 0–1)
BILIRUB SERPL-MCNC: 0.3 MG/DL (ref 0.1–1)
BODY TEMPERATURE: 36.9
BUN SERPL-MCNC: 18 MG/DL (ref 7–18)
BUN/CREAT SERPL: 29 (ref 6.6–38)
CALCIUM SERPL-MCNC: 7.7 MG/DL (ref 8.5–10.1)
CHLORIDE SERPL-SCNC: 110 MMOL/L (ref 99–107)
CO2 SERPL-SCNC: 25.3 MMOL/L (ref 24–32)
COHGB MFR BLDA: 0.7 % (ref 0–3.9)
CREAT SERPL-MCNC: 0.62 MG/DL (ref 0.4–0.9)
CRP SERPL HS-MCNC: 0.25 MG/DL (ref 0–0.5)
D DIMER PPP FEU-MCNC: 1.05 MG/L FEU (ref 0–0.5)
EOSINOPHIL # BLD AUTO: 0.2 X10'3 (ref 0–0.9)
EOSINOPHIL NFR BLD AUTO: 2.8 % (ref 0–6)
ERYTHROCYTE [DISTWIDTH] IN BLOOD BY AUTOMATED COUNT: 14.5 % (ref 11.5–14.5)
GFR SERPL CREATININE-BSD FRML MDRD: > 90 ML/MIN
GLUCOSE SERPL-MCNC: 71 MG/DL (ref 70–104)
HCO3 BLDA-SCNC: 21.7 MMOL/L (ref 22–26)
HCT VFR BLD AUTO: 30.7 % (ref 35–45)
HGB BLD-MCNC: 10.2 G/DL (ref 12–16)
HGB BLDA-MCNC: 11.1 G/DL (ref 12–16)
LDH SERPL-CCNC: 550 U/L (ref 81–234)
LYMPHOCYTES # BLD AUTO: 0.8 X10'3 (ref 1.1–4.8)
LYMPHOCYTES NFR BLD AUTO: 13.1 % (ref 21–51)
MAGNESIUM SERPL-MCNC: 1.9 MG/DL (ref 1.5–2.4)
MCH RBC QN AUTO: 30.6 PG (ref 27–31)
MCHC RBC AUTO-ENTMCNC: 33.3 G/DL (ref 33–36.5)
MCV RBC AUTO: 91.7 FL (ref 78–98)
METHGB MFR BLDA: 0.3 % (ref 0–1.5)
MONOCYTES # BLD AUTO: 0.2 X10'3 (ref 0–0.9)
MONOCYTES NFR BLD AUTO: 2.8 % (ref 2–12)
NEUTROPHILS # BLD AUTO: 5.1 X10'3 (ref 1.8–7.7)
NEUTROPHILS NFR BLD AUTO: 81.1 % (ref 42–75)
O2/TOTAL GAS SETTING VFR VENT: 90 MMHG/%
OXYHGB MFR BLDA: 85.8 % (ref 94–97)
PCO2 TEMP ADJ BLDA: 36.5 MMHG (ref 32–45)
PEEP RESPIRATORY: 11 CM H2O
PHOSPHATE SERPL-MCNC: 2.7 MG/DL (ref 2.3–4.5)
PLATELET # BLD AUTO: 166 X10'3 (ref 140–440)
PMV BLD AUTO: 9.4 FL (ref 7.4–10.4)
PO2 TEMP ADJ BLD: 50.2 MMHG (ref 75–100)
POTASSIUM SERPL-SCNC: 3.5 MMOL/L (ref 3.5–5.1)
PROT SERPL-MCNC: 5.6 G/DL (ref 6.4–8.2)
RBC # BLD AUTO: 3.35 X10'6 (ref 4.2–5.6)
RESP RATE 1H: 18 B/MIN
SAO2 % BLDA: 86.7 % (ref 94–97)
SODIUM SERPL-SCNC: 143 MMOL/L (ref 135–145)
SPONT VT COMPRES GAS VOL SET UNCOR VENT: 550 ML
WBC # BLD AUTO: 6.3 X10'3 (ref 4.5–11)

## 2021-10-26 RX ADMIN — INSULIN HUMAN SCH UNIT: 100 INJECTION, SOLUTION PARENTERAL at 16:47

## 2021-10-26 RX ADMIN — ENOXAPARIN SODIUM SCH MG: 100 INJECTION SUBCUTANEOUS at 19:47

## 2021-10-26 RX ADMIN — DOCUSATE SODIUM SCH MG: 50 LIQUID ORAL at 19:48

## 2021-10-26 RX ADMIN — BUDESONIDE SCH MG: 0.5 INHALANT RESPIRATORY (INHALATION) at 19:45

## 2021-10-26 RX ADMIN — INSULIN GLARGINE SCH UNIT: 100 INJECTION, SOLUTION SUBCUTANEOUS at 21:00

## 2021-10-26 RX ADMIN — METHYLPREDNISOLONE SODIUM SUCCINATE SCH MG: 40 INJECTION, POWDER, FOR SOLUTION INTRAMUSCULAR; INTRAVENOUS at 07:24

## 2021-10-26 RX ADMIN — LEVALBUTEROL SCH MG: 0.63 SOLUTION RESPIRATORY (INHALATION) at 02:36

## 2021-10-26 RX ADMIN — Medication PRN MLS/HR: at 05:53

## 2021-10-26 RX ADMIN — BUDESONIDE SCH MG: 0.5 INHALANT RESPIRATORY (INHALATION) at 09:39

## 2021-10-26 RX ADMIN — Medication PRN MLS/HR: at 22:24

## 2021-10-26 RX ADMIN — AMIODARONE HYDROCHLORIDE SCH MLS/HR: 1.8 INJECTION, SOLUTION INTRAVENOUS at 16:33

## 2021-10-26 RX ADMIN — AMIODARONE HYDROCHLORIDE SCH MLS/HR: 1.8 INJECTION, SOLUTION INTRAVENOUS at 22:41

## 2021-10-26 RX ADMIN — SODIUM CHLORIDE SCH MLS/HR: 9 INJECTION INTRAMUSCULAR; INTRAVENOUS; SUBCUTANEOUS at 07:24

## 2021-10-26 RX ADMIN — PROPOFOL SCH MLS/HR: 10 INJECTION, EMULSION INTRAVENOUS at 15:24

## 2021-10-26 RX ADMIN — PROPOFOL SCH MLS/HR: 10 INJECTION, EMULSION INTRAVENOUS at 22:24

## 2021-10-26 RX ADMIN — SODIUM CHLORIDE, SODIUM LACTATE, POTASSIUM CHLORIDE, AND CALCIUM CHLORIDE SCH MLS/HR: .6; .31; .03; .02 INJECTION, SOLUTION INTRAVENOUS at 06:05

## 2021-10-26 RX ADMIN — Medication PRN MLS/HR: at 17:13

## 2021-10-26 RX ADMIN — LEVALBUTEROL SCH MG: 0.63 SOLUTION RESPIRATORY (INHALATION) at 09:39

## 2021-10-26 RX ADMIN — LEVALBUTEROL SCH MG: 0.63 SOLUTION RESPIRATORY (INHALATION) at 15:39

## 2021-10-26 RX ADMIN — VANCOMYCIN HYDROCHLORIDE SCH MLS/HR: 750 INJECTION, POWDER, LYOPHILIZED, FOR SOLUTION INTRAVENOUS at 08:50

## 2021-10-26 RX ADMIN — PROPOFOL SCH MLS/HR: 10 INJECTION, EMULSION INTRAVENOUS at 08:47

## 2021-10-26 RX ADMIN — Medication PRN MLS/HR: at 11:30

## 2021-10-26 RX ADMIN — ENOXAPARIN SODIUM SCH MG: 100 INJECTION SUBCUTANEOUS at 07:25

## 2021-10-26 RX ADMIN — VANCOMYCIN HYDROCHLORIDE SCH MLS/HR: 750 INJECTION, POWDER, LYOPHILIZED, FOR SOLUTION INTRAVENOUS at 16:54

## 2021-10-26 RX ADMIN — AMIODARONE HYDROCHLORIDE SCH MLS/HR: 1.8 INJECTION, SOLUTION INTRAVENOUS at 23:53

## 2021-10-26 RX ADMIN — LEVALBUTEROL SCH MG: 0.63 SOLUTION RESPIRATORY (INHALATION) at 15:00

## 2021-10-26 RX ADMIN — VANCOMYCIN HYDROCHLORIDE SCH MLS/HR: 750 INJECTION, POWDER, LYOPHILIZED, FOR SOLUTION INTRAVENOUS at 00:00

## 2021-10-26 RX ADMIN — DOCUSATE SODIUM SCH MG: 50 LIQUID ORAL at 07:26

## 2021-10-26 RX ADMIN — LEVALBUTEROL SCH MG: 0.63 SOLUTION RESPIRATORY (INHALATION) at 19:45

## 2021-10-26 RX ADMIN — Medication SCH MG: at 07:24

## 2021-10-26 NOTE — NUR
Pt responded well to albumin and amiodarone. HR now 100 bpm, sinus tachycardia, BP 91/64 
(71) on 0.05 mcg/kg/min levophed. SpO2 86% on A/C PRVC mode with FiO2 100% PEEP 18. Urine 
output improved. Pt's children allowed on unit and had opportunity to visit.

## 2021-10-26 NOTE — NUR
Patient in room CICU 2009. I have received report from  ABISAI Mckee  and had the opportunity 
to ask questions and assume patient care.

## 2021-10-26 NOTE — NUR
Pt hypoxic following oral care, SpO2 dropping into 70's. RT adjusting ventilator settings, 
PEEP increased to 15, FIO2 100% AC/ PRVC mode.  Pt placed in prone position. Arterial line 
discontinued d/t some bruising at insertion site and infection risk, manual pressure held 5 
mins.

## 2021-10-26 NOTE — NUR
Pt's POA/ sister Katia called requesting extensive information on ventilator settings from 
the previous week.  Call deferred to charge nurse ABISAI Shepherd. Case Management to follow up. 
 Pt remains hypoxic despite proning and maximal ventilatory support.

## 2021-10-27 VITALS — SYSTOLIC BLOOD PRESSURE: 106 MMHG | DIASTOLIC BLOOD PRESSURE: 69 MMHG

## 2021-10-27 VITALS — DIASTOLIC BLOOD PRESSURE: 70 MMHG | SYSTOLIC BLOOD PRESSURE: 108 MMHG

## 2021-10-27 VITALS — SYSTOLIC BLOOD PRESSURE: 103 MMHG | DIASTOLIC BLOOD PRESSURE: 67 MMHG

## 2021-10-27 VITALS — DIASTOLIC BLOOD PRESSURE: 64 MMHG | SYSTOLIC BLOOD PRESSURE: 104 MMHG

## 2021-10-27 VITALS — SYSTOLIC BLOOD PRESSURE: 115 MMHG | DIASTOLIC BLOOD PRESSURE: 70 MMHG

## 2021-10-27 VITALS — DIASTOLIC BLOOD PRESSURE: 70 MMHG | SYSTOLIC BLOOD PRESSURE: 100 MMHG

## 2021-10-27 VITALS — SYSTOLIC BLOOD PRESSURE: 115 MMHG | DIASTOLIC BLOOD PRESSURE: 69 MMHG

## 2021-10-27 VITALS — SYSTOLIC BLOOD PRESSURE: 97 MMHG | DIASTOLIC BLOOD PRESSURE: 59 MMHG

## 2021-10-27 VITALS — DIASTOLIC BLOOD PRESSURE: 66 MMHG | SYSTOLIC BLOOD PRESSURE: 111 MMHG

## 2021-10-27 VITALS — SYSTOLIC BLOOD PRESSURE: 120 MMHG | DIASTOLIC BLOOD PRESSURE: 66 MMHG

## 2021-10-27 VITALS — SYSTOLIC BLOOD PRESSURE: 100 MMHG | DIASTOLIC BLOOD PRESSURE: 68 MMHG

## 2021-10-27 VITALS — DIASTOLIC BLOOD PRESSURE: 76 MMHG | SYSTOLIC BLOOD PRESSURE: 121 MMHG

## 2021-10-27 VITALS — DIASTOLIC BLOOD PRESSURE: 64 MMHG | SYSTOLIC BLOOD PRESSURE: 115 MMHG

## 2021-10-27 VITALS — DIASTOLIC BLOOD PRESSURE: 69 MMHG | SYSTOLIC BLOOD PRESSURE: 115 MMHG

## 2021-10-27 VITALS — DIASTOLIC BLOOD PRESSURE: 60 MMHG | SYSTOLIC BLOOD PRESSURE: 86 MMHG

## 2021-10-27 VITALS — DIASTOLIC BLOOD PRESSURE: 69 MMHG | SYSTOLIC BLOOD PRESSURE: 105 MMHG

## 2021-10-27 VITALS — SYSTOLIC BLOOD PRESSURE: 96 MMHG | DIASTOLIC BLOOD PRESSURE: 61 MMHG

## 2021-10-27 VITALS — DIASTOLIC BLOOD PRESSURE: 52 MMHG | SYSTOLIC BLOOD PRESSURE: 109 MMHG

## 2021-10-27 VITALS — DIASTOLIC BLOOD PRESSURE: 70 MMHG | SYSTOLIC BLOOD PRESSURE: 107 MMHG

## 2021-10-27 VITALS — SYSTOLIC BLOOD PRESSURE: 93 MMHG | DIASTOLIC BLOOD PRESSURE: 60 MMHG

## 2021-10-27 VITALS — SYSTOLIC BLOOD PRESSURE: 125 MMHG | DIASTOLIC BLOOD PRESSURE: 82 MMHG

## 2021-10-27 VITALS — DIASTOLIC BLOOD PRESSURE: 81 MMHG | SYSTOLIC BLOOD PRESSURE: 141 MMHG

## 2021-10-27 LAB
ALBUMIN SERPL BCP-MCNC: 2.7 G/DL (ref 3.4–5)
ALBUMIN/GLOB SERPL: 0.8 {RATIO} (ref 1.1–1.5)
ALP SERPL-CCNC: 78 IU/L (ref 46–116)
ALT SERPL W P-5'-P-CCNC: 167 U/L (ref 12–78)
ANION GAP SERPL CALCULATED.3IONS-SCNC: 8 MMOL/L (ref 8–16)
ARTERIAL PATENCY WRIST A: POSITIVE
AST SERPL W P-5'-P-CCNC: 106 U/L (ref 10–37)
BASE EXCESS BLDA CALC-SCNC: -4.4 MMOL/L (ref -2–2)
BASOPHILS # BLD AUTO: 0 X10'3 (ref 0–0.2)
BASOPHILS NFR BLD AUTO: 0.6 % (ref 0–1)
BILIRUB SERPL-MCNC: 0.3 MG/DL (ref 0.1–1)
BODY TEMPERATURE: 37.6
BUN SERPL-MCNC: 21 MG/DL (ref 7–18)
BUN/CREAT SERPL: 25.9 (ref 6.6–38)
CALCIUM SERPL-MCNC: 7.7 MG/DL (ref 8.5–10.1)
CHLORIDE SERPL-SCNC: 111 MMOL/L (ref 99–107)
CO2 SERPL-SCNC: 25 MMOL/L (ref 24–32)
COHGB MFR BLDA: 1 % (ref 0–3.9)
CREAT SERPL-MCNC: 0.81 MG/DL (ref 0.4–0.9)
CRP SERPL HS-MCNC: 1.36 MG/DL (ref 0–0.5)
D DIMER PPP FEU-MCNC: 1.61 MG/L FEU (ref 0–0.5)
EOSINOPHIL # BLD AUTO: 0.3 X10'3 (ref 0–0.9)
EOSINOPHIL NFR BLD AUTO: 3.2 % (ref 0–6)
EOSINOPHIL NFR BLD MANUAL: 2 % (ref 0–6)
ERYTHROCYTE [DISTWIDTH] IN BLOOD BY AUTOMATED COUNT: 15.1 % (ref 11.5–14.5)
GFR SERPL CREATININE-BSD FRML MDRD: 73 ML/MIN
GLUCOSE SERPL-MCNC: 95 MG/DL (ref 70–104)
HCO3 BLDA-SCNC: 22.7 MMOL/L (ref 22–26)
HCT VFR BLD AUTO: 33.3 % (ref 35–45)
HGB BLD-MCNC: 10.9 G/DL (ref 12–16)
HGB BLDA-MCNC: 12.2 G/DL (ref 12–16)
LDH SERPL-CCNC: 682 U/L (ref 81–234)
LG PLATELETS BLD QL SMEAR: (no result)
LYMPHOCYTES # BLD AUTO: 1.1 X10'3 (ref 1.1–4.8)
LYMPHOCYTES NFR BLD AUTO: 13.1 % (ref 21–51)
LYMPHOCYTES NFR BLD MANUAL: 11 % (ref 21–51)
MAGNESIUM SERPL-MCNC: 2.7 MG/DL (ref 1.5–2.4)
MCH RBC QN AUTO: 30.4 PG (ref 27–31)
MCHC RBC AUTO-ENTMCNC: 32.9 G/DL (ref 33–36.5)
MCV RBC AUTO: 92.5 FL (ref 78–98)
METAMYELOCYTES NFR BLD MANUAL: 2 % (ref 0–0)
METHGB MFR BLDA: 0.2 % (ref 0–1.5)
MONOCYTES # BLD AUTO: 0.3 X10'3 (ref 0–0.9)
MONOCYTES NFR BLD AUTO: 3.3 % (ref 2–12)
MONOCYTES NFR BLD MANUAL: 5 % (ref 2–12)
NEUTROPHILS # BLD AUTO: 6.8 X10'3 (ref 1.8–7.7)
NEUTROPHILS NFR BLD AUTO: 79.8 % (ref 42–75)
NEUTS BAND # BLD MANUAL: 13 % (ref 0–10)
NEUTS SEG NFR BLD MANUAL: 67 % (ref 42–75)
O2/TOTAL GAS SETTING VFR VENT: 100 MMHG/%
OXYHGB MFR BLDA: 94.3 % (ref 94–97)
PCO2 TEMP ADJ BLDA: 51.1 MMHG (ref 32–45)
PEEP RESPIRATORY: 18 CM H2O
PHOSPHATE SERPL-MCNC: 3.6 MG/DL (ref 2.3–4.5)
PLATELET # BLD AUTO: 226 X10'3 (ref 140–440)
PLATELET BLD QL SMEAR: NORMAL
PMV BLD AUTO: 9 FL (ref 7.4–10.4)
PO2 TEMP ADJ BLD: 83.6 MMHG (ref 75–100)
POLYCHROMASIA BLD QL SMEAR: (no result)
POTASSIUM SERPL-SCNC: 3.6 MMOL/L (ref 3.5–5.1)
PROT SERPL-MCNC: 6.3 G/DL (ref 6.4–8.2)
RBC # BLD AUTO: 3.6 X10'6 (ref 4.2–5.6)
RBC MORPH BLD: (no result)
RESP RATE 1H: 18 B/MIN
SAO2 % BLDA: 95.4 % (ref 94–97)
SODIUM SERPL-SCNC: 144 MMOL/L (ref 135–145)
SPONT VT COMPRES GAS VOL SET UNCOR VENT: 500 ML
STOMATOCYTES BLD QL SMEAR: (no result)
TOTAL CELLS COUNTED FLD: 100
WBC # BLD AUTO: 8.5 X10'3 (ref 4.5–11)

## 2021-10-27 RX ADMIN — AMIODARONE HYDROCHLORIDE SCH MLS/HR: 1.8 INJECTION, SOLUTION INTRAVENOUS at 20:11

## 2021-10-27 RX ADMIN — Medication SCH MLS/HR: at 22:45

## 2021-10-27 RX ADMIN — BUDESONIDE SCH MG: 0.5 INHALANT RESPIRATORY (INHALATION) at 08:50

## 2021-10-27 RX ADMIN — Medication SCH MLS/HR: at 20:10

## 2021-10-27 RX ADMIN — ENOXAPARIN SODIUM SCH MG: 100 INJECTION SUBCUTANEOUS at 20:25

## 2021-10-27 RX ADMIN — SODIUM CHLORIDE, SODIUM LACTATE, POTASSIUM CHLORIDE, AND CALCIUM CHLORIDE SCH MLS/HR: .6; .31; .03; .02 INJECTION, SOLUTION INTRAVENOUS at 20:09

## 2021-10-27 RX ADMIN — LEVALBUTEROL SCH MG: 0.63 SOLUTION RESPIRATORY (INHALATION) at 15:01

## 2021-10-27 RX ADMIN — VANCOMYCIN HYDROCHLORIDE SCH MLS/HR: 750 INJECTION, POWDER, LYOPHILIZED, FOR SOLUTION INTRAVENOUS at 00:00

## 2021-10-27 RX ADMIN — Medication SCH MG: at 08:25

## 2021-10-27 RX ADMIN — LEVALBUTEROL SCH MG: 0.63 SOLUTION RESPIRATORY (INHALATION) at 19:07

## 2021-10-27 RX ADMIN — SODIUM CHLORIDE, SODIUM LACTATE, POTASSIUM CHLORIDE, AND CALCIUM CHLORIDE SCH MLS/HR: .6; .31; .03; .02 INJECTION, SOLUTION INTRAVENOUS at 22:05

## 2021-10-27 RX ADMIN — LEVALBUTEROL SCH MG: 0.63 SOLUTION RESPIRATORY (INHALATION) at 08:50

## 2021-10-27 RX ADMIN — METHYLPREDNISOLONE SODIUM SUCCINATE SCH MG: 40 INJECTION, POWDER, FOR SOLUTION INTRAMUSCULAR; INTRAVENOUS at 08:23

## 2021-10-27 RX ADMIN — Medication SCH MLS/HR: at 09:10

## 2021-10-27 RX ADMIN — AMIODARONE HYDROCHLORIDE SCH MLS/HR: 1.8 INJECTION, SOLUTION INTRAVENOUS at 20:26

## 2021-10-27 RX ADMIN — ENOXAPARIN SODIUM SCH MG: 100 INJECTION SUBCUTANEOUS at 08:24

## 2021-10-27 RX ADMIN — INSULIN GLARGINE SCH UNIT: 100 INJECTION, SOLUTION SUBCUTANEOUS at 20:27

## 2021-10-27 RX ADMIN — Medication PRN MLS/HR: at 05:37

## 2021-10-27 RX ADMIN — Medication SCH MLS/HR: at 04:10

## 2021-10-27 RX ADMIN — PROPOFOL SCH MLS/HR: 10 INJECTION, EMULSION INTRAVENOUS at 02:21

## 2021-10-27 RX ADMIN — SODIUM CHLORIDE, SODIUM LACTATE, POTASSIUM CHLORIDE, AND CALCIUM CHLORIDE SCH MLS/HR: .6; .31; .03; .02 INJECTION, SOLUTION INTRAVENOUS at 02:05

## 2021-10-27 RX ADMIN — PROPOFOL SCH MLS/HR: 10 INJECTION, EMULSION INTRAVENOUS at 05:37

## 2021-10-27 RX ADMIN — AMIODARONE HYDROCHLORIDE SCH MLS/HR: 1.8 INJECTION, SOLUTION INTRAVENOUS at 10:22

## 2021-10-27 RX ADMIN — AMIODARONE HYDROCHLORIDE SCH MLS/HR: 1.8 INJECTION, SOLUTION INTRAVENOUS at 21:05

## 2021-10-27 RX ADMIN — DOCUSATE SODIUM SCH MG: 50 LIQUID ORAL at 20:26

## 2021-10-27 RX ADMIN — Medication SCH MLS/HR: at 23:00

## 2021-10-27 RX ADMIN — DEXTROSE SCH MLS/HR: 50 INJECTION, SOLUTION INTRAVENOUS at 02:21

## 2021-10-27 RX ADMIN — SODIUM CHLORIDE SCH MLS/HR: 9 INJECTION INTRAMUSCULAR; INTRAVENOUS; SUBCUTANEOUS at 08:25

## 2021-10-27 RX ADMIN — VANCOMYCIN HYDROCHLORIDE SCH MLS/HR: 750 INJECTION, POWDER, LYOPHILIZED, FOR SOLUTION INTRAVENOUS at 08:00

## 2021-10-27 RX ADMIN — BUDESONIDE SCH MG: 0.5 INHALANT RESPIRATORY (INHALATION) at 19:07

## 2021-10-27 RX ADMIN — DOCUSATE SODIUM SCH MG: 50 LIQUID ORAL at 08:00

## 2021-10-27 NOTE — NUR
[facility] I have received report from Robel BARONE   and had the opportunity 
to ask questions and assume patient care.

-------------------------------------------------------------------------------

Addendum: 10/27/21 at 1840 by Brea Vazquez RN

-------------------------------------------------------------------------------

Amended: Links added.

## 2021-10-27 NOTE — NUR
F/u: Noted Propofol has been discontinued. TF recommendations have been 

adjusted accordingly and d/w charge RN.

Recommendations:

1) Continuous TF via OGT using Vital High Protein at 65 mL/hr goal rate to 

provide 1560 mL total volume/day, 1560 kcal, 137 g protein, and 1304 mL 

water; Adjust if Propofol is resumed

2) Additional 150 mL water flush Q4H; monitor serum Na and adjust

as appropriate

3) Prealbumin q Monday/Thursday; daily scaled weights

4) Routine bowel care; 8 days constipation

5) DM education once stable following extubation, A1c 6.8% and no PMH of

DM in EMR. Pt will need official DM dx by physician prior to RD education

-------------------------------------------------------------------------------

Addendum: 10/27/21 at 1349 by Carmela Walsh RD

-------------------------------------------------------------------------------

Amended: Links added.

## 2021-10-27 NOTE — NUR
Patient in prone position.  HOB 10 degrees reverse Trendelenburg 

-------------------------------------------------------------------------------

Addendum: 10/28/21 at 0119 by Brea Vazquez RN

-------------------------------------------------------------------------------

Amended: Links added.

## 2021-10-28 VITALS — SYSTOLIC BLOOD PRESSURE: 93 MMHG | DIASTOLIC BLOOD PRESSURE: 55 MMHG

## 2021-10-28 VITALS — DIASTOLIC BLOOD PRESSURE: 56 MMHG | SYSTOLIC BLOOD PRESSURE: 103 MMHG

## 2021-10-28 VITALS — DIASTOLIC BLOOD PRESSURE: 68 MMHG | SYSTOLIC BLOOD PRESSURE: 100 MMHG

## 2021-10-28 VITALS — SYSTOLIC BLOOD PRESSURE: 90 MMHG | DIASTOLIC BLOOD PRESSURE: 59 MMHG

## 2021-10-28 VITALS — SYSTOLIC BLOOD PRESSURE: 94 MMHG | DIASTOLIC BLOOD PRESSURE: 58 MMHG

## 2021-10-28 VITALS — SYSTOLIC BLOOD PRESSURE: 125 MMHG | DIASTOLIC BLOOD PRESSURE: 74 MMHG

## 2021-10-28 VITALS — SYSTOLIC BLOOD PRESSURE: 99 MMHG | DIASTOLIC BLOOD PRESSURE: 58 MMHG

## 2021-10-28 VITALS — SYSTOLIC BLOOD PRESSURE: 98 MMHG | DIASTOLIC BLOOD PRESSURE: 61 MMHG

## 2021-10-28 VITALS — SYSTOLIC BLOOD PRESSURE: 120 MMHG | DIASTOLIC BLOOD PRESSURE: 76 MMHG

## 2021-10-28 VITALS — DIASTOLIC BLOOD PRESSURE: 55 MMHG | SYSTOLIC BLOOD PRESSURE: 97 MMHG

## 2021-10-28 VITALS — DIASTOLIC BLOOD PRESSURE: 58 MMHG | SYSTOLIC BLOOD PRESSURE: 103 MMHG

## 2021-10-28 VITALS — DIASTOLIC BLOOD PRESSURE: 72 MMHG | SYSTOLIC BLOOD PRESSURE: 113 MMHG

## 2021-10-28 VITALS — DIASTOLIC BLOOD PRESSURE: 80 MMHG | SYSTOLIC BLOOD PRESSURE: 123 MMHG

## 2021-10-28 VITALS — SYSTOLIC BLOOD PRESSURE: 102 MMHG | DIASTOLIC BLOOD PRESSURE: 63 MMHG

## 2021-10-28 VITALS — DIASTOLIC BLOOD PRESSURE: 58 MMHG | SYSTOLIC BLOOD PRESSURE: 107 MMHG

## 2021-10-28 VITALS — SYSTOLIC BLOOD PRESSURE: 90 MMHG | DIASTOLIC BLOOD PRESSURE: 61 MMHG

## 2021-10-28 VITALS — SYSTOLIC BLOOD PRESSURE: 107 MMHG | DIASTOLIC BLOOD PRESSURE: 67 MMHG

## 2021-10-28 VITALS — DIASTOLIC BLOOD PRESSURE: 55 MMHG | SYSTOLIC BLOOD PRESSURE: 95 MMHG

## 2021-10-28 VITALS — SYSTOLIC BLOOD PRESSURE: 102 MMHG | DIASTOLIC BLOOD PRESSURE: 60 MMHG

## 2021-10-28 VITALS — DIASTOLIC BLOOD PRESSURE: 62 MMHG | SYSTOLIC BLOOD PRESSURE: 98 MMHG

## 2021-10-28 VITALS — DIASTOLIC BLOOD PRESSURE: 56 MMHG | SYSTOLIC BLOOD PRESSURE: 90 MMHG

## 2021-10-28 VITALS — SYSTOLIC BLOOD PRESSURE: 108 MMHG | DIASTOLIC BLOOD PRESSURE: 62 MMHG

## 2021-10-28 VITALS — SYSTOLIC BLOOD PRESSURE: 97 MMHG | DIASTOLIC BLOOD PRESSURE: 55 MMHG

## 2021-10-28 LAB
ALBUMIN SERPL BCP-MCNC: 2.3 G/DL (ref 3.4–5)
ALBUMIN/GLOB SERPL: 0.6 {RATIO} (ref 1.1–1.5)
ALP SERPL-CCNC: 78 IU/L (ref 46–116)
ALT SERPL W P-5'-P-CCNC: 127 U/L (ref 12–78)
ANION GAP SERPL CALCULATED.3IONS-SCNC: 6 MMOL/L (ref 8–16)
ARTERIAL PATENCY WRIST A: POSITIVE
AST SERPL W P-5'-P-CCNC: 51 U/L (ref 10–37)
BASE EXCESS BLDA CALC-SCNC: 0 MMOL/L (ref -2–2)
BASOPHILS # BLD AUTO: 0 X10'3 (ref 0–0.2)
BASOPHILS NFR BLD AUTO: 0.5 % (ref 0–1)
BILIRUB SERPL-MCNC: 0.3 MG/DL (ref 0.1–1)
BODY TEMPERATURE: 37.3
BUN SERPL-MCNC: 14 MG/DL (ref 7–18)
BUN/CREAT SERPL: 24.6 (ref 6.6–38)
CALCIUM SERPL-MCNC: 8 MG/DL (ref 8.5–10.1)
CHLORIDE SERPL-SCNC: 109 MMOL/L (ref 99–107)
CO2 SERPL-SCNC: 27.7 MMOL/L (ref 24–32)
COHGB MFR BLDA: 0.7 % (ref 0–3.9)
CREAT SERPL-MCNC: 0.57 MG/DL (ref 0.4–0.9)
EOSINOPHIL # BLD AUTO: 0.6 X10'3 (ref 0–0.9)
EOSINOPHIL NFR BLD AUTO: 6.6 % (ref 0–6)
ERYTHROCYTE [DISTWIDTH] IN BLOOD BY AUTOMATED COUNT: 14.9 % (ref 11.5–14.5)
GFR SERPL CREATININE-BSD FRML MDRD: > 90 ML/MIN
GLUCOSE SERPL-MCNC: 93 MG/DL (ref 70–104)
HCO3 BLDA-SCNC: 26.1 MMOL/L (ref 22–26)
HCT VFR BLD AUTO: 32.8 % (ref 35–45)
HGB BLD-MCNC: 10.9 G/DL (ref 12–16)
HGB BLDA-MCNC: 11.8 G/DL (ref 12–16)
LYMPHOCYTES # BLD AUTO: 0.7 X10'3 (ref 1.1–4.8)
LYMPHOCYTES NFR BLD AUTO: 8.2 % (ref 21–51)
MAGNESIUM SERPL-MCNC: 2.1 MG/DL (ref 1.5–2.4)
MCH RBC QN AUTO: 30.5 PG (ref 27–31)
MCHC RBC AUTO-ENTMCNC: 33.2 G/DL (ref 33–36.5)
MCV RBC AUTO: 91.9 FL (ref 78–98)
METHGB MFR BLDA: 0 % (ref 0–1.5)
MONOCYTES # BLD AUTO: 0.1 X10'3 (ref 0–0.9)
MONOCYTES NFR BLD AUTO: 1.2 % (ref 2–12)
NEUTROPHILS # BLD AUTO: 7.2 X10'3 (ref 1.8–7.7)
NEUTROPHILS NFR BLD AUTO: 83.5 % (ref 42–75)
O2/TOTAL GAS SETTING VFR VENT: 100 MMHG/%
OXYHGB MFR BLDA: 93.7 % (ref 94–97)
PCO2 TEMP ADJ BLDA: 49.6 MMHG (ref 32–45)
PEEP RESPIRATORY: 18 CM H2O
PHOSPHATE SERPL-MCNC: 2 MG/DL (ref 2.3–4.5)
PLATELET # BLD AUTO: 204 X10'3 (ref 140–440)
PMV BLD AUTO: 9.6 FL (ref 7.4–10.4)
PO2 TEMP ADJ BLD: 76.5 MMHG (ref 75–100)
POTASSIUM SERPL-SCNC: 3.7 MMOL/L (ref 3.5–5.1)
PREALB SERPL-MCNC: 24.9 MG/DL (ref 19–36)
PROT SERPL-MCNC: 6 G/DL (ref 6.4–8.2)
RBC # BLD AUTO: 3.57 X10'6 (ref 4.2–5.6)
RESP RATE 1H: 26 B/MIN
SAO2 % BLDA: 94.4 % (ref 94–97)
SODIUM SERPL-SCNC: 143 MMOL/L (ref 135–145)
SPONT VT COMPRES GAS VOL SET UNCOR VENT: 400 ML
WBC # BLD AUTO: 8.6 X10'3 (ref 4.5–11)

## 2021-10-28 RX ADMIN — ENOXAPARIN SODIUM SCH MG: 100 INJECTION SUBCUTANEOUS at 07:45

## 2021-10-28 RX ADMIN — SODIUM CHLORIDE SCH MLS/HR: 9 INJECTION INTRAMUSCULAR; INTRAVENOUS; SUBCUTANEOUS at 07:44

## 2021-10-28 RX ADMIN — VANCOMYCIN HYDROCHLORIDE SCH MLS/HR: 750 INJECTION, POWDER, LYOPHILIZED, FOR SOLUTION INTRAVENOUS at 08:54

## 2021-10-28 RX ADMIN — Medication SCH MLS/HR: at 21:36

## 2021-10-28 RX ADMIN — ACETAMINOPHEN PRN MG: 650 SUPPOSITORY RECTAL at 13:45

## 2021-10-28 RX ADMIN — AMIODARONE HYDROCHLORIDE SCH MLS/HR: 1.8 INJECTION, SOLUTION INTRAVENOUS at 06:13

## 2021-10-28 RX ADMIN — DEXTROSE SCH ML: 5 SOLUTION INTRAVENOUS at 07:30

## 2021-10-28 RX ADMIN — DEXTROSE SCH MLS/HR: 50 INJECTION, SOLUTION INTRAVENOUS at 11:45

## 2021-10-28 RX ADMIN — Medication SCH MG: at 07:45

## 2021-10-28 RX ADMIN — AMIODARONE HYDROCHLORIDE SCH MLS/HR: 1.8 INJECTION, SOLUTION INTRAVENOUS at 09:03

## 2021-10-28 RX ADMIN — ENOXAPARIN SODIUM SCH MG: 100 INJECTION SUBCUTANEOUS at 21:32

## 2021-10-28 RX ADMIN — Medication PRN MLS/HR: at 00:51

## 2021-10-28 RX ADMIN — Medication SCH MLS/HR: at 21:34

## 2021-10-28 RX ADMIN — BUDESONIDE SCH MG: 0.5 INHALANT RESPIRATORY (INHALATION) at 22:51

## 2021-10-28 RX ADMIN — INSULIN GLARGINE SCH UNIT: 100 INJECTION, SOLUTION SUBCUTANEOUS at 22:15

## 2021-10-28 RX ADMIN — Medication PRN MLS/HR: at 23:04

## 2021-10-28 RX ADMIN — Medication PRN MLS/HR: at 15:22

## 2021-10-28 RX ADMIN — SODIUM CHLORIDE, SODIUM LACTATE, POTASSIUM CHLORIDE, AND CALCIUM CHLORIDE SCH MLS/HR: .6; .31; .03; .02 INJECTION, SOLUTION INTRAVENOUS at 21:31

## 2021-10-28 RX ADMIN — LEVALBUTEROL SCH MG: 0.63 SOLUTION RESPIRATORY (INHALATION) at 22:51

## 2021-10-28 RX ADMIN — Medication PRN MLS/HR: at 09:46

## 2021-10-28 RX ADMIN — DOCUSATE SODIUM SCH MG: 50 LIQUID ORAL at 20:00

## 2021-10-28 RX ADMIN — LEVALBUTEROL SCH MG: 0.63 SOLUTION RESPIRATORY (INHALATION) at 07:35

## 2021-10-28 RX ADMIN — Medication SCH MLS/HR: at 15:50

## 2021-10-28 RX ADMIN — VANCOMYCIN HYDROCHLORIDE SCH MLS/HR: 750 INJECTION, POWDER, LYOPHILIZED, FOR SOLUTION INTRAVENOUS at 16:00

## 2021-10-28 RX ADMIN — METHYLPREDNISOLONE SODIUM SUCCINATE SCH MG: 40 INJECTION, POWDER, FOR SOLUTION INTRAMUSCULAR; INTRAVENOUS at 07:46

## 2021-10-28 RX ADMIN — Medication PRN MLS/HR: at 05:23

## 2021-10-28 RX ADMIN — BUDESONIDE SCH MG: 0.5 INHALANT RESPIRATORY (INHALATION) at 07:34

## 2021-10-28 RX ADMIN — AMIODARONE HYDROCHLORIDE SCH MLS/HR: 1.8 INJECTION, SOLUTION INTRAVENOUS at 19:25

## 2021-10-28 RX ADMIN — DOCUSATE SODIUM SCH MG: 50 LIQUID ORAL at 08:00

## 2021-10-28 RX ADMIN — LEVALBUTEROL SCH MG: 0.63 SOLUTION RESPIRATORY (INHALATION) at 14:45

## 2021-10-28 RX ADMIN — DEXTROSE SCH ML: 5 SOLUTION INTRAVENOUS at 15:00

## 2021-10-28 RX ADMIN — Medication PRN MLS/HR: at 19:27

## 2021-10-28 RX ADMIN — INSULIN HUMAN SCH UNIT: 100 INJECTION, SOLUTION PARENTERAL at 14:22

## 2021-10-28 RX ADMIN — LEVALBUTEROL SCH MG: 0.63 SOLUTION RESPIRATORY (INHALATION) at 02:54

## 2021-10-28 RX ADMIN — INSULIN HUMAN SCH UNIT: 100 INJECTION, SOLUTION PARENTERAL at 22:11

## 2021-10-28 RX ADMIN — Medication SCH MLS/HR: at 14:24

## 2021-10-28 RX ADMIN — Medication SCH MLS/HR: at 09:47

## 2021-10-28 NOTE — NUR
Reassessment: Pt remains intubated. Noted TF has never been increased to 

new goal rate and pt documented to have 500 mL GRV at 20:00 on 10/27. F/u 

GRV were WNL at 250 mL and 10 mL. TC to RN who reports TF has been off 

since last night. RD recommended resuming TF given resolution in elevated 

GRVs. LBM 10/19. Pt has received multiple interventions for constipation 

and continues with routine Colace and PRN bowel care. Pt did receive one 

time dose of MoM 10/28. Will continue to follow closely.

Recommendations:

1) Continuous TF via OGT using Vital High Protein at 65 mL/hr goal rate to

provide 1560 mL total volume/day, 1560 kcal, 137 g protein, and 1304 mL

water; Adjust if Propofol is resumed

2) Additional 150 mL water flush Q4H; monitor serum Na and adjust

as appropriate

3) Prealbumin q Monday/Thursday; daily scaled weights

4) Routine bowel care; consider additional with 9 days constipation

5) DM education once stable following extubation, A1c 6.8% and no PMH of

DM in EMR. Pt will need official DM dx by physician prior to RD education

-------------------------------------------------------------------------------

Addendum: 10/28/21 at 1135 by Carmela Walsh RD

-------------------------------------------------------------------------------

Amended: Links added.

## 2021-10-28 NOTE — NUR
Problems reprioritized. Patient report given, questions answered & plan of care reviewed 
with Ness BARONE.

## 2021-10-28 NOTE — NUR
Late entry: 2315:  Placed patient in supine position with 6 person assist without incident.  
Patient tolerating position well. Abrasions noted on patient's L cheek, L upper lip and L 
upper thigh. Bleeding noted coming from abrasion on L upper lip. Oral care provided, mouth 
moisturizer applied. Oral secretions bloody or brown and foul smelling.   Hydrophilic 
dressing applied to L cheek and thigh abrasion.  See photos for further. Wound care consult 
ordered. Will continue to monitor.

## 2021-10-29 VITALS — DIASTOLIC BLOOD PRESSURE: 74 MMHG | SYSTOLIC BLOOD PRESSURE: 116 MMHG

## 2021-10-29 VITALS — SYSTOLIC BLOOD PRESSURE: 119 MMHG | DIASTOLIC BLOOD PRESSURE: 79 MMHG

## 2021-10-29 VITALS — SYSTOLIC BLOOD PRESSURE: 128 MMHG | DIASTOLIC BLOOD PRESSURE: 82 MMHG

## 2021-10-29 VITALS — SYSTOLIC BLOOD PRESSURE: 123 MMHG | DIASTOLIC BLOOD PRESSURE: 72 MMHG

## 2021-10-29 VITALS — SYSTOLIC BLOOD PRESSURE: 110 MMHG | DIASTOLIC BLOOD PRESSURE: 72 MMHG

## 2021-10-29 VITALS — SYSTOLIC BLOOD PRESSURE: 121 MMHG | DIASTOLIC BLOOD PRESSURE: 76 MMHG

## 2021-10-29 VITALS — SYSTOLIC BLOOD PRESSURE: 129 MMHG | DIASTOLIC BLOOD PRESSURE: 83 MMHG

## 2021-10-29 VITALS — SYSTOLIC BLOOD PRESSURE: 103 MMHG | DIASTOLIC BLOOD PRESSURE: 68 MMHG

## 2021-10-29 VITALS — SYSTOLIC BLOOD PRESSURE: 109 MMHG | DIASTOLIC BLOOD PRESSURE: 71 MMHG

## 2021-10-29 VITALS — SYSTOLIC BLOOD PRESSURE: 133 MMHG | DIASTOLIC BLOOD PRESSURE: 71 MMHG

## 2021-10-29 VITALS — SYSTOLIC BLOOD PRESSURE: 114 MMHG | DIASTOLIC BLOOD PRESSURE: 74 MMHG

## 2021-10-29 VITALS — DIASTOLIC BLOOD PRESSURE: 64 MMHG | SYSTOLIC BLOOD PRESSURE: 110 MMHG

## 2021-10-29 VITALS — DIASTOLIC BLOOD PRESSURE: 72 MMHG | SYSTOLIC BLOOD PRESSURE: 113 MMHG

## 2021-10-29 VITALS — SYSTOLIC BLOOD PRESSURE: 138 MMHG | DIASTOLIC BLOOD PRESSURE: 80 MMHG

## 2021-10-29 VITALS — DIASTOLIC BLOOD PRESSURE: 71 MMHG | SYSTOLIC BLOOD PRESSURE: 109 MMHG

## 2021-10-29 VITALS — DIASTOLIC BLOOD PRESSURE: 72 MMHG | SYSTOLIC BLOOD PRESSURE: 126 MMHG

## 2021-10-29 VITALS — SYSTOLIC BLOOD PRESSURE: 136 MMHG | DIASTOLIC BLOOD PRESSURE: 77 MMHG

## 2021-10-29 VITALS — SYSTOLIC BLOOD PRESSURE: 105 MMHG | DIASTOLIC BLOOD PRESSURE: 73 MMHG

## 2021-10-29 VITALS — DIASTOLIC BLOOD PRESSURE: 75 MMHG | SYSTOLIC BLOOD PRESSURE: 125 MMHG

## 2021-10-29 VITALS — DIASTOLIC BLOOD PRESSURE: 76 MMHG | SYSTOLIC BLOOD PRESSURE: 132 MMHG

## 2021-10-29 VITALS — SYSTOLIC BLOOD PRESSURE: 108 MMHG | DIASTOLIC BLOOD PRESSURE: 74 MMHG

## 2021-10-29 VITALS — SYSTOLIC BLOOD PRESSURE: 119 MMHG | DIASTOLIC BLOOD PRESSURE: 72 MMHG

## 2021-10-29 VITALS — SYSTOLIC BLOOD PRESSURE: 113 MMHG | DIASTOLIC BLOOD PRESSURE: 74 MMHG

## 2021-10-29 VITALS — DIASTOLIC BLOOD PRESSURE: 76 MMHG | SYSTOLIC BLOOD PRESSURE: 117 MMHG

## 2021-10-29 VITALS — SYSTOLIC BLOOD PRESSURE: 113 MMHG | DIASTOLIC BLOOD PRESSURE: 70 MMHG

## 2021-10-29 VITALS — SYSTOLIC BLOOD PRESSURE: 110 MMHG | DIASTOLIC BLOOD PRESSURE: 75 MMHG

## 2021-10-29 VITALS — SYSTOLIC BLOOD PRESSURE: 112 MMHG | DIASTOLIC BLOOD PRESSURE: 76 MMHG

## 2021-10-29 VITALS — SYSTOLIC BLOOD PRESSURE: 123 MMHG | DIASTOLIC BLOOD PRESSURE: 77 MMHG

## 2021-10-29 VITALS — SYSTOLIC BLOOD PRESSURE: 124 MMHG | DIASTOLIC BLOOD PRESSURE: 80 MMHG

## 2021-10-29 VITALS — SYSTOLIC BLOOD PRESSURE: 122 MMHG | DIASTOLIC BLOOD PRESSURE: 83 MMHG

## 2021-10-29 VITALS — DIASTOLIC BLOOD PRESSURE: 72 MMHG | SYSTOLIC BLOOD PRESSURE: 125 MMHG

## 2021-10-29 VITALS — DIASTOLIC BLOOD PRESSURE: 82 MMHG | SYSTOLIC BLOOD PRESSURE: 128 MMHG

## 2021-10-29 VITALS — DIASTOLIC BLOOD PRESSURE: 73 MMHG | SYSTOLIC BLOOD PRESSURE: 135 MMHG

## 2021-10-29 LAB
ALBUMIN SERPL BCP-MCNC: 2.5 G/DL (ref 3.4–5)
ALBUMIN/GLOB SERPL: 0.7 {RATIO} (ref 1.1–1.5)
ALP SERPL-CCNC: 165 IU/L (ref 46–116)
ALT SERPL W P-5'-P-CCNC: 1159 U/L (ref 12–78)
ANION GAP SERPL CALCULATED.3IONS-SCNC: 7 MMOL/L (ref 8–16)
AST SERPL W P-5'-P-CCNC: 830 U/L (ref 10–37)
BACTERIA URNS QL MICRO: (no result) /HPF
BASE EXCESS BLDA CALC-SCNC: -1.6 MMOL/L (ref -2–2)
BASOPHILS # BLD AUTO: 0 X10'3 (ref 0–0.2)
BASOPHILS NFR BLD AUTO: 0.5 % (ref 0–1)
BILIRUB SERPL-MCNC: 0.3 MG/DL (ref 0.1–1)
BODY TEMPERATURE: 38.9
BUN SERPL-MCNC: 19 MG/DL (ref 7–18)
BUN/CREAT SERPL: 24.4 (ref 6.6–38)
CALCIUM SERPL-MCNC: 7.6 MG/DL (ref 8.5–10.1)
CHLORIDE SERPL-SCNC: 114 MMOL/L (ref 99–107)
CLARITY UR: (no result)
CO2 SERPL-SCNC: 26.4 MMOL/L (ref 24–32)
COARSE GRAN CASTS URNS QL MICRO: (no result) /LPF
COHGB MFR BLDA: 0.3 % (ref 0–3.9)
COLOR UR: (no result)
CREAT SERPL-MCNC: 0.78 MG/DL (ref 0.4–0.9)
DEPRECATED SQUAMOUS URNS QL MICRO: (no result) /LPF
EOSINOPHIL # BLD AUTO: 0.2 X10'3 (ref 0–0.9)
EOSINOPHIL NFR BLD AUTO: 2.1 % (ref 0–6)
EOSINOPHIL NFR BLD MANUAL: 4 % (ref 0–6)
ERYTHROCYTE [DISTWIDTH] IN BLOOD BY AUTOMATED COUNT: 15.9 % (ref 11.5–14.5)
GFR SERPL CREATININE-BSD FRML MDRD: 76 ML/MIN
GLUCOSE SERPL-MCNC: 149 MG/DL (ref 70–104)
GLUCOSE UR STRIP-MCNC: NEGATIVE MG/DL
HCO3 BLDA-SCNC: 23.1 MMOL/L (ref 22–26)
HCT VFR BLD AUTO: 31 % (ref 35–45)
HGB BLD-MCNC: 10.3 G/DL (ref 12–16)
HGB BLDA-MCNC: 11 G/DL (ref 12–16)
HGB UR QL STRIP: (no result)
KETONES UR STRIP-MCNC: NEGATIVE MG/DL
LEUKOCYTE ESTERASE UR QL STRIP: NEGATIVE
LYMPHOCYTES # BLD AUTO: 1.1 X10'3 (ref 1.1–4.8)
LYMPHOCYTES NFR BLD AUTO: 15.9 % (ref 21–51)
LYMPHOCYTES NFR BLD MANUAL: 14 % (ref 21–51)
MAGNESIUM SERPL-MCNC: 2.2 MG/DL (ref 1.5–2.4)
MCH RBC QN AUTO: 30.7 PG (ref 27–31)
MCHC RBC AUTO-ENTMCNC: 33.3 G/DL (ref 33–36.5)
MCV RBC AUTO: 92.3 FL (ref 78–98)
METHGB MFR BLDA: 0 % (ref 0–1.5)
MONOCYTES # BLD AUTO: 0.1 X10'3 (ref 0–0.9)
MONOCYTES NFR BLD AUTO: 1.8 % (ref 2–12)
MONOCYTES NFR BLD MANUAL: 1 % (ref 2–12)
MUCOUS THREADS URNS QL MICRO: (no result) /LPF
NEUTROPHILS # BLD AUTO: 5.7 X10'3 (ref 1.8–7.7)
NEUTROPHILS NFR BLD AUTO: 79.7 % (ref 42–75)
NEUTS BAND # BLD MANUAL: 5 % (ref 0–10)
NEUTS SEG NFR BLD MANUAL: 76 % (ref 42–75)
NITRITE UR QL STRIP: NEGATIVE
NRBC BLD MANUAL-RTO: 7 /100WBC (ref 0–0)
O2/TOTAL GAS SETTING VFR VENT: 100 MMHG/%
OXYHGB MFR BLDA: 94.9 % (ref 94–97)
PCO2 TEMP ADJ BLDA: 42.2 MMHG (ref 32–45)
PEEP RESPIRATORY: 18 CM H2O
PH UR STRIP: 6 [PH] (ref 4.8–8)
PHOSPHATE SERPL-MCNC: 1.8 MG/DL (ref 2.3–4.5)
PLATELET # BLD AUTO: 217 X10'3 (ref 140–440)
PLATELET BLD QL SMEAR: NORMAL
PMV BLD AUTO: 9.5 FL (ref 7.4–10.4)
PO2 TEMP ADJ BLD: 85.1 MMHG (ref 75–100)
POLYCHROMASIA BLD QL SMEAR: (no result)
POTASSIUM SERPL-SCNC: 4 MMOL/L (ref 3.5–5.1)
PROT SERPL-MCNC: 6.3 G/DL (ref 6.4–8.2)
PROT UR QL STRIP: (no result) MG/DL
RBC # BLD AUTO: 3.36 X10'6 (ref 4.2–5.6)
RBC #/AREA URNS HPF: (no result) /HPF (ref 0–2)
RBC MORPH BLD: (no result)
RESP RATE 1H: 26 B/MIN
SAO2 % BLDA: 95.2 % (ref 94–97)
SODIUM SERPL-SCNC: 147 MMOL/L (ref 135–145)
SP GR UR STRIP: 1.01 (ref 1–1.03)
SPONT VT COMPRES GAS VOL SET UNCOR VENT: 400 ML
TOTAL CELLS COUNTED FLD: 100
TRANS CELLS URNS QL MICRO: (no result) /HPF
URN COLLECT METHOD CLASS: (no result)
UROBILINOGEN UR STRIP-MCNC: 0.2 E.U/DL (ref 0.2–1)
VANCOMYCIN SERPL-MCNC: 14.8 UG/ML (ref 6–14)
WBC # BLD AUTO: 7.2 X10'3 (ref 4.5–11)
WBC #/AREA URNS HPF: (no result) /HPF (ref 0–4)

## 2021-10-29 RX ADMIN — LEVALBUTEROL SCH MG: 0.63 SOLUTION RESPIRATORY (INHALATION) at 15:20

## 2021-10-29 RX ADMIN — INSULIN GLARGINE SCH UNIT: 100 INJECTION, SOLUTION SUBCUTANEOUS at 22:04

## 2021-10-29 RX ADMIN — SODIUM CHLORIDE, SODIUM LACTATE, POTASSIUM CHLORIDE, AND CALCIUM CHLORIDE SCH MLS/HR: .6; .31; .03; .02 INJECTION, SOLUTION INTRAVENOUS at 02:41

## 2021-10-29 RX ADMIN — Medication PRN MLS/HR: at 09:33

## 2021-10-29 RX ADMIN — Medication PRN MLS/HR: at 21:29

## 2021-10-29 RX ADMIN — INSULIN HUMAN SCH UNIT: 100 INJECTION, SOLUTION PARENTERAL at 22:02

## 2021-10-29 RX ADMIN — LEVALBUTEROL SCH MG: 0.63 SOLUTION RESPIRATORY (INHALATION) at 02:50

## 2021-10-29 RX ADMIN — LEVALBUTEROL SCH MG: 0.63 SOLUTION RESPIRATORY (INHALATION) at 23:10

## 2021-10-29 RX ADMIN — Medication PRN MLS/HR: at 17:24

## 2021-10-29 RX ADMIN — DEXTROSE SCH ML: 5 SOLUTION INTRAVENOUS at 23:00

## 2021-10-29 RX ADMIN — VANCOMYCIN HYDROCHLORIDE SCH MLS/HR: 750 INJECTION, POWDER, LYOPHILIZED, FOR SOLUTION INTRAVENOUS at 16:16

## 2021-10-29 RX ADMIN — METHYLPREDNISOLONE SODIUM SUCCINATE SCH MG: 40 INJECTION, POWDER, FOR SOLUTION INTRAMUSCULAR; INTRAVENOUS at 08:16

## 2021-10-29 RX ADMIN — BUDESONIDE SCH MG: 0.5 INHALANT RESPIRATORY (INHALATION) at 08:19

## 2021-10-29 RX ADMIN — LEVALBUTEROL SCH MG: 0.63 SOLUTION RESPIRATORY (INHALATION) at 08:19

## 2021-10-29 RX ADMIN — INSULIN HUMAN SCH UNIT: 100 INJECTION, SOLUTION PARENTERAL at 09:32

## 2021-10-29 RX ADMIN — ENOXAPARIN SODIUM SCH MG: 100 INJECTION SUBCUTANEOUS at 08:17

## 2021-10-29 RX ADMIN — METHYLPREDNISOLONE SODIUM SUCCINATE SCH MG: 40 INJECTION, POWDER, FOR SOLUTION INTRAMUSCULAR; INTRAVENOUS at 22:09

## 2021-10-29 RX ADMIN — BUDESONIDE SCH MG: 0.5 INHALANT RESPIRATORY (INHALATION) at 23:10

## 2021-10-29 RX ADMIN — SODIUM CHLORIDE, SODIUM LACTATE, POTASSIUM CHLORIDE, AND CALCIUM CHLORIDE SCH MLS/HR: .6; .31; .03; .02 INJECTION, SOLUTION INTRAVENOUS at 22:41

## 2021-10-29 RX ADMIN — DEXTROSE SCH ML: 5 SOLUTION INTRAVENOUS at 15:15

## 2021-10-29 RX ADMIN — DEXTROSE SCH ML: 5 SOLUTION INTRAVENOUS at 07:00

## 2021-10-29 RX ADMIN — INSULIN HUMAN SCH UNIT: 100 INJECTION, SOLUTION PARENTERAL at 14:33

## 2021-10-29 RX ADMIN — VANCOMYCIN HYDROCHLORIDE SCH MLS/HR: 750 INJECTION, POWDER, LYOPHILIZED, FOR SOLUTION INTRAVENOUS at 16:15

## 2021-10-29 RX ADMIN — AMIODARONE HYDROCHLORIDE SCH MLS/HR: 1.8 INJECTION, SOLUTION INTRAVENOUS at 15:18

## 2021-10-29 RX ADMIN — DOCUSATE SODIUM SCH MG: 50 LIQUID ORAL at 08:17

## 2021-10-29 RX ADMIN — Medication SCH MLS/HR: at 16:43

## 2021-10-29 RX ADMIN — Medication PRN MLS/HR: at 02:42

## 2021-10-29 RX ADMIN — ENOXAPARIN SODIUM SCH MG: 100 INJECTION SUBCUTANEOUS at 22:08

## 2021-10-29 RX ADMIN — AMIODARONE HYDROCHLORIDE SCH MLS/HR: 1.8 INJECTION, SOLUTION INTRAVENOUS at 18:37

## 2021-10-29 RX ADMIN — Medication SCH MG: at 08:17

## 2021-10-29 RX ADMIN — Medication SCH MLS/HR: at 10:53

## 2021-10-29 RX ADMIN — VANCOMYCIN HYDROCHLORIDE SCH MLS/HR: 750 INJECTION, POWDER, LYOPHILIZED, FOR SOLUTION INTRAVENOUS at 08:16

## 2021-10-29 RX ADMIN — Medication PRN MLS/HR: at 13:31

## 2021-10-29 RX ADMIN — Medication SCH MLS/HR: at 14:57

## 2021-10-29 RX ADMIN — DOCUSATE SODIUM SCH MG: 50 LIQUID ORAL at 20:00

## 2021-10-30 VITALS — SYSTOLIC BLOOD PRESSURE: 144 MMHG | DIASTOLIC BLOOD PRESSURE: 84 MMHG

## 2021-10-30 VITALS — SYSTOLIC BLOOD PRESSURE: 134 MMHG | DIASTOLIC BLOOD PRESSURE: 79 MMHG

## 2021-10-30 VITALS — DIASTOLIC BLOOD PRESSURE: 65 MMHG | SYSTOLIC BLOOD PRESSURE: 99 MMHG

## 2021-10-30 VITALS — DIASTOLIC BLOOD PRESSURE: 74 MMHG | SYSTOLIC BLOOD PRESSURE: 128 MMHG

## 2021-10-30 VITALS — DIASTOLIC BLOOD PRESSURE: 77 MMHG | SYSTOLIC BLOOD PRESSURE: 135 MMHG

## 2021-10-30 VITALS — SYSTOLIC BLOOD PRESSURE: 118 MMHG | DIASTOLIC BLOOD PRESSURE: 70 MMHG

## 2021-10-30 VITALS — SYSTOLIC BLOOD PRESSURE: 107 MMHG | DIASTOLIC BLOOD PRESSURE: 67 MMHG

## 2021-10-30 VITALS — SYSTOLIC BLOOD PRESSURE: 145 MMHG | DIASTOLIC BLOOD PRESSURE: 84 MMHG

## 2021-10-30 VITALS — SYSTOLIC BLOOD PRESSURE: 123 MMHG | DIASTOLIC BLOOD PRESSURE: 78 MMHG

## 2021-10-30 VITALS — DIASTOLIC BLOOD PRESSURE: 68 MMHG | SYSTOLIC BLOOD PRESSURE: 112 MMHG

## 2021-10-30 VITALS — DIASTOLIC BLOOD PRESSURE: 80 MMHG | SYSTOLIC BLOOD PRESSURE: 138 MMHG

## 2021-10-30 VITALS — SYSTOLIC BLOOD PRESSURE: 106 MMHG | DIASTOLIC BLOOD PRESSURE: 64 MMHG

## 2021-10-30 VITALS — SYSTOLIC BLOOD PRESSURE: 108 MMHG | DIASTOLIC BLOOD PRESSURE: 69 MMHG

## 2021-10-30 VITALS — SYSTOLIC BLOOD PRESSURE: 133 MMHG | DIASTOLIC BLOOD PRESSURE: 82 MMHG

## 2021-10-30 VITALS — DIASTOLIC BLOOD PRESSURE: 86 MMHG | SYSTOLIC BLOOD PRESSURE: 133 MMHG

## 2021-10-30 VITALS — SYSTOLIC BLOOD PRESSURE: 110 MMHG | DIASTOLIC BLOOD PRESSURE: 68 MMHG

## 2021-10-30 VITALS — DIASTOLIC BLOOD PRESSURE: 74 MMHG | SYSTOLIC BLOOD PRESSURE: 131 MMHG

## 2021-10-30 VITALS — DIASTOLIC BLOOD PRESSURE: 69 MMHG | SYSTOLIC BLOOD PRESSURE: 117 MMHG

## 2021-10-30 VITALS — DIASTOLIC BLOOD PRESSURE: 60 MMHG | SYSTOLIC BLOOD PRESSURE: 106 MMHG

## 2021-10-30 VITALS — DIASTOLIC BLOOD PRESSURE: 83 MMHG | SYSTOLIC BLOOD PRESSURE: 141 MMHG

## 2021-10-30 VITALS — DIASTOLIC BLOOD PRESSURE: 66 MMHG | SYSTOLIC BLOOD PRESSURE: 112 MMHG

## 2021-10-30 VITALS — SYSTOLIC BLOOD PRESSURE: 148 MMHG | DIASTOLIC BLOOD PRESSURE: 84 MMHG

## 2021-10-30 VITALS — DIASTOLIC BLOOD PRESSURE: 67 MMHG | SYSTOLIC BLOOD PRESSURE: 118 MMHG

## 2021-10-30 VITALS — DIASTOLIC BLOOD PRESSURE: 74 MMHG | SYSTOLIC BLOOD PRESSURE: 129 MMHG

## 2021-10-30 LAB
ALBUMIN SERPL BCP-MCNC: 2.8 G/DL (ref 3.4–5)
ALBUMIN/GLOB SERPL: 0.8 {RATIO} (ref 1.1–1.5)
ALP SERPL-CCNC: 121 IU/L (ref 46–116)
ALT SERPL W P-5'-P-CCNC: 931 U/L (ref 12–78)
ANION GAP SERPL CALCULATED.3IONS-SCNC: 4 MMOL/L (ref 8–16)
ANISOCYTOSIS BLD QL SMEAR: (no result)
ARTERIAL PATENCY WRIST A: POSITIVE
AST SERPL W P-5'-P-CCNC: 343 U/L (ref 10–37)
BASE EXCESS BLDA CALC-SCNC: 0.3 MMOL/L (ref -2–2)
BASOPHILS # BLD AUTO: 0 X10'3 (ref 0–0.2)
BASOPHILS NFR BLD AUTO: 0.3 % (ref 0–1)
BILIRUB SERPL-MCNC: 0.3 MG/DL (ref 0.1–1)
BODY TEMPERATURE: 39.9
BUN SERPL-MCNC: 21 MG/DL (ref 7–18)
BUN/CREAT SERPL: 27.6 (ref 6.6–38)
CALCIUM SERPL-MCNC: 7.9 MG/DL (ref 8.5–10.1)
CHLORIDE SERPL-SCNC: 114 MMOL/L (ref 99–107)
CO2 SERPL-SCNC: 28.8 MMOL/L (ref 24–32)
COHGB MFR BLDA: 0.9 % (ref 0–3.9)
CREAT SERPL-MCNC: 0.76 MG/DL (ref 0.4–0.9)
EOSINOPHIL # BLD AUTO: 0 X10'3 (ref 0–0.9)
EOSINOPHIL NFR BLD AUTO: 0.1 % (ref 0–6)
ERYTHROCYTE [DISTWIDTH] IN BLOOD BY AUTOMATED COUNT: 16.2 % (ref 11.5–14.5)
GFR SERPL CREATININE-BSD FRML MDRD: 79 ML/MIN
GLUCOSE SERPL-MCNC: 243 MG/DL (ref 70–104)
HCO3 BLDA-SCNC: 26.4 MMOL/L (ref 22–26)
HCT VFR BLD AUTO: 30 % (ref 35–45)
HGB BLD-MCNC: 9.9 G/DL (ref 12–16)
HGB BLDA-MCNC: 11 G/DL (ref 12–16)
LYMPHOCYTES # BLD AUTO: 0.7 X10'3 (ref 1.1–4.8)
LYMPHOCYTES NFR BLD AUTO: 10.4 % (ref 21–51)
LYMPHOCYTES NFR BLD MANUAL: 10 % (ref 21–51)
MAGNESIUM SERPL-MCNC: 2.4 MG/DL (ref 1.5–2.4)
MCH RBC QN AUTO: 30.7 PG (ref 27–31)
MCHC RBC AUTO-ENTMCNC: 32.9 G/DL (ref 33–36.5)
MCV RBC AUTO: 93.2 FL (ref 78–98)
METAMYELOCYTES NFR BLD MANUAL: 1 % (ref 0–0)
METHGB MFR BLDA: 0.2 % (ref 0–1.5)
MONOCYTES # BLD AUTO: 0.2 X10'3 (ref 0–0.9)
MONOCYTES NFR BLD AUTO: 2.6 % (ref 2–12)
MONOCYTES NFR BLD MANUAL: 2 % (ref 2–12)
MYELOCYTES NFR BLD MANUAL: 1 % (ref 0–0)
NEUTROPHILS # BLD AUTO: 5.8 X10'3 (ref 1.8–7.7)
NEUTROPHILS NFR BLD AUTO: 86.6 % (ref 42–75)
NEUTS BAND # BLD MANUAL: 8 % (ref 0–10)
NEUTS SEG NFR BLD MANUAL: 76 % (ref 42–75)
NRBC BLD MANUAL-RTO: 6 /100WBC (ref 0–0)
O2/TOTAL GAS SETTING VFR VENT: 100 MMHG/%
OXYHGB MFR BLDA: 93.6 % (ref 94–97)
PCO2 TEMP ADJ BLDA: 55.6 MMHG (ref 32–45)
PEEP RESPIRATORY: 18 CM H2O
PHOSPHATE SERPL-MCNC: 1.1 MG/DL (ref 2.3–4.5)
PLATELET # BLD AUTO: 174 X10'3 (ref 140–440)
PLATELET BLD QL SMEAR: NORMAL
PMV BLD AUTO: 9.4 FL (ref 7.4–10.4)
PO2 TEMP ADJ BLD: 89.4 MMHG (ref 75–100)
POLYCHROMASIA BLD QL SMEAR: (no result)
POTASSIUM SERPL-SCNC: 4.1 MMOL/L (ref 3.5–5.1)
PROT SERPL-MCNC: 6.5 G/DL (ref 6.4–8.2)
RBC # BLD AUTO: 3.22 X10'6 (ref 4.2–5.6)
RBC MORPH BLD: (no result)
RESP RATE 1H: 26 B/MIN
SAO2 % BLDA: 94.6 % (ref 94–97)
SODIUM SERPL-SCNC: 147 MMOL/L (ref 135–145)
SPONT VT COMPRES GAS VOL SET UNCOR VENT: 400 ML
TOTAL CELLS COUNTED FLD: 100
VARIANT LYMPHS NFR BLD MANUAL: 2 % (ref 0–0)
WBC # BLD AUTO: 6.6 X10'3 (ref 4.5–11)

## 2021-10-30 RX ADMIN — INSULIN GLARGINE SCH UNIT: 100 INJECTION, SOLUTION SUBCUTANEOUS at 20:52

## 2021-10-30 RX ADMIN — ENOXAPARIN SODIUM SCH MG: 100 INJECTION SUBCUTANEOUS at 08:00

## 2021-10-30 RX ADMIN — LEVALBUTEROL SCH MG: 0.63 SOLUTION RESPIRATORY (INHALATION) at 15:33

## 2021-10-30 RX ADMIN — AMIODARONE HYDROCHLORIDE SCH MLS/HR: 1.8 INJECTION, SOLUTION INTRAVENOUS at 05:55

## 2021-10-30 RX ADMIN — VANCOMYCIN HYDROCHLORIDE SCH MLS/HR: 750 INJECTION, POWDER, LYOPHILIZED, FOR SOLUTION INTRAVENOUS at 11:02

## 2021-10-30 RX ADMIN — DEXTROSE SCH ML: 5 SOLUTION INTRAVENOUS at 07:00

## 2021-10-30 RX ADMIN — ENOXAPARIN SODIUM SCH MG: 100 INJECTION SUBCUTANEOUS at 21:26

## 2021-10-30 RX ADMIN — Medication PRN MLS/HR: at 20:03

## 2021-10-30 RX ADMIN — LEVALBUTEROL SCH MG: 0.63 SOLUTION RESPIRATORY (INHALATION) at 08:45

## 2021-10-30 RX ADMIN — INSULIN HUMAN SCH UNIT: 100 INJECTION, SOLUTION PARENTERAL at 20:50

## 2021-10-30 RX ADMIN — LEVALBUTEROL SCH MG: 0.63 SOLUTION RESPIRATORY (INHALATION) at 02:45

## 2021-10-30 RX ADMIN — Medication SCH MLS/HR: at 16:37

## 2021-10-30 RX ADMIN — DOCUSATE SODIUM SCH MG: 50 LIQUID ORAL at 20:00

## 2021-10-30 RX ADMIN — Medication PRN MLS/HR: at 10:13

## 2021-10-30 RX ADMIN — Medication SCH MLS/HR: at 00:56

## 2021-10-30 RX ADMIN — INSULIN HUMAN SCH UNIT: 100 INJECTION, SOLUTION PARENTERAL at 10:11

## 2021-10-30 RX ADMIN — AMIODARONE HYDROCHLORIDE SCH MLS/HR: 1.8 INJECTION, SOLUTION INTRAVENOUS at 12:49

## 2021-10-30 RX ADMIN — Medication PRN MLS/HR: at 05:55

## 2021-10-30 RX ADMIN — CEFEPIME HYDROCHLORIDE SCH MLS/HR: 1 INJECTION, SOLUTION INTRAVENOUS at 14:55

## 2021-10-30 RX ADMIN — INSULIN HUMAN SCH UNIT: 100 INJECTION, SOLUTION PARENTERAL at 15:23

## 2021-10-30 RX ADMIN — METHYLPREDNISOLONE SODIUM SUCCINATE SCH MG: 40 INJECTION, POWDER, FOR SOLUTION INTRAMUSCULAR; INTRAVENOUS at 11:02

## 2021-10-30 RX ADMIN — LEVALBUTEROL SCH MG: 0.63 SOLUTION RESPIRATORY (INHALATION) at 21:00

## 2021-10-30 RX ADMIN — VANCOMYCIN HYDROCHLORIDE SCH MLS/HR: 750 INJECTION, POWDER, LYOPHILIZED, FOR SOLUTION INTRAVENOUS at 00:53

## 2021-10-30 RX ADMIN — BUDESONIDE SCH MG: 0.5 INHALANT RESPIRATORY (INHALATION) at 08:45

## 2021-10-30 RX ADMIN — Medication PRN MLS/HR: at 15:27

## 2021-10-30 RX ADMIN — AMIODARONE HYDROCHLORIDE SCH MLS/HR: 1.8 INJECTION, SOLUTION INTRAVENOUS at 18:53

## 2021-10-30 RX ADMIN — Medication SCH MG: at 11:03

## 2021-10-30 RX ADMIN — METHYLPREDNISOLONE SODIUM SUCCINATE SCH MG: 40 INJECTION, POWDER, FOR SOLUTION INTRAMUSCULAR; INTRAVENOUS at 21:27

## 2021-10-30 RX ADMIN — CEFEPIME HYDROCHLORIDE SCH MLS/HR: 1 INJECTION, SOLUTION INTRAVENOUS at 21:27

## 2021-10-30 RX ADMIN — AMIODARONE HYDROCHLORIDE SCH MLS/HR: 1.8 INJECTION, SOLUTION INTRAVENOUS at 06:45

## 2021-10-30 RX ADMIN — DEXTROSE SCH MLS/HR: 50 INJECTION, SOLUTION INTRAVENOUS at 11:45

## 2021-10-30 RX ADMIN — Medication SCH MLS/HR: at 06:58

## 2021-10-30 RX ADMIN — AMIODARONE HYDROCHLORIDE SCH MLS/HR: 1.8 INJECTION, SOLUTION INTRAVENOUS at 15:43

## 2021-10-30 RX ADMIN — SODIUM CHLORIDE, SODIUM LACTATE, POTASSIUM CHLORIDE, AND CALCIUM CHLORIDE SCH MLS/HR: .6; .31; .03; .02 INJECTION, SOLUTION INTRAVENOUS at 18:41

## 2021-10-30 RX ADMIN — Medication SCH MLS/HR: at 21:24

## 2021-10-30 RX ADMIN — BUDESONIDE SCH MG: 0.5 INHALANT RESPIRATORY (INHALATION) at 22:12

## 2021-10-30 RX ADMIN — AMIODARONE HYDROCHLORIDE SCH MLS/HR: 1.8 INJECTION, SOLUTION INTRAVENOUS at 00:41

## 2021-10-30 RX ADMIN — DOCUSATE SODIUM SCH MG: 50 LIQUID ORAL at 08:00

## 2021-10-30 RX ADMIN — Medication SCH MLS/HR: at 00:54

## 2021-10-31 VITALS — SYSTOLIC BLOOD PRESSURE: 113 MMHG | DIASTOLIC BLOOD PRESSURE: 66 MMHG

## 2021-10-31 VITALS — SYSTOLIC BLOOD PRESSURE: 107 MMHG | DIASTOLIC BLOOD PRESSURE: 65 MMHG

## 2021-10-31 VITALS — DIASTOLIC BLOOD PRESSURE: 73 MMHG | SYSTOLIC BLOOD PRESSURE: 125 MMHG

## 2021-10-31 VITALS — DIASTOLIC BLOOD PRESSURE: 63 MMHG | SYSTOLIC BLOOD PRESSURE: 110 MMHG

## 2021-10-31 VITALS — SYSTOLIC BLOOD PRESSURE: 120 MMHG | DIASTOLIC BLOOD PRESSURE: 65 MMHG

## 2021-10-31 VITALS — DIASTOLIC BLOOD PRESSURE: 64 MMHG | SYSTOLIC BLOOD PRESSURE: 107 MMHG

## 2021-10-31 VITALS — DIASTOLIC BLOOD PRESSURE: 66 MMHG | SYSTOLIC BLOOD PRESSURE: 116 MMHG

## 2021-10-31 VITALS — DIASTOLIC BLOOD PRESSURE: 77 MMHG | SYSTOLIC BLOOD PRESSURE: 121 MMHG

## 2021-10-31 VITALS — SYSTOLIC BLOOD PRESSURE: 112 MMHG | DIASTOLIC BLOOD PRESSURE: 71 MMHG

## 2021-10-31 VITALS — DIASTOLIC BLOOD PRESSURE: 67 MMHG | SYSTOLIC BLOOD PRESSURE: 114 MMHG

## 2021-10-31 VITALS — SYSTOLIC BLOOD PRESSURE: 96 MMHG | DIASTOLIC BLOOD PRESSURE: 56 MMHG

## 2021-10-31 VITALS — SYSTOLIC BLOOD PRESSURE: 110 MMHG | DIASTOLIC BLOOD PRESSURE: 69 MMHG

## 2021-10-31 VITALS — DIASTOLIC BLOOD PRESSURE: 66 MMHG | SYSTOLIC BLOOD PRESSURE: 115 MMHG

## 2021-10-31 VITALS — DIASTOLIC BLOOD PRESSURE: 61 MMHG | SYSTOLIC BLOOD PRESSURE: 111 MMHG

## 2021-10-31 VITALS — DIASTOLIC BLOOD PRESSURE: 63 MMHG | SYSTOLIC BLOOD PRESSURE: 103 MMHG

## 2021-10-31 VITALS — DIASTOLIC BLOOD PRESSURE: 63 MMHG | SYSTOLIC BLOOD PRESSURE: 105 MMHG

## 2021-10-31 VITALS — SYSTOLIC BLOOD PRESSURE: 109 MMHG | DIASTOLIC BLOOD PRESSURE: 65 MMHG

## 2021-10-31 VITALS — SYSTOLIC BLOOD PRESSURE: 105 MMHG | DIASTOLIC BLOOD PRESSURE: 63 MMHG

## 2021-10-31 VITALS — SYSTOLIC BLOOD PRESSURE: 102 MMHG | DIASTOLIC BLOOD PRESSURE: 72 MMHG

## 2021-10-31 VITALS — DIASTOLIC BLOOD PRESSURE: 70 MMHG | SYSTOLIC BLOOD PRESSURE: 120 MMHG

## 2021-10-31 VITALS — SYSTOLIC BLOOD PRESSURE: 103 MMHG | DIASTOLIC BLOOD PRESSURE: 59 MMHG

## 2021-10-31 VITALS — SYSTOLIC BLOOD PRESSURE: 104 MMHG | DIASTOLIC BLOOD PRESSURE: 61 MMHG

## 2021-10-31 VITALS — SYSTOLIC BLOOD PRESSURE: 112 MMHG | DIASTOLIC BLOOD PRESSURE: 68 MMHG

## 2021-10-31 VITALS — SYSTOLIC BLOOD PRESSURE: 113 MMHG | DIASTOLIC BLOOD PRESSURE: 68 MMHG

## 2021-10-31 LAB
ALBUMIN SERPL BCP-MCNC: 2.5 G/DL (ref 3.4–5)
ALBUMIN/GLOB SERPL: 0.7 {RATIO} (ref 1.1–1.5)
ALP SERPL-CCNC: 93 IU/L (ref 46–116)
ALT SERPL W P-5'-P-CCNC: 735 U/L (ref 12–78)
ANION GAP SERPL CALCULATED.3IONS-SCNC: 5 MMOL/L (ref 8–16)
ANISOCYTOSIS BLD QL SMEAR: (no result)
ARTERIAL PATENCY WRIST A: POSITIVE
AST SERPL W P-5'-P-CCNC: 144 U/L (ref 10–37)
BASE EXCESS BLDA CALC-SCNC: 4.3 MMOL/L (ref -2–2)
BASOPHILS # BLD AUTO: 0 X10'3 (ref 0–0.2)
BASOPHILS NFR BLD AUTO: 0.3 % (ref 0–1)
BILIRUB SERPL-MCNC: 0.4 MG/DL (ref 0.1–1)
BODY TEMPERATURE: 38.2
BUN SERPL-MCNC: 27 MG/DL (ref 7–18)
BUN/CREAT SERPL: 40.3 (ref 6.6–38)
CALCIUM SERPL-MCNC: 7.7 MG/DL (ref 8.5–10.1)
CHLORIDE SERPL-SCNC: 112 MMOL/L (ref 99–107)
CO2 SERPL-SCNC: 30.6 MMOL/L (ref 24–32)
COHGB MFR BLDA: 0.8 % (ref 0–3.9)
CREAT SERPL-MCNC: 0.67 MG/DL (ref 0.4–0.9)
D DIMER PPP FEU-MCNC: 0.82 MG/L FEU (ref 0–0.5)
DACRYOCYTES BLD QL SMEAR: (no result)
EOSINOPHIL # BLD AUTO: 0 X10'3 (ref 0–0.9)
EOSINOPHIL NFR BLD AUTO: 0 % (ref 0–6)
ERYTHROCYTE [DISTWIDTH] IN BLOOD BY AUTOMATED COUNT: 16.3 % (ref 11.5–14.5)
GFR SERPL CREATININE-BSD FRML MDRD: > 90 ML/MIN
GLUCOSE SERPL-MCNC: 165 MG/DL (ref 70–104)
HCO3 BLDA-SCNC: 28.8 MMOL/L (ref 22–26)
HCT VFR BLD AUTO: 28.6 % (ref 35–45)
HGB BLD-MCNC: 9.4 G/DL (ref 12–16)
HGB BLDA-MCNC: 10.4 G/DL (ref 12–16)
LYMPHOCYTES # BLD AUTO: 0.7 X10'3 (ref 1.1–4.8)
LYMPHOCYTES NFR BLD AUTO: 10.1 % (ref 21–51)
LYMPHOCYTES NFR BLD MANUAL: 8 % (ref 21–51)
MAGNESIUM SERPL-MCNC: 2.4 MG/DL (ref 1.5–2.4)
MCH RBC QN AUTO: 30.7 PG (ref 27–31)
MCHC RBC AUTO-ENTMCNC: 32.8 G/DL (ref 33–36.5)
MCV RBC AUTO: 93.6 FL (ref 78–98)
METAMYELOCYTES NFR BLD MANUAL: 1 % (ref 0–0)
METHGB MFR BLDA: 0.3 % (ref 0–1.5)
MONOCYTES # BLD AUTO: 0.4 X10'3 (ref 0–0.9)
MONOCYTES NFR BLD AUTO: 5 % (ref 2–12)
MONOCYTES NFR BLD MANUAL: 4 % (ref 2–12)
MYELOCYTES NFR BLD MANUAL: 1 % (ref 0–0)
NEUTROPHILS # BLD AUTO: 6 X10'3 (ref 1.8–7.7)
NEUTROPHILS NFR BLD AUTO: 84.6 % (ref 42–75)
NEUTS BAND # BLD MANUAL: 7 % (ref 0–10)
NEUTS SEG NFR BLD MANUAL: 79 % (ref 42–75)
NRBC BLD MANUAL-RTO: 11 /100WBC (ref 0–0)
O2/TOTAL GAS SETTING VFR VENT: 95 MMHG/%
OXYHGB MFR BLDA: 93.7 % (ref 94–97)
PCO2 TEMP ADJ BLDA: 45.2 MMHG (ref 32–45)
PEEP RESPIRATORY: 16 CM H2O
PHOSPHATE SERPL-MCNC: 1.5 MG/DL (ref 2.3–4.5)
PLATELET # BLD AUTO: 134 X10'3 (ref 140–440)
PLATELET BLD QL SMEAR: (no result)
PMV BLD AUTO: 10 FL (ref 7.4–10.4)
PO2 TEMP ADJ BLD: 75.3 MMHG (ref 75–100)
POLYCHROMASIA BLD QL SMEAR: (no result)
POTASSIUM SERPL-SCNC: 4.4 MMOL/L (ref 3.5–5.1)
PROT SERPL-MCNC: 6 G/DL (ref 6.4–8.2)
RBC # BLD AUTO: 3.05 X10'6 (ref 4.2–5.6)
RBC MORPH BLD: (no result)
RESP RATE 1H: 26 B/MIN
SAO2 % BLDA: 94.7 % (ref 94–97)
SODIUM SERPL-SCNC: 148 MMOL/L (ref 135–145)
SPONT VT COMPRES GAS VOL SET UNCOR VENT: 400 ML
TOTAL CELLS COUNTED FLD: 100
WBC # BLD AUTO: 7.1 X10'3 (ref 4.5–11)

## 2021-10-31 RX ADMIN — METHYLPREDNISOLONE SODIUM SUCCINATE SCH MG: 40 INJECTION, POWDER, FOR SOLUTION INTRAMUSCULAR; INTRAVENOUS at 09:26

## 2021-10-31 RX ADMIN — Medication PRN MLS/HR: at 14:26

## 2021-10-31 RX ADMIN — LEVALBUTEROL SCH MG: 0.63 SOLUTION RESPIRATORY (INHALATION) at 09:00

## 2021-10-31 RX ADMIN — BUDESONIDE SCH MG: 0.5 INHALANT RESPIRATORY (INHALATION) at 19:35

## 2021-10-31 RX ADMIN — DOCUSATE SODIUM SCH MG: 50 LIQUID ORAL at 08:00

## 2021-10-31 RX ADMIN — INSULIN HUMAN SCH UNIT: 100 INJECTION, SOLUTION PARENTERAL at 04:20

## 2021-10-31 RX ADMIN — Medication SCH MLS/HR: at 21:10

## 2021-10-31 RX ADMIN — SODIUM CHLORIDE, SODIUM LACTATE, POTASSIUM CHLORIDE, AND CALCIUM CHLORIDE SCH MLS/HR: .6; .31; .03; .02 INJECTION, SOLUTION INTRAVENOUS at 14:41

## 2021-10-31 RX ADMIN — SODIUM CHLORIDE SCH MLS/HR: 9 INJECTION INTRAMUSCULAR; INTRAVENOUS; SUBCUTANEOUS at 09:26

## 2021-10-31 RX ADMIN — AMIODARONE HYDROCHLORIDE SCH MLS/HR: 1.8 INJECTION, SOLUTION INTRAVENOUS at 07:01

## 2021-10-31 RX ADMIN — METHYLPREDNISOLONE SODIUM SUCCINATE SCH MG: 40 INJECTION, POWDER, FOR SOLUTION INTRAMUSCULAR; INTRAVENOUS at 21:02

## 2021-10-31 RX ADMIN — BUDESONIDE SCH MG: 0.5 INHALANT RESPIRATORY (INHALATION) at 09:03

## 2021-10-31 RX ADMIN — Medication SCH MLS/HR: at 18:44

## 2021-10-31 RX ADMIN — Medication PRN MLS/HR: at 04:27

## 2021-10-31 RX ADMIN — ENOXAPARIN SODIUM SCH MG: 100 INJECTION SUBCUTANEOUS at 09:27

## 2021-10-31 RX ADMIN — LEVALBUTEROL SCH MG: 0.63 SOLUTION RESPIRATORY (INHALATION) at 16:06

## 2021-10-31 RX ADMIN — AMIODARONE HYDROCHLORIDE SCH MLS/HR: 1.8 INJECTION, SOLUTION INTRAVENOUS at 13:18

## 2021-10-31 RX ADMIN — DOCUSATE SODIUM SCH MG: 50 LIQUID ORAL at 21:03

## 2021-10-31 RX ADMIN — ENOXAPARIN SODIUM SCH MG: 100 INJECTION SUBCUTANEOUS at 21:03

## 2021-10-31 RX ADMIN — AMIODARONE HYDROCHLORIDE SCH MLS/HR: 1.8 INJECTION, SOLUTION INTRAVENOUS at 00:57

## 2021-10-31 RX ADMIN — INSULIN GLARGINE SCH UNIT: 100 INJECTION, SOLUTION SUBCUTANEOUS at 21:26

## 2021-10-31 RX ADMIN — INSULIN HUMAN SCH UNIT: 100 INJECTION, SOLUTION PARENTERAL at 21:25

## 2021-10-31 RX ADMIN — AMIODARONE HYDROCHLORIDE SCH MLS/HR: 1.8 INJECTION, SOLUTION INTRAVENOUS at 19:09

## 2021-10-31 RX ADMIN — INSULIN HUMAN SCH UNIT: 100 INJECTION, SOLUTION PARENTERAL at 09:30

## 2021-10-31 RX ADMIN — Medication SCH MLS/HR: at 09:17

## 2021-10-31 RX ADMIN — Medication SCH MLS/HR: at 11:45

## 2021-10-31 RX ADMIN — Medication SCH MG: at 09:26

## 2021-10-31 RX ADMIN — CEFEPIME HYDROCHLORIDE SCH MLS/HR: 1 INJECTION, SOLUTION INTRAVENOUS at 09:24

## 2021-10-31 RX ADMIN — LEVALBUTEROL SCH MG: 0.63 SOLUTION RESPIRATORY (INHALATION) at 19:36

## 2021-10-31 RX ADMIN — LEVALBUTEROL SCH MG: 0.63 SOLUTION RESPIRATORY (INHALATION) at 02:12

## 2021-10-31 NOTE — NUR
Problems reprioritized. Patient report given, questions answered & plan of care reviewed 
with xavier BARONE.

## 2021-10-31 NOTE — NUR
Patient in room CICU 2009. I have received report from    and had the opportunity to ask 
questions and assume patient care.

## 2021-10-31 NOTE — NUR
Reassessment: Pt remains intubated and sedated. Continues to receive TF at 

goal of Vital High Protein at 65ml/hr. LBM documented 10/19, pt receiving 

routine colace though not receiving PRN bowel care, d/w RN if pt needs 

additional bowel care. No change to nutrition intervention at this time, 

will continue to monitor. 

Recommendations:

1) Continuous TF via OGT using Vital High Protein at 65 mL/hr goal rate to

provide 1560 mL total volume/day, 1560 kcal, 137 g protein, and 1304 mL

water; Adjust if Propofol is resumed

2) Additional 150 mL water flush Q4H; monitor serum Na and adjust as

appropriate

3) Prealbumin q Monday/Thursday; daily scaled weights

4) Routine bowel care; consider additional with 12 days constipation

5) DM education once stable following extubation, A1c 6.8% and no PMH of

DM in EMR. Pt will need official DM dx by physician prior to RD education

-------------------------------------------------------------------------------

Addendum: 10/31/21 at 0804 by Nik Chang RD

-------------------------------------------------------------------------------

Amended: Links added.

## 2021-10-31 NOTE — NUR
Patient in room CICU 2009. I have received report from Alyson BARONE   and had the opportunity to 
ask questions and assume patient care.

Out of ratio per [place] nurse to patient ratio law,  currently assigned 3 ICU patients 
on ventilators, multiple titratable IV drips, including 2 of the patients being on 
vasopressors.  


-------------------------------------------------------------------------------

Addendum: 10/31/21 at 1908 by Brea Vazquez RN

-------------------------------------------------------------------------------

Amended: Links added.

## 2021-11-01 VITALS — DIASTOLIC BLOOD PRESSURE: 62 MMHG | SYSTOLIC BLOOD PRESSURE: 108 MMHG

## 2021-11-01 VITALS — SYSTOLIC BLOOD PRESSURE: 113 MMHG | DIASTOLIC BLOOD PRESSURE: 55 MMHG

## 2021-11-01 VITALS — SYSTOLIC BLOOD PRESSURE: 106 MMHG | DIASTOLIC BLOOD PRESSURE: 61 MMHG

## 2021-11-01 VITALS — DIASTOLIC BLOOD PRESSURE: 57 MMHG | SYSTOLIC BLOOD PRESSURE: 111 MMHG

## 2021-11-01 VITALS — DIASTOLIC BLOOD PRESSURE: 62 MMHG | SYSTOLIC BLOOD PRESSURE: 102 MMHG

## 2021-11-01 VITALS — DIASTOLIC BLOOD PRESSURE: 71 MMHG | SYSTOLIC BLOOD PRESSURE: 117 MMHG

## 2021-11-01 VITALS — SYSTOLIC BLOOD PRESSURE: 92 MMHG | DIASTOLIC BLOOD PRESSURE: 45 MMHG

## 2021-11-01 VITALS — SYSTOLIC BLOOD PRESSURE: 95 MMHG | DIASTOLIC BLOOD PRESSURE: 56 MMHG

## 2021-11-01 VITALS — SYSTOLIC BLOOD PRESSURE: 120 MMHG | DIASTOLIC BLOOD PRESSURE: 77 MMHG

## 2021-11-01 VITALS — SYSTOLIC BLOOD PRESSURE: 105 MMHG | DIASTOLIC BLOOD PRESSURE: 57 MMHG

## 2021-11-01 VITALS — DIASTOLIC BLOOD PRESSURE: 51 MMHG | SYSTOLIC BLOOD PRESSURE: 99 MMHG

## 2021-11-01 VITALS — DIASTOLIC BLOOD PRESSURE: 64 MMHG | SYSTOLIC BLOOD PRESSURE: 106 MMHG

## 2021-11-01 VITALS — SYSTOLIC BLOOD PRESSURE: 104 MMHG | DIASTOLIC BLOOD PRESSURE: 67 MMHG

## 2021-11-01 VITALS — DIASTOLIC BLOOD PRESSURE: 55 MMHG | SYSTOLIC BLOOD PRESSURE: 101 MMHG

## 2021-11-01 VITALS — SYSTOLIC BLOOD PRESSURE: 108 MMHG | DIASTOLIC BLOOD PRESSURE: 65 MMHG

## 2021-11-01 VITALS — DIASTOLIC BLOOD PRESSURE: 61 MMHG | SYSTOLIC BLOOD PRESSURE: 101 MMHG

## 2021-11-01 VITALS — DIASTOLIC BLOOD PRESSURE: 58 MMHG | SYSTOLIC BLOOD PRESSURE: 95 MMHG

## 2021-11-01 VITALS — SYSTOLIC BLOOD PRESSURE: 95 MMHG | DIASTOLIC BLOOD PRESSURE: 57 MMHG

## 2021-11-01 VITALS — SYSTOLIC BLOOD PRESSURE: 116 MMHG | DIASTOLIC BLOOD PRESSURE: 64 MMHG

## 2021-11-01 VITALS — SYSTOLIC BLOOD PRESSURE: 101 MMHG | DIASTOLIC BLOOD PRESSURE: 59 MMHG

## 2021-11-01 VITALS — DIASTOLIC BLOOD PRESSURE: 47 MMHG | SYSTOLIC BLOOD PRESSURE: 99 MMHG

## 2021-11-01 VITALS — SYSTOLIC BLOOD PRESSURE: 100 MMHG | DIASTOLIC BLOOD PRESSURE: 61 MMHG

## 2021-11-01 VITALS — SYSTOLIC BLOOD PRESSURE: 97 MMHG | DIASTOLIC BLOOD PRESSURE: 49 MMHG

## 2021-11-01 VITALS — DIASTOLIC BLOOD PRESSURE: 64 MMHG | SYSTOLIC BLOOD PRESSURE: 105 MMHG

## 2021-11-01 LAB
ALBUMIN SERPL BCP-MCNC: 2.1 G/DL (ref 3.4–5)
ALBUMIN/GLOB SERPL: 0.6 {RATIO} (ref 1.1–1.5)
ALP SERPL-CCNC: 86 IU/L (ref 46–116)
ALT SERPL W P-5'-P-CCNC: 619 U/L (ref 12–78)
ANION GAP SERPL CALCULATED.3IONS-SCNC: 7 MMOL/L (ref 8–16)
ANISOCYTOSIS BLD QL SMEAR: (no result)
ARTERIAL PATENCY WRIST A: POSITIVE
AST SERPL W P-5'-P-CCNC: 90 U/L (ref 10–37)
BASE EXCESS BLDA CALC-SCNC: 2.9 MMOL/L (ref -2–2)
BASOPHILS # BLD AUTO: 0 X10'3 (ref 0–0.2)
BASOPHILS NFR BLD AUTO: 0.2 % (ref 0–1)
BILIRUB SERPL-MCNC: 0.4 MG/DL (ref 0.1–1)
BODY TEMPERATURE: 36.4
BUN SERPL-MCNC: 29 MG/DL (ref 7–18)
BUN/CREAT SERPL: 45.3 (ref 6.6–38)
CALCIUM SERPL-MCNC: 7.7 MG/DL (ref 8.5–10.1)
CHLORIDE SERPL-SCNC: 110 MMOL/L (ref 99–107)
CO2 SERPL-SCNC: 29.4 MMOL/L (ref 24–32)
COHGB MFR BLDA: 0.6 % (ref 0–3.9)
CREAT SERPL-MCNC: 0.64 MG/DL (ref 0.4–0.9)
CRP SERPL HS-MCNC: 0.44 MG/DL (ref 0–0.5)
D DIMER PPP FEU-MCNC: 0.87 MG/L FEU (ref 0–0.5)
DACRYOCYTES BLD QL SMEAR: (no result)
EOSINOPHIL # BLD AUTO: 0 X10'3 (ref 0–0.9)
EOSINOPHIL NFR BLD AUTO: 0 % (ref 0–6)
ERYTHROCYTE [DISTWIDTH] IN BLOOD BY AUTOMATED COUNT: 16 % (ref 11.5–14.5)
ERYTHROCYTE [DISTWIDTH] IN BLOOD BY AUTOMATED COUNT: 16.1 % (ref 11.5–14.5)
GFR SERPL CREATININE-BSD FRML MDRD: > 90 ML/MIN
GLUCOSE SERPL-MCNC: 182 MG/DL (ref 70–104)
HCO3 BLDA-SCNC: 27.9 MMOL/L (ref 22–26)
HCT VFR BLD AUTO: 27.1 % (ref 35–45)
HCT VFR BLD AUTO: 28.4 % (ref 35–45)
HGB BLD-MCNC: 8.9 G/DL (ref 12–16)
HGB BLD-MCNC: 9.4 G/DL (ref 12–16)
HGB BLDA-MCNC: 9.7 G/DL (ref 12–16)
LYMPHOCYTES # BLD AUTO: 0.5 X10'3 (ref 1.1–4.8)
LYMPHOCYTES NFR BLD AUTO: 9.1 % (ref 21–51)
LYMPHOCYTES NFR BLD MANUAL: 12 % (ref 21–51)
MAGNESIUM SERPL-MCNC: 2.2 MG/DL (ref 1.5–2.4)
MCH RBC QN AUTO: 30.7 PG (ref 27–31)
MCH RBC QN AUTO: 30.9 PG (ref 27–31)
MCHC RBC AUTO-ENTMCNC: 32.7 G/DL (ref 33–36.5)
MCHC RBC AUTO-ENTMCNC: 33.2 G/DL (ref 33–36.5)
MCV RBC AUTO: 93.1 FL (ref 78–98)
MCV RBC AUTO: 93.8 FL (ref 78–98)
METAMYELOCYTES NFR BLD MANUAL: 1 % (ref 0–0)
METHGB MFR BLDA: 0.3 % (ref 0–1.5)
MONOCYTES # BLD AUTO: 0.3 X10'3 (ref 0–0.9)
MONOCYTES NFR BLD AUTO: 5.9 % (ref 2–12)
MONOCYTES NFR BLD MANUAL: 4 % (ref 2–12)
NEUTROPHILS # BLD AUTO: 4.9 X10'3 (ref 1.8–7.7)
NEUTROPHILS NFR BLD AUTO: 84.8 % (ref 42–75)
NEUTS BAND # BLD MANUAL: 10 % (ref 0–10)
NEUTS SEG NFR BLD MANUAL: 73 % (ref 42–75)
NRBC BLD MANUAL-RTO: 4 /100WBC (ref 0–0)
O2/TOTAL GAS SETTING VFR VENT: 95 MMHG/%
OXYHGB MFR BLDA: 95.1 % (ref 94–97)
PCO2 TEMP ADJ BLDA: 43.7 MMHG (ref 32–45)
PEEP RESPIRATORY: 16 CM H2O
PHOSPHATE SERPL-MCNC: 2.3 MG/DL (ref 2.3–4.5)
PLATELET # BLD AUTO: 113 X10'3 (ref 140–440)
PLATELET # BLD AUTO: 123 X10'3 (ref 140–440)
PLATELET BLD QL SMEAR: (no result)
PMV BLD AUTO: 10.1 FL (ref 7.4–10.4)
PMV BLD AUTO: 9.9 FL (ref 7.4–10.4)
PO2 TEMP ADJ BLD: 78 MMHG (ref 75–100)
POLYCHROMASIA BLD QL SMEAR: (no result)
POTASSIUM SERPL-SCNC: 4.4 MMOL/L (ref 3.5–5.1)
PREALB SERPL-MCNC: 16 MG/DL (ref 19–36)
PROT SERPL-MCNC: 5.4 G/DL (ref 6.4–8.2)
RBC # BLD AUTO: 2.89 X10'6 (ref 4.2–5.6)
RBC # BLD AUTO: 3.05 X10'6 (ref 4.2–5.6)
RBC MORPH BLD: (no result)
RESP RATE 1H: 26 B/MIN
SAO2 % BLDA: 96 % (ref 94–97)
SODIUM SERPL-SCNC: 146 MMOL/L (ref 135–145)
SPONT VT COMPRES GAS VOL SET UNCOR VENT: 400 ML
TOTAL CELLS COUNTED FLD: 100
VANCOMYCIN SERPL-MCNC: 15.6 UG/ML (ref 6–14)
WBC # BLD AUTO: 5.8 X10'3 (ref 4.5–11)
WBC # BLD AUTO: 6.5 X10'3 (ref 4.5–11)

## 2021-11-01 RX ADMIN — SODIUM CHLORIDE SCH GM: 0.9 IRRIGANT IRRIGATION at 18:05

## 2021-11-01 RX ADMIN — BUDESONIDE SCH MG: 0.5 INHALANT RESPIRATORY (INHALATION) at 20:27

## 2021-11-01 RX ADMIN — SODIUM CHLORIDE SCH GM: 0.9 IRRIGANT IRRIGATION at 21:13

## 2021-11-01 RX ADMIN — Medication SCH MG: at 08:00

## 2021-11-01 RX ADMIN — SODIUM CHLORIDE SCH GM: 0.9 IRRIGANT IRRIGATION at 13:48

## 2021-11-01 RX ADMIN — INSULIN HUMAN SCH UNIT: 100 INJECTION, SOLUTION PARENTERAL at 20:48

## 2021-11-01 RX ADMIN — Medication PRN MLS/HR: at 11:51

## 2021-11-01 RX ADMIN — ENOXAPARIN SODIUM SCH MG: 100 INJECTION SUBCUTANEOUS at 08:00

## 2021-11-01 RX ADMIN — METHYLPREDNISOLONE SODIUM SUCCINATE SCH MG: 40 INJECTION, POWDER, FOR SOLUTION INTRAMUSCULAR; INTRAVENOUS at 21:14

## 2021-11-01 RX ADMIN — INSULIN HUMAN SCH UNIT: 100 INJECTION, SOLUTION PARENTERAL at 03:02

## 2021-11-01 RX ADMIN — SODIUM CHLORIDE SCH GM: 0.9 IRRIGANT IRRIGATION at 15:05

## 2021-11-01 RX ADMIN — SODIUM CHLORIDE SCH GM: 0.9 IRRIGANT IRRIGATION at 16:05

## 2021-11-01 RX ADMIN — Medication SCH MLS/HR: at 19:00

## 2021-11-01 RX ADMIN — AMIODARONE HYDROCHLORIDE SCH MLS/HR: 1.8 INJECTION, SOLUTION INTRAVENOUS at 07:17

## 2021-11-01 RX ADMIN — ENOXAPARIN SODIUM SCH MG: 100 INJECTION SUBCUTANEOUS at 14:43

## 2021-11-01 RX ADMIN — Medication PRN MLS/HR: at 00:17

## 2021-11-01 RX ADMIN — AMIODARONE HYDROCHLORIDE SCH MLS/HR: 1.8 INJECTION, SOLUTION INTRAVENOUS at 01:22

## 2021-11-01 RX ADMIN — LEVALBUTEROL SCH MG: 0.63 SOLUTION RESPIRATORY (INHALATION) at 09:00

## 2021-11-01 RX ADMIN — DEXTROSE SCH MLS/HR: 50 INJECTION, SOLUTION INTRAVENOUS at 11:45

## 2021-11-01 RX ADMIN — Medication PRN MLS/HR: at 17:59

## 2021-11-01 RX ADMIN — SODIUM CHLORIDE SCH GM: 0.9 IRRIGANT IRRIGATION at 22:10

## 2021-11-01 RX ADMIN — CEFEPIME SCH MLS/HR: 1 INJECTION, SOLUTION INTRAVENOUS at 20:00

## 2021-11-01 RX ADMIN — METHYLPREDNISOLONE SODIUM SUCCINATE SCH MG: 40 INJECTION, POWDER, FOR SOLUTION INTRAMUSCULAR; INTRAVENOUS at 08:00

## 2021-11-01 RX ADMIN — LEVALBUTEROL SCH MG: 0.63 SOLUTION RESPIRATORY (INHALATION) at 16:08

## 2021-11-01 RX ADMIN — INSULIN GLARGINE SCH UNIT: 100 INJECTION, SOLUTION SUBCUTANEOUS at 20:52

## 2021-11-01 RX ADMIN — DOCUSATE SODIUM SCH MG: 50 LIQUID ORAL at 21:13

## 2021-11-01 RX ADMIN — DOCUSATE SODIUM SCH MG: 50 LIQUID ORAL at 08:00

## 2021-11-01 RX ADMIN — INSULIN HUMAN SCH UNIT: 100 INJECTION, SOLUTION PARENTERAL at 14:38

## 2021-11-01 RX ADMIN — Medication SCH MLS/HR: at 05:13

## 2021-11-01 RX ADMIN — Medication SCH MLS/HR: at 22:09

## 2021-11-01 RX ADMIN — BUDESONIDE SCH MG: 0.5 INHALANT RESPIRATORY (INHALATION) at 08:59

## 2021-11-01 RX ADMIN — SODIUM CHLORIDE SCH GM: 0.9 IRRIGANT IRRIGATION at 23:05

## 2021-11-01 RX ADMIN — LEVALBUTEROL SCH MG: 0.63 SOLUTION RESPIRATORY (INHALATION) at 20:27

## 2021-11-01 RX ADMIN — SODIUM CHLORIDE SCH GM: 0.9 IRRIGANT IRRIGATION at 12:20

## 2021-11-01 RX ADMIN — SODIUM CHLORIDE SCH GM: 0.9 IRRIGANT IRRIGATION at 16:24

## 2021-11-01 RX ADMIN — SODIUM CHLORIDE SCH MLS/HR: 9 INJECTION INTRAMUSCULAR; INTRAVENOUS; SUBCUTANEOUS at 08:00

## 2021-11-01 RX ADMIN — Medication SCH MLS/HR: at 16:00

## 2021-11-01 RX ADMIN — LEVALBUTEROL SCH MG: 0.63 SOLUTION RESPIRATORY (INHALATION) at 02:09

## 2021-11-01 RX ADMIN — Medication SCH MLS/HR: at 13:49

## 2021-11-01 RX ADMIN — SODIUM CHLORIDE SCH GM: 0.9 IRRIGANT IRRIGATION at 14:43

## 2021-11-01 RX ADMIN — Medication SCH MLS/HR: at 06:35

## 2021-11-01 RX ADMIN — SODIUM CHLORIDE SCH GM: 0.9 IRRIGANT IRRIGATION at 17:05

## 2021-11-01 NOTE — NUR
Problems reprioritized. Patient report given, questions answered & plan of care reviewed 
with Fernanda BARONE.

## 2021-11-02 VITALS — DIASTOLIC BLOOD PRESSURE: 66 MMHG | SYSTOLIC BLOOD PRESSURE: 109 MMHG

## 2021-11-02 VITALS — SYSTOLIC BLOOD PRESSURE: 127 MMHG | DIASTOLIC BLOOD PRESSURE: 66 MMHG

## 2021-11-02 VITALS — SYSTOLIC BLOOD PRESSURE: 125 MMHG | DIASTOLIC BLOOD PRESSURE: 65 MMHG

## 2021-11-02 VITALS — SYSTOLIC BLOOD PRESSURE: 112 MMHG | DIASTOLIC BLOOD PRESSURE: 67 MMHG

## 2021-11-02 VITALS — DIASTOLIC BLOOD PRESSURE: 75 MMHG | SYSTOLIC BLOOD PRESSURE: 132 MMHG

## 2021-11-02 VITALS — DIASTOLIC BLOOD PRESSURE: 68 MMHG | SYSTOLIC BLOOD PRESSURE: 128 MMHG

## 2021-11-02 VITALS — DIASTOLIC BLOOD PRESSURE: 67 MMHG | SYSTOLIC BLOOD PRESSURE: 133 MMHG

## 2021-11-02 VITALS — SYSTOLIC BLOOD PRESSURE: 125 MMHG | DIASTOLIC BLOOD PRESSURE: 62 MMHG

## 2021-11-02 VITALS — SYSTOLIC BLOOD PRESSURE: 124 MMHG | DIASTOLIC BLOOD PRESSURE: 71 MMHG

## 2021-11-02 VITALS — SYSTOLIC BLOOD PRESSURE: 109 MMHG | DIASTOLIC BLOOD PRESSURE: 67 MMHG

## 2021-11-02 VITALS — SYSTOLIC BLOOD PRESSURE: 123 MMHG | DIASTOLIC BLOOD PRESSURE: 76 MMHG

## 2021-11-02 VITALS — DIASTOLIC BLOOD PRESSURE: 79 MMHG | SYSTOLIC BLOOD PRESSURE: 139 MMHG

## 2021-11-02 VITALS — DIASTOLIC BLOOD PRESSURE: 69 MMHG | SYSTOLIC BLOOD PRESSURE: 108 MMHG

## 2021-11-02 VITALS — DIASTOLIC BLOOD PRESSURE: 82 MMHG | SYSTOLIC BLOOD PRESSURE: 141 MMHG

## 2021-11-02 VITALS — SYSTOLIC BLOOD PRESSURE: 108 MMHG | DIASTOLIC BLOOD PRESSURE: 66 MMHG

## 2021-11-02 VITALS — SYSTOLIC BLOOD PRESSURE: 120 MMHG | DIASTOLIC BLOOD PRESSURE: 66 MMHG

## 2021-11-02 VITALS — SYSTOLIC BLOOD PRESSURE: 135 MMHG | DIASTOLIC BLOOD PRESSURE: 81 MMHG

## 2021-11-02 VITALS — SYSTOLIC BLOOD PRESSURE: 130 MMHG | DIASTOLIC BLOOD PRESSURE: 73 MMHG

## 2021-11-02 VITALS — DIASTOLIC BLOOD PRESSURE: 78 MMHG | SYSTOLIC BLOOD PRESSURE: 123 MMHG

## 2021-11-02 VITALS — DIASTOLIC BLOOD PRESSURE: 69 MMHG | SYSTOLIC BLOOD PRESSURE: 118 MMHG

## 2021-11-02 VITALS — DIASTOLIC BLOOD PRESSURE: 69 MMHG | SYSTOLIC BLOOD PRESSURE: 131 MMHG

## 2021-11-02 VITALS — SYSTOLIC BLOOD PRESSURE: 136 MMHG | DIASTOLIC BLOOD PRESSURE: 75 MMHG

## 2021-11-02 VITALS — DIASTOLIC BLOOD PRESSURE: 67 MMHG | SYSTOLIC BLOOD PRESSURE: 117 MMHG

## 2021-11-02 VITALS — DIASTOLIC BLOOD PRESSURE: 79 MMHG | SYSTOLIC BLOOD PRESSURE: 128 MMHG

## 2021-11-02 LAB
ALBUMIN SERPL BCP-MCNC: 2.3 G/DL (ref 3.4–5)
ALBUMIN/GLOB SERPL: 0.6 {RATIO} (ref 1.1–1.5)
ALP SERPL-CCNC: 100 IU/L (ref 46–116)
ALT SERPL W P-5'-P-CCNC: 517 U/L (ref 12–78)
ANION GAP SERPL CALCULATED.3IONS-SCNC: 4 MMOL/L (ref 8–16)
ANISOCYTOSIS BLD QL SMEAR: (no result)
ARTERIAL PATENCY WRIST A: POSITIVE
AST SERPL W P-5'-P-CCNC: 66 U/L (ref 10–37)
BASE EXCESS BLDA CALC-SCNC: 1 MMOL/L (ref -2–2)
BASOPHILS # BLD AUTO: 0 X10'3 (ref 0–0.2)
BASOPHILS NFR BLD AUTO: 0.4 % (ref 0–1)
BILIRUB SERPL-MCNC: 0.5 MG/DL (ref 0.1–1)
BODY TEMPERATURE: 37.3
BUN SERPL-MCNC: 30 MG/DL (ref 7–18)
BUN/CREAT SERPL: 36.1 (ref 6.6–38)
CALCIUM SERPL-MCNC: 8.4 MG/DL (ref 8.5–10.1)
CHLORIDE SERPL-SCNC: 112 MMOL/L (ref 99–107)
CO2 SERPL-SCNC: 31 MMOL/L (ref 24–32)
COHGB MFR BLDA: 1.7 % (ref 0–3.9)
CREAT SERPL-MCNC: 0.83 MG/DL (ref 0.4–0.9)
D DIMER PPP FEU-MCNC: 3.69 MG/L FEU (ref 0–0.5)
DACRYOCYTES BLD QL SMEAR: (no result)
EOSINOPHIL # BLD AUTO: 0 X10'3 (ref 0–0.9)
EOSINOPHIL NFR BLD AUTO: 0 % (ref 0–6)
ERYTHROCYTE [DISTWIDTH] IN BLOOD BY AUTOMATED COUNT: 16.3 % (ref 11.5–14.5)
GFR SERPL CREATININE-BSD FRML MDRD: 71 ML/MIN
GLUCOSE SERPL-MCNC: 94 MG/DL (ref 70–104)
HCO3 BLDA-SCNC: 26 MMOL/L (ref 22–26)
HCT VFR BLD AUTO: 32.5 % (ref 35–45)
HGB BLD-MCNC: 10.6 G/DL (ref 12–16)
HGB BLDA-MCNC: 11.7 G/DL (ref 12–16)
LYMPHOCYTES # BLD AUTO: 0.4 X10'3 (ref 1.1–4.8)
LYMPHOCYTES NFR BLD AUTO: 4.6 % (ref 21–51)
LYMPHOCYTES NFR BLD MANUAL: 6 % (ref 21–51)
MAGNESIUM SERPL-MCNC: 2.7 MG/DL (ref 1.5–2.4)
MCH RBC QN AUTO: 30.8 PG (ref 27–31)
MCHC RBC AUTO-ENTMCNC: 32.6 G/DL (ref 33–36.5)
MCV RBC AUTO: 94.7 FL (ref 78–98)
METAMYELOCYTES NFR BLD MANUAL: 1 % (ref 0–0)
METHGB MFR BLDA: 0.1 % (ref 0–1.5)
MONOCYTES # BLD AUTO: 0.4 X10'3 (ref 0–0.9)
MONOCYTES NFR BLD AUTO: 4.6 % (ref 2–12)
MONOCYTES NFR BLD MANUAL: 7 % (ref 2–12)
MYELOCYTES NFR BLD MANUAL: 1 % (ref 0–0)
NEUTROPHILS # BLD AUTO: 8.8 X10'3 (ref 1.8–7.7)
NEUTROPHILS NFR BLD AUTO: 90.4 % (ref 42–75)
NEUTS BAND # BLD MANUAL: 8 % (ref 0–10)
NEUTS SEG NFR BLD MANUAL: 77 % (ref 42–75)
NRBC BLD MANUAL-RTO: 4 /100WBC (ref 0–0)
O2/TOTAL GAS SETTING VFR VENT: 100 MMHG/%
OXYHGB MFR BLDA: 85.8 % (ref 94–97)
PCO2 TEMP ADJ BLDA: 43.5 MMHG (ref 32–45)
PEEP RESPIRATORY: 16 CM H2O
PHOSPHATE SERPL-MCNC: 3.6 MG/DL (ref 2.3–4.5)
PLATELET # BLD AUTO: 170 X10'3 (ref 140–440)
PLATELET BLD QL SMEAR: NORMAL
PMV BLD AUTO: 10.5 FL (ref 7.4–10.4)
PO2 TEMP ADJ BLD: 54.2 MMHG (ref 75–100)
POTASSIUM SERPL-SCNC: 4.6 MMOL/L (ref 3.5–5.1)
PROT SERPL-MCNC: 6.1 G/DL (ref 6.4–8.2)
RBC # BLD AUTO: 3.43 X10'6 (ref 4.2–5.6)
RBC MORPH BLD: (no result)
RESP RATE 1H: 26 B/MIN
SAO2 % BLDA: 87.4 % (ref 94–97)
SODIUM SERPL-SCNC: 147 MMOL/L (ref 135–145)
SPONT VT COMPRES GAS VOL SET UNCOR VENT: 400 ML
TOTAL CELLS COUNTED FLD: 100
WBC # BLD AUTO: 9.7 X10'3 (ref 4.5–11)

## 2021-11-02 RX ADMIN — SODIUM CHLORIDE SCH GM: 0.9 IRRIGANT IRRIGATION at 09:20

## 2021-11-02 RX ADMIN — SODIUM CHLORIDE SCH GM: 0.9 IRRIGANT IRRIGATION at 13:07

## 2021-11-02 RX ADMIN — Medication SCH MLS/HR: at 05:33

## 2021-11-02 RX ADMIN — SODIUM CHLORIDE SCH GM: 0.9 IRRIGANT IRRIGATION at 04:35

## 2021-11-02 RX ADMIN — Medication PRN MLS/HR: at 09:21

## 2021-11-02 RX ADMIN — SODIUM CHLORIDE SCH GM: 0.9 IRRIGANT IRRIGATION at 00:20

## 2021-11-02 RX ADMIN — Medication SCH MMU: at 20:20

## 2021-11-02 RX ADMIN — SODIUM CHLORIDE SCH GM: 0.9 IRRIGANT IRRIGATION at 15:59

## 2021-11-02 RX ADMIN — Medication SCH MLS/HR: at 20:15

## 2021-11-02 RX ADMIN — INSULIN GLARGINE SCH UNIT: 100 INJECTION, SOLUTION SUBCUTANEOUS at 21:00

## 2021-11-02 RX ADMIN — SODIUM CHLORIDE SCH GM: 0.9 IRRIGANT IRRIGATION at 00:19

## 2021-11-02 RX ADMIN — SODIUM CHLORIDE SCH GM: 0.9 IRRIGANT IRRIGATION at 06:57

## 2021-11-02 RX ADMIN — LEVALBUTEROL SCH MG: 0.63 SOLUTION RESPIRATORY (INHALATION) at 20:00

## 2021-11-02 RX ADMIN — INSULIN HUMAN SCH UNIT: 100 INJECTION, SOLUTION PARENTERAL at 02:24

## 2021-11-02 RX ADMIN — SODIUM CHLORIDE SCH GM: 0.9 IRRIGANT IRRIGATION at 07:20

## 2021-11-02 RX ADMIN — INSULIN HUMAN SCH UNIT: 100 INJECTION, SOLUTION PARENTERAL at 15:58

## 2021-11-02 RX ADMIN — LEVALBUTEROL SCH MG: 0.63 SOLUTION RESPIRATORY (INHALATION) at 03:38

## 2021-11-02 RX ADMIN — DOCUSATE SODIUM SCH MG: 50 LIQUID ORAL at 08:30

## 2021-11-02 RX ADMIN — Medication PRN MLS/HR: at 09:37

## 2021-11-02 RX ADMIN — METHYLPREDNISOLONE SODIUM SUCCINATE SCH MG: 40 INJECTION, POWDER, FOR SOLUTION INTRAMUSCULAR; INTRAVENOUS at 08:32

## 2021-11-02 RX ADMIN — CEFEPIME SCH MLS/HR: 1 INJECTION, SOLUTION INTRAVENOUS at 20:24

## 2021-11-02 RX ADMIN — SODIUM CHLORIDE SCH GM: 0.9 IRRIGANT IRRIGATION at 08:30

## 2021-11-02 RX ADMIN — SODIUM CHLORIDE SCH GM: 0.9 IRRIGANT IRRIGATION at 05:30

## 2021-11-02 RX ADMIN — BUDESONIDE SCH MG: 0.5 INHALANT RESPIRATORY (INHALATION) at 09:22

## 2021-11-02 RX ADMIN — SODIUM CHLORIDE SCH MLS/HR: 9 INJECTION INTRAMUSCULAR; INTRAVENOUS; SUBCUTANEOUS at 07:51

## 2021-11-02 RX ADMIN — Medication SCH MG: at 07:50

## 2021-11-02 RX ADMIN — SODIUM CHLORIDE SCH GM: 0.9 IRRIGANT IRRIGATION at 14:34

## 2021-11-02 RX ADMIN — METHYLPREDNISOLONE SODIUM SUCCINATE SCH MG: 40 INJECTION, POWDER, FOR SOLUTION INTRAMUSCULAR; INTRAVENOUS at 20:21

## 2021-11-02 RX ADMIN — BUDESONIDE SCH MG: 0.5 INHALANT RESPIRATORY (INHALATION) at 20:00

## 2021-11-02 RX ADMIN — SODIUM CHLORIDE SCH GM: 0.9 IRRIGANT IRRIGATION at 04:29

## 2021-11-02 RX ADMIN — LEVALBUTEROL SCH MG: 0.63 SOLUTION RESPIRATORY (INHALATION) at 09:00

## 2021-11-02 RX ADMIN — INSULIN HUMAN SCH UNIT: 100 INJECTION, SOLUTION PARENTERAL at 22:14

## 2021-11-02 RX ADMIN — DOCUSATE SODIUM SCH MG: 50 LIQUID ORAL at 20:20

## 2021-11-02 RX ADMIN — Medication SCH MLS/HR: at 20:23

## 2021-11-02 RX ADMIN — Medication SCH MLS/HR: at 10:50

## 2021-11-02 RX ADMIN — SODIUM CHLORIDE SCH GM: 0.9 IRRIGANT IRRIGATION at 03:20

## 2021-11-02 RX ADMIN — Medication PRN MLS/HR: at 20:22

## 2021-11-02 RX ADMIN — Medication PRN MLS/HR: at 05:31

## 2021-11-02 RX ADMIN — ENOXAPARIN SODIUM SCH MG: 100 INJECTION SUBCUTANEOUS at 07:50

## 2021-11-02 RX ADMIN — Medication PRN MLS/HR: at 00:41

## 2021-11-02 RX ADMIN — CEFEPIME SCH MLS/HR: 1 INJECTION, SOLUTION INTRAVENOUS at 07:48

## 2021-11-02 RX ADMIN — SODIUM CHLORIDE SCH GM: 0.9 IRRIGANT IRRIGATION at 12:29

## 2021-11-02 RX ADMIN — SODIUM CHLORIDE SCH GM: 0.9 IRRIGANT IRRIGATION at 11:14

## 2021-11-02 RX ADMIN — SODIUM CHLORIDE SCH GM: 0.9 IRRIGANT IRRIGATION at 10:22

## 2021-11-02 NOTE — NUR
Patient in room CICU 2009. I have received report from MARIAA BARONE   and had the opportunity 
to ask questions and assume patient care.

## 2021-11-02 NOTE — NUR
RESPIRATORY CALLED.  PTS SATURATION IN THE HIGH 70'S. SUCTIONED, REPOSITIONED,  DR PÉREZ 
PAGEMARINO, VOICE MAIL MESSAGE LEFT

## 2021-11-02 NOTE — NUR
RT AT BEDSIDE,  CALLED PHARMACY TO MAKE SURE THAT THERE IS NOT A MOAXIMUM ON THE LACTULOSE I 
HAVE BEEN GIVING Q 1 HR.  NO STOOL.  DR PÉREZ PAGED PER PHARMACIST.  VOICE MAIL LEFT

## 2021-11-02 NOTE — NUR
PT CONTINUES TO DESATURATE.  DR COLLINS PHONE NOW SAYS THE MAIL BOX IS FULL.  SPOKE TO 
CHARGE NURSE, WHO STATES TO CALL THE HOUSE SUPERVISOR TO REACH HIM.  SELENA BARONE, HOUSE 
SUPERVISOR CALLED, SHE DID NOT HAVE ANOTHER WAY TO REACH HIM.  PTS SATS IN THE 70'S  RT AT 
BEDSIDE BAGGING PT, UP TO 88

## 2021-11-03 VITALS — SYSTOLIC BLOOD PRESSURE: 106 MMHG | DIASTOLIC BLOOD PRESSURE: 61 MMHG

## 2021-11-03 VITALS — SYSTOLIC BLOOD PRESSURE: 86 MMHG | DIASTOLIC BLOOD PRESSURE: 57 MMHG

## 2021-11-03 VITALS — SYSTOLIC BLOOD PRESSURE: 104 MMHG | DIASTOLIC BLOOD PRESSURE: 60 MMHG

## 2021-11-03 VITALS — SYSTOLIC BLOOD PRESSURE: 97 MMHG | DIASTOLIC BLOOD PRESSURE: 56 MMHG

## 2021-11-03 VITALS — SYSTOLIC BLOOD PRESSURE: 147 MMHG | DIASTOLIC BLOOD PRESSURE: 78 MMHG

## 2021-11-03 VITALS — DIASTOLIC BLOOD PRESSURE: 61 MMHG | SYSTOLIC BLOOD PRESSURE: 106 MMHG

## 2021-11-03 VITALS — DIASTOLIC BLOOD PRESSURE: 71 MMHG | SYSTOLIC BLOOD PRESSURE: 142 MMHG

## 2021-11-03 VITALS — DIASTOLIC BLOOD PRESSURE: 70 MMHG | SYSTOLIC BLOOD PRESSURE: 113 MMHG

## 2021-11-03 VITALS — DIASTOLIC BLOOD PRESSURE: 61 MMHG | SYSTOLIC BLOOD PRESSURE: 109 MMHG

## 2021-11-03 VITALS — DIASTOLIC BLOOD PRESSURE: 64 MMHG | SYSTOLIC BLOOD PRESSURE: 98 MMHG

## 2021-11-03 VITALS — DIASTOLIC BLOOD PRESSURE: 63 MMHG | SYSTOLIC BLOOD PRESSURE: 118 MMHG

## 2021-11-03 VITALS — SYSTOLIC BLOOD PRESSURE: 110 MMHG | DIASTOLIC BLOOD PRESSURE: 57 MMHG

## 2021-11-03 VITALS — SYSTOLIC BLOOD PRESSURE: 112 MMHG | DIASTOLIC BLOOD PRESSURE: 65 MMHG

## 2021-11-03 VITALS — SYSTOLIC BLOOD PRESSURE: 108 MMHG | DIASTOLIC BLOOD PRESSURE: 70 MMHG

## 2021-11-03 VITALS — SYSTOLIC BLOOD PRESSURE: 103 MMHG | DIASTOLIC BLOOD PRESSURE: 69 MMHG

## 2021-11-03 VITALS — DIASTOLIC BLOOD PRESSURE: 65 MMHG | SYSTOLIC BLOOD PRESSURE: 108 MMHG

## 2021-11-03 VITALS — SYSTOLIC BLOOD PRESSURE: 115 MMHG | DIASTOLIC BLOOD PRESSURE: 64 MMHG

## 2021-11-03 VITALS — DIASTOLIC BLOOD PRESSURE: 68 MMHG | SYSTOLIC BLOOD PRESSURE: 125 MMHG

## 2021-11-03 VITALS — DIASTOLIC BLOOD PRESSURE: 59 MMHG | SYSTOLIC BLOOD PRESSURE: 111 MMHG

## 2021-11-03 VITALS — SYSTOLIC BLOOD PRESSURE: 95 MMHG | DIASTOLIC BLOOD PRESSURE: 54 MMHG

## 2021-11-03 VITALS — SYSTOLIC BLOOD PRESSURE: 110 MMHG | DIASTOLIC BLOOD PRESSURE: 66 MMHG

## 2021-11-03 LAB
ALBUMIN SERPL BCP-MCNC: 2.1 G/DL (ref 3.4–5)
ALBUMIN/GLOB SERPL: 0.6 {RATIO} (ref 1.1–1.5)
ALP SERPL-CCNC: 98 IU/L (ref 46–116)
ALT SERPL W P-5'-P-CCNC: 343 U/L (ref 12–78)
ANION GAP SERPL CALCULATED.3IONS-SCNC: 7 MMOL/L (ref 8–16)
ARTERIAL PATENCY WRIST A: POSITIVE
AST SERPL W P-5'-P-CCNC: 35 U/L (ref 10–37)
BASE EXCESS BLDA CALC-SCNC: -2.8 MMOL/L (ref -2–2)
BASOPHILS # BLD AUTO: 0 X10'3 (ref 0–0.2)
BASOPHILS NFR BLD AUTO: 0 % (ref 0–1)
BILIRUB SERPL-MCNC: 0.4 MG/DL (ref 0.1–1)
BODY TEMPERATURE: 37.4
BUN SERPL-MCNC: 28 MG/DL (ref 7–18)
BUN/CREAT SERPL: 33.7 (ref 6.6–38)
CALCIUM SERPL-MCNC: 7.8 MG/DL (ref 8.5–10.1)
CHLORIDE SERPL-SCNC: 110 MMOL/L (ref 99–107)
CO2 SERPL-SCNC: 28.1 MMOL/L (ref 24–32)
COHGB MFR BLDA: 1.5 % (ref 0–3.9)
CREAT SERPL-MCNC: 0.83 MG/DL (ref 0.4–0.9)
D DIMER PPP FEU-MCNC: 3.2 MG/L FEU (ref 0–0.5)
EOSINOPHIL # BLD AUTO: 0 X10'3 (ref 0–0.9)
EOSINOPHIL NFR BLD AUTO: 0 % (ref 0–6)
ERYTHROCYTE [DISTWIDTH] IN BLOOD BY AUTOMATED COUNT: 16.8 % (ref 11.5–14.5)
GFR SERPL CREATININE-BSD FRML MDRD: 71 ML/MIN
GLUCOSE SERPL-MCNC: 164 MG/DL (ref 70–104)
HCO3 BLDA-SCNC: 24.6 MMOL/L (ref 22–26)
HCT VFR BLD AUTO: 31.6 % (ref 35–45)
HGB BLD-MCNC: 10.4 G/DL (ref 12–16)
HGB BLDA-MCNC: 11.3 G/DL (ref 12–16)
LYMPHOCYTES # BLD AUTO: 0.3 X10'3 (ref 1.1–4.8)
LYMPHOCYTES NFR BLD AUTO: 3.4 % (ref 21–51)
MAGNESIUM SERPL-MCNC: 2.3 MG/DL (ref 1.5–2.4)
MCH RBC QN AUTO: 31.3 PG (ref 27–31)
MCHC RBC AUTO-ENTMCNC: 32.8 G/DL (ref 33–36.5)
MCV RBC AUTO: 95.5 FL (ref 78–98)
METHGB MFR BLDA: 0 % (ref 0–1.5)
MONOCYTES # BLD AUTO: 0.2 X10'3 (ref 0–0.9)
MONOCYTES NFR BLD AUTO: 2.3 % (ref 2–12)
NEUTROPHILS # BLD AUTO: 7 X10'3 (ref 1.8–7.7)
NEUTROPHILS NFR BLD AUTO: 94.3 % (ref 42–75)
O2/TOTAL GAS SETTING VFR VENT: 100 MMHG/%
OXYHGB MFR BLDA: 93.8 % (ref 94–97)
PCO2 TEMP ADJ BLDA: 56 MMHG (ref 32–45)
PHOSPHATE SERPL-MCNC: 4.8 MG/DL (ref 2.3–4.5)
PLATELET # BLD AUTO: 179 X10'3 (ref 140–440)
PMV BLD AUTO: 10.2 FL (ref 7.4–10.4)
PO2 TEMP ADJ BLD: 87.3 MMHG (ref 75–100)
POTASSIUM SERPL-SCNC: 4.2 MMOL/L (ref 3.5–5.1)
PROT SERPL-MCNC: 5.9 G/DL (ref 6.4–8.2)
RBC # BLD AUTO: 3.31 X10'6 (ref 4.2–5.6)
RESP RATE 1H: 26 B/MIN
SAO2 % BLDA: 95.2 % (ref 94–97)
SODIUM SERPL-SCNC: 145 MMOL/L (ref 135–145)
SPONT VT COMPRES GAS VOL SET UNCOR VENT: 400 ML
WBC # BLD AUTO: 7.4 X10'3 (ref 4.5–11)

## 2021-11-03 RX ADMIN — Medication PRN MLS/HR: at 20:04

## 2021-11-03 RX ADMIN — Medication SCH MLS/HR: at 04:52

## 2021-11-03 RX ADMIN — LEVALBUTEROL SCH MG: 0.63 SOLUTION RESPIRATORY (INHALATION) at 02:46

## 2021-11-03 RX ADMIN — INSULIN HUMAN SCH UNIT: 100 INJECTION, SOLUTION PARENTERAL at 20:57

## 2021-11-03 RX ADMIN — ENOXAPARIN SODIUM SCH MG: 100 INJECTION SUBCUTANEOUS at 07:24

## 2021-11-03 RX ADMIN — INSULIN HUMAN SCH UNIT: 100 INJECTION, SOLUTION PARENTERAL at 08:19

## 2021-11-03 RX ADMIN — BUDESONIDE SCH MG: 0.5 INHALANT RESPIRATORY (INHALATION) at 07:39

## 2021-11-03 RX ADMIN — METHYLPREDNISOLONE SODIUM SUCCINATE SCH MG: 40 INJECTION, POWDER, FOR SOLUTION INTRAMUSCULAR; INTRAVENOUS at 20:09

## 2021-11-03 RX ADMIN — INSULIN GLARGINE SCH UNIT: 100 INJECTION, SOLUTION SUBCUTANEOUS at 20:59

## 2021-11-03 RX ADMIN — BUDESONIDE SCH MG: 0.5 INHALANT RESPIRATORY (INHALATION) at 20:59

## 2021-11-03 RX ADMIN — Medication PRN MLS/HR: at 01:15

## 2021-11-03 RX ADMIN — Medication SCH MG: at 07:23

## 2021-11-03 RX ADMIN — DOCUSATE SODIUM SCH MG: 50 LIQUID ORAL at 07:23

## 2021-11-03 RX ADMIN — Medication SCH MLS/HR: at 22:32

## 2021-11-03 RX ADMIN — CEFEPIME HYDROCHLORIDE SCH MLS/HR: 2 INJECTION, SOLUTION INTRAVENOUS at 07:21

## 2021-11-03 RX ADMIN — INSULIN HUMAN SCH UNIT: 100 INJECTION, SOLUTION PARENTERAL at 02:30

## 2021-11-03 RX ADMIN — Medication PRN MLS/HR: at 09:26

## 2021-11-03 RX ADMIN — METHYLPREDNISOLONE SODIUM SUCCINATE SCH MG: 40 INJECTION, POWDER, FOR SOLUTION INTRAMUSCULAR; INTRAVENOUS at 07:23

## 2021-11-03 RX ADMIN — SODIUM CHLORIDE SCH MLS/HR: 9 INJECTION INTRAMUSCULAR; INTRAVENOUS; SUBCUTANEOUS at 08:15

## 2021-11-03 RX ADMIN — Medication SCH MMU: at 20:10

## 2021-11-03 RX ADMIN — Medication SCH MMU: at 07:23

## 2021-11-03 RX ADMIN — INSULIN HUMAN SCH UNIT: 100 INJECTION, SOLUTION PARENTERAL at 14:28

## 2021-11-03 RX ADMIN — Medication PRN MLS/HR: at 04:51

## 2021-11-03 RX ADMIN — Medication SCH MLS/HR: at 14:41

## 2021-11-03 RX ADMIN — DOCUSATE SODIUM SCH MG: 50 LIQUID ORAL at 20:10

## 2021-11-03 RX ADMIN — LEVALBUTEROL SCH MG: 0.63 SOLUTION RESPIRATORY (INHALATION) at 07:39

## 2021-11-03 RX ADMIN — LEVALBUTEROL SCH MG: 0.63 SOLUTION RESPIRATORY (INHALATION) at 15:24

## 2021-11-03 RX ADMIN — LEVALBUTEROL SCH MG: 0.63 SOLUTION RESPIRATORY (INHALATION) at 20:59

## 2021-11-03 RX ADMIN — Medication SCH MLS/HR: at 05:40

## 2021-11-03 RX ADMIN — Medication PRN MLS/HR: at 14:30

## 2021-11-03 RX ADMIN — CEFEPIME HYDROCHLORIDE SCH MLS/HR: 2 INJECTION, SOLUTION INTRAVENOUS at 20:09

## 2021-11-03 RX ADMIN — Medication SCH MLS/HR: at 15:05

## 2021-11-03 RX ADMIN — Medication SCH MLS/HR: at 06:16

## 2021-11-03 NOTE — NUR
Patient in room CICU 2009. I have received report from  Zamzam BARONE  and had the opportunity 
to ask questions and assume patient care.

## 2021-11-03 NOTE — NUR
Received message from coworker that family had called and wanted Dr. Dietrich to put in a 
transfer, consult for [facility].  Dr. Dietrich notified.

## 2021-11-03 NOTE — NUR
Problems reprioritized. Patient report given, questions answered & plan of care reviewed 
with Zamzam.

## 2021-11-03 NOTE — NUR
Reassessment: Pt remains intubated and tolerating TF at goal rate with GRV 

WNL. LBM 11/2 documented with 1 L stool output and now with a rectal tube 

in place, first BM since 10/19 per EMR. Recommend continuing with routine 

bowel care given previous constipation. No changes to nutrition 

recommendations at this time. Will continue to follow.

Recommendations:

1) Continuous TF via OGT using Vital High Protein at 65 mL/hr to provide 1560 mL total 
volume/day, 1560 kcal, 137 g protein, and 1304 mL water; Adjust if Propofol is resumed

2) Additional 150 mL water flush Q4H; monitor serum Na and adjust as

appropriate

3) Prealbumin q Monday/Thursday; daily scaled weights

4) Routine bowel care; previously constipated 15 days

5) DM education once stable following extubation, A1c 6.8% and no PMH of

DM in EMR. Pt will need official DM dx by physician prior to RD education

-------------------------------------------------------------------------------

Addendum: 11/03/21 at 1124 by Carmela Walsh RD

-------------------------------------------------------------------------------

Amended: Links added.

## 2021-11-03 NOTE — NUR
Rounded with Dr. Dietrich.  Patients FIO turned down to 85% by RT. per Dr. Dietrich.  I 
discussed with him that family would like a telephone call from him so they can make her a 
DNR, but they do not want to withdraw the ventilator at this time.

## 2021-11-04 VITALS — SYSTOLIC BLOOD PRESSURE: 166 MMHG | DIASTOLIC BLOOD PRESSURE: 89 MMHG

## 2021-11-04 VITALS — SYSTOLIC BLOOD PRESSURE: 137 MMHG | DIASTOLIC BLOOD PRESSURE: 81 MMHG

## 2021-11-04 VITALS — DIASTOLIC BLOOD PRESSURE: 58 MMHG | SYSTOLIC BLOOD PRESSURE: 108 MMHG

## 2021-11-04 VITALS — DIASTOLIC BLOOD PRESSURE: 68 MMHG | SYSTOLIC BLOOD PRESSURE: 117 MMHG

## 2021-11-04 VITALS — DIASTOLIC BLOOD PRESSURE: 81 MMHG | SYSTOLIC BLOOD PRESSURE: 161 MMHG

## 2021-11-04 VITALS — SYSTOLIC BLOOD PRESSURE: 176 MMHG | DIASTOLIC BLOOD PRESSURE: 73 MMHG

## 2021-11-04 VITALS — DIASTOLIC BLOOD PRESSURE: 54 MMHG | SYSTOLIC BLOOD PRESSURE: 102 MMHG

## 2021-11-04 VITALS — SYSTOLIC BLOOD PRESSURE: 126 MMHG | DIASTOLIC BLOOD PRESSURE: 77 MMHG

## 2021-11-04 VITALS — SYSTOLIC BLOOD PRESSURE: 161 MMHG | DIASTOLIC BLOOD PRESSURE: 92 MMHG

## 2021-11-04 VITALS — DIASTOLIC BLOOD PRESSURE: 93 MMHG | SYSTOLIC BLOOD PRESSURE: 179 MMHG

## 2021-11-04 VITALS — DIASTOLIC BLOOD PRESSURE: 87 MMHG | SYSTOLIC BLOOD PRESSURE: 165 MMHG

## 2021-11-04 VITALS — SYSTOLIC BLOOD PRESSURE: 115 MMHG | DIASTOLIC BLOOD PRESSURE: 64 MMHG

## 2021-11-04 VITALS — SYSTOLIC BLOOD PRESSURE: 161 MMHG | DIASTOLIC BLOOD PRESSURE: 94 MMHG

## 2021-11-04 VITALS — SYSTOLIC BLOOD PRESSURE: 151 MMHG | DIASTOLIC BLOOD PRESSURE: 70 MMHG

## 2021-11-04 VITALS — DIASTOLIC BLOOD PRESSURE: 89 MMHG | SYSTOLIC BLOOD PRESSURE: 164 MMHG

## 2021-11-04 VITALS — SYSTOLIC BLOOD PRESSURE: 109 MMHG | DIASTOLIC BLOOD PRESSURE: 56 MMHG

## 2021-11-04 VITALS — DIASTOLIC BLOOD PRESSURE: 53 MMHG | SYSTOLIC BLOOD PRESSURE: 103 MMHG

## 2021-11-04 VITALS — DIASTOLIC BLOOD PRESSURE: 92 MMHG | SYSTOLIC BLOOD PRESSURE: 170 MMHG

## 2021-11-04 VITALS — SYSTOLIC BLOOD PRESSURE: 133 MMHG | DIASTOLIC BLOOD PRESSURE: 76 MMHG

## 2021-11-04 VITALS — SYSTOLIC BLOOD PRESSURE: 146 MMHG | DIASTOLIC BLOOD PRESSURE: 74 MMHG

## 2021-11-04 VITALS — SYSTOLIC BLOOD PRESSURE: 177 MMHG | DIASTOLIC BLOOD PRESSURE: 86 MMHG

## 2021-11-04 VITALS — DIASTOLIC BLOOD PRESSURE: 69 MMHG | SYSTOLIC BLOOD PRESSURE: 149 MMHG

## 2021-11-04 VITALS — SYSTOLIC BLOOD PRESSURE: 92 MMHG | DIASTOLIC BLOOD PRESSURE: 48 MMHG

## 2021-11-04 VITALS — SYSTOLIC BLOOD PRESSURE: 127 MMHG | DIASTOLIC BLOOD PRESSURE: 82 MMHG

## 2021-11-04 LAB
ALBUMIN SERPL BCP-MCNC: 1.9 G/DL (ref 3.4–5)
ALBUMIN/GLOB SERPL: 0.5 {RATIO} (ref 1.1–1.5)
ALP SERPL-CCNC: 78 IU/L (ref 46–116)
ALT SERPL W P-5'-P-CCNC: 234 U/L (ref 12–78)
ANION GAP SERPL CALCULATED.3IONS-SCNC: 3 MMOL/L (ref 8–16)
ARTERIAL PATENCY WRIST A: POSITIVE
AST SERPL W P-5'-P-CCNC: 31 U/L (ref 10–37)
BASE EXCESS BLDA CALC-SCNC: 4.7 MMOL/L (ref -2–2)
BASOPHILS # BLD AUTO: 0 X10'3 (ref 0–0.2)
BASOPHILS NFR BLD AUTO: 0 % (ref 0–1)
BILIRUB SERPL-MCNC: 0.4 MG/DL (ref 0.1–1)
BODY TEMPERATURE: 36.4
BUN SERPL-MCNC: 26 MG/DL (ref 7–18)
BUN/CREAT SERPL: 49.1 (ref 6.6–38)
CALCIUM SERPL-MCNC: 8 MG/DL (ref 8.5–10.1)
CHLORIDE SERPL-SCNC: 105 MMOL/L (ref 99–107)
CO2 SERPL-SCNC: 32.2 MMOL/L (ref 24–32)
COHGB MFR BLDA: 0.9 % (ref 0–3.9)
CREAT SERPL-MCNC: 0.53 MG/DL (ref 0.4–0.9)
CRP SERPL HS-MCNC: 1.45 MG/DL (ref 0–0.5)
D DIMER PPP FEU-MCNC: 3.22 MG/L FEU (ref 0–0.5)
EOSINOPHIL # BLD AUTO: 0 X10'3 (ref 0–0.9)
EOSINOPHIL NFR BLD AUTO: 0.1 % (ref 0–6)
ERYTHROCYTE [DISTWIDTH] IN BLOOD BY AUTOMATED COUNT: 16 % (ref 11.5–14.5)
GFR SERPL CREATININE-BSD FRML MDRD: > 90 ML/MIN
GLUCOSE SERPL-MCNC: 136 MG/DL (ref 70–104)
HCO3 BLDA-SCNC: 29.3 MMOL/L (ref 22–26)
HCT VFR BLD AUTO: 29.7 % (ref 35–45)
HGB BLD-MCNC: 9.8 G/DL (ref 12–16)
HGB BLDA-MCNC: 10.2 G/DL (ref 12–16)
LYMPHOCYTES # BLD AUTO: 0.2 X10'3 (ref 1.1–4.8)
LYMPHOCYTES NFR BLD AUTO: 3.1 % (ref 21–51)
MAGNESIUM SERPL-MCNC: 2.1 MG/DL (ref 1.5–2.4)
MCH RBC QN AUTO: 31 PG (ref 27–31)
MCHC RBC AUTO-ENTMCNC: 33.1 G/DL (ref 33–36.5)
MCV RBC AUTO: 93.9 FL (ref 78–98)
METHGB MFR BLDA: 0.1 % (ref 0–1.5)
MONOCYTES # BLD AUTO: 0.1 X10'3 (ref 0–0.9)
MONOCYTES NFR BLD AUTO: 1.9 % (ref 2–12)
NEUTROPHILS # BLD AUTO: 6.7 X10'3 (ref 1.8–7.7)
NEUTROPHILS NFR BLD AUTO: 94.9 % (ref 42–75)
O2/TOTAL GAS SETTING VFR VENT: 90 MMHG/%
OXYHGB MFR BLDA: 95.8 % (ref 94–97)
PCO2 TEMP ADJ BLDA: 42.9 MMHG (ref 32–45)
PEEP RESPIRATORY: 18 CM H2O
PHOSPHATE SERPL-MCNC: 2.8 MG/DL (ref 2.3–4.5)
PLATELET # BLD AUTO: 169 X10'3 (ref 140–440)
PMV BLD AUTO: 9.8 FL (ref 7.4–10.4)
PO2 TEMP ADJ BLD: 84.9 MMHG (ref 75–100)
POTASSIUM SERPL-SCNC: 4.8 MMOL/L (ref 3.5–5.1)
PREALB SERPL-MCNC: 21.3 MG/DL (ref 19–36)
PROT SERPL-MCNC: 5.4 G/DL (ref 6.4–8.2)
RBC # BLD AUTO: 3.16 X10'6 (ref 4.2–5.6)
RESP RATE 1H: 26 B/MIN
SAO2 % BLDA: 96.8 % (ref 94–97)
SODIUM SERPL-SCNC: 140 MMOL/L (ref 135–145)
SPONT VT COMPRES GAS VOL SET UNCOR VENT: 400 ML
WBC # BLD AUTO: 7 X10'3 (ref 4.5–11)

## 2021-11-04 PROCEDURE — 02HV33Z INSERTION OF INFUSION DEVICE INTO SUPERIOR VENA CAVA, PERCUTANEOUS APPROACH: ICD-10-PCS | Performed by: INTERNAL MEDICINE

## 2021-11-04 PROCEDURE — B548ZZA ULTRASONOGRAPHY OF SUPERIOR VENA CAVA, GUIDANCE: ICD-10-PCS | Performed by: INTERNAL MEDICINE

## 2021-11-04 RX ADMIN — LEVALBUTEROL SCH MG: 0.63 SOLUTION RESPIRATORY (INHALATION) at 03:07

## 2021-11-04 RX ADMIN — Medication SCH MLS/HR: at 19:20

## 2021-11-04 RX ADMIN — Medication SCH MMU: at 07:38

## 2021-11-04 RX ADMIN — Medication SCH MLS/HR: at 06:00

## 2021-11-04 RX ADMIN — Medication SCH MLS/HR: at 09:55

## 2021-11-04 RX ADMIN — METHYLPREDNISOLONE SODIUM SUCCINATE SCH MG: 40 INJECTION, POWDER, FOR SOLUTION INTRAMUSCULAR; INTRAVENOUS at 19:46

## 2021-11-04 RX ADMIN — BUDESONIDE SCH MG: 0.5 INHALANT RESPIRATORY (INHALATION) at 21:40

## 2021-11-04 RX ADMIN — METHYLPREDNISOLONE SODIUM SUCCINATE SCH MG: 40 INJECTION, POWDER, FOR SOLUTION INTRAMUSCULAR; INTRAVENOUS at 07:37

## 2021-11-04 RX ADMIN — Medication SCH MG: at 07:38

## 2021-11-04 RX ADMIN — ENOXAPARIN SODIUM SCH MG: 100 INJECTION SUBCUTANEOUS at 07:37

## 2021-11-04 RX ADMIN — Medication SCH MMU: at 19:46

## 2021-11-04 RX ADMIN — INSULIN GLARGINE SCH UNIT: 100 INJECTION, SOLUTION SUBCUTANEOUS at 22:20

## 2021-11-04 RX ADMIN — DOCUSATE SODIUM SCH MG: 50 LIQUID ORAL at 07:38

## 2021-11-04 RX ADMIN — Medication PRN MLS/HR: at 05:39

## 2021-11-04 RX ADMIN — INSULIN HUMAN SCH UNIT: 100 INJECTION, SOLUTION PARENTERAL at 20:47

## 2021-11-04 RX ADMIN — INSULIN HUMAN SCH UNIT: 100 INJECTION, SOLUTION PARENTERAL at 08:21

## 2021-11-04 RX ADMIN — Medication PRN MLS/HR: at 10:51

## 2021-11-04 RX ADMIN — LEVALBUTEROL SCH MG: 0.63 SOLUTION RESPIRATORY (INHALATION) at 07:28

## 2021-11-04 RX ADMIN — Medication PRN MLS/HR: at 19:09

## 2021-11-04 RX ADMIN — CEFEPIME SCH MLS/HR: 1 INJECTION, SOLUTION INTRAVENOUS at 07:36

## 2021-11-04 RX ADMIN — INSULIN HUMAN SCH UNIT: 100 INJECTION, SOLUTION PARENTERAL at 03:05

## 2021-11-04 RX ADMIN — Medication PRN MLS/HR: at 22:34

## 2021-11-04 RX ADMIN — LEVALBUTEROL SCH MG: 0.63 SOLUTION RESPIRATORY (INHALATION) at 21:40

## 2021-11-04 RX ADMIN — INSULIN HUMAN SCH UNIT: 100 INJECTION, SOLUTION PARENTERAL at 15:32

## 2021-11-04 RX ADMIN — BUDESONIDE SCH MG: 0.5 INHALANT RESPIRATORY (INHALATION) at 07:28

## 2021-11-04 RX ADMIN — Medication SCH MLS/HR: at 05:38

## 2021-11-04 RX ADMIN — CEFEPIME SCH MLS/HR: 1 INJECTION, SOLUTION INTRAVENOUS at 19:46

## 2021-11-04 RX ADMIN — SODIUM CHLORIDE SCH MLS/HR: 9 INJECTION INTRAMUSCULAR; INTRAVENOUS; SUBCUTANEOUS at 07:44

## 2021-11-04 RX ADMIN — Medication PRN MLS/HR: at 00:38

## 2021-11-04 RX ADMIN — LEVALBUTEROL SCH MG: 0.63 SOLUTION RESPIRATORY (INHALATION) at 15:53

## 2021-11-04 RX ADMIN — DOCUSATE SODIUM SCH MG: 50 LIQUID ORAL at 20:00

## 2021-11-04 NOTE — NUR
Patient in room CICU 2009. I have received report from Alexsandra BARONE and had the opportunity to 
ask questions and assume patient care.

## 2021-11-04 NOTE — NUR
Problems reprioritized. Patient report given, questions answered & plan of care reviewed 
with Abdi BARONE.

## 2021-11-04 NOTE — NUR
F/u 11/4: Due to national shortage of Vital AF and Vital High Protein, if 

substitution necessary, recommend Pivot 1.5, see recs below for if 

substitution. Unable to meet protein needs given high kcal formula 

Recommendations:

1) Continuous TF via OGT using Vital High Protein at 65 mL/hr to provide

1560 mL total volume/day, 1560 kcal, 137 g protein, and 1304 mL water;

Adjust if Propofol is resumed

2) Additional 150 mL water flush Q4H; monitor serum Na and adjust as

appropriate

3) IF out of Vital High Protein sub Pivot 1.5 at 45ml/hr providing 1080ml volume, 1620kcals, 
101g protein, 820ml free water. Additional water flush 200ml Q4H

4) Prealbumin q Monday/Thursday; daily scaled weights

5) Routine bowel care; previously constipated 15 days

6) DM education once stable following extubation, A1c 6.8% and no PMH of

DM in EMR. Pt will need official DM dx by physician prior to RD education

-------------------------------------------------------------------------------

Addendum: 11/04/21 at 1417 by Nik Chang RD

-------------------------------------------------------------------------------

Amended: Links added.

## 2021-11-05 VITALS — SYSTOLIC BLOOD PRESSURE: 111 MMHG | DIASTOLIC BLOOD PRESSURE: 65 MMHG

## 2021-11-05 VITALS — SYSTOLIC BLOOD PRESSURE: 101 MMHG | DIASTOLIC BLOOD PRESSURE: 59 MMHG

## 2021-11-05 VITALS — SYSTOLIC BLOOD PRESSURE: 92 MMHG | DIASTOLIC BLOOD PRESSURE: 50 MMHG

## 2021-11-05 VITALS — DIASTOLIC BLOOD PRESSURE: 65 MMHG | SYSTOLIC BLOOD PRESSURE: 143 MMHG

## 2021-11-05 VITALS — SYSTOLIC BLOOD PRESSURE: 128 MMHG | DIASTOLIC BLOOD PRESSURE: 68 MMHG

## 2021-11-05 VITALS — DIASTOLIC BLOOD PRESSURE: 66 MMHG | SYSTOLIC BLOOD PRESSURE: 158 MMHG

## 2021-11-05 VITALS — DIASTOLIC BLOOD PRESSURE: 70 MMHG | SYSTOLIC BLOOD PRESSURE: 129 MMHG

## 2021-11-05 VITALS — SYSTOLIC BLOOD PRESSURE: 111 MMHG | DIASTOLIC BLOOD PRESSURE: 67 MMHG

## 2021-11-05 VITALS — DIASTOLIC BLOOD PRESSURE: 74 MMHG | SYSTOLIC BLOOD PRESSURE: 130 MMHG

## 2021-11-05 VITALS — DIASTOLIC BLOOD PRESSURE: 79 MMHG | SYSTOLIC BLOOD PRESSURE: 140 MMHG

## 2021-11-05 VITALS — SYSTOLIC BLOOD PRESSURE: 151 MMHG | DIASTOLIC BLOOD PRESSURE: 79 MMHG

## 2021-11-05 VITALS — SYSTOLIC BLOOD PRESSURE: 112 MMHG | DIASTOLIC BLOOD PRESSURE: 68 MMHG

## 2021-11-05 VITALS — DIASTOLIC BLOOD PRESSURE: 63 MMHG | SYSTOLIC BLOOD PRESSURE: 115 MMHG

## 2021-11-05 VITALS — SYSTOLIC BLOOD PRESSURE: 124 MMHG | DIASTOLIC BLOOD PRESSURE: 67 MMHG

## 2021-11-05 VITALS — SYSTOLIC BLOOD PRESSURE: 141 MMHG | DIASTOLIC BLOOD PRESSURE: 75 MMHG

## 2021-11-05 VITALS — DIASTOLIC BLOOD PRESSURE: 75 MMHG | SYSTOLIC BLOOD PRESSURE: 122 MMHG

## 2021-11-05 VITALS — SYSTOLIC BLOOD PRESSURE: 79 MMHG | DIASTOLIC BLOOD PRESSURE: 44 MMHG

## 2021-11-05 VITALS — DIASTOLIC BLOOD PRESSURE: 61 MMHG | SYSTOLIC BLOOD PRESSURE: 103 MMHG

## 2021-11-05 VITALS — SYSTOLIC BLOOD PRESSURE: 105 MMHG | DIASTOLIC BLOOD PRESSURE: 63 MMHG

## 2021-11-05 VITALS — SYSTOLIC BLOOD PRESSURE: 129 MMHG | DIASTOLIC BLOOD PRESSURE: 84 MMHG

## 2021-11-05 VITALS — DIASTOLIC BLOOD PRESSURE: 86 MMHG | SYSTOLIC BLOOD PRESSURE: 160 MMHG

## 2021-11-05 VITALS — SYSTOLIC BLOOD PRESSURE: 110 MMHG | DIASTOLIC BLOOD PRESSURE: 62 MMHG

## 2021-11-05 VITALS — SYSTOLIC BLOOD PRESSURE: 141 MMHG | DIASTOLIC BLOOD PRESSURE: 88 MMHG

## 2021-11-05 LAB
ALBUMIN SERPL BCP-MCNC: 1.8 G/DL (ref 3.4–5)
ALBUMIN SERPL BCP-MCNC: 2 G/DL (ref 3.4–5)
ALBUMIN/GLOB SERPL: 0.5 {RATIO} (ref 1.1–1.5)
ALP SERPL-CCNC: 77 IU/L (ref 46–116)
ALT SERPL W P-5'-P-CCNC: 165 U/L (ref 12–78)
ANION GAP SERPL CALCULATED.3IONS-SCNC: 2 MMOL/L (ref 8–16)
ANION GAP SERPL CALCULATED.3IONS-SCNC: 8 MMOL/L (ref 8–16)
ANISOCYTOSIS BLD QL SMEAR: (no result)
ARTERIAL PATENCY WRIST A: POSITIVE
AST SERPL W P-5'-P-CCNC: 29 U/L (ref 10–37)
BASE EXCESS BLDA CALC-SCNC: 4.2 MMOL/L (ref -2–2)
BASOPHILS # BLD AUTO: 0 X10'3 (ref 0–0.2)
BASOPHILS NFR BLD AUTO: 0.3 % (ref 0–1)
BILIRUB SERPL-MCNC: 0.4 MG/DL (ref 0.1–1)
BODY TEMPERATURE: 37
BUN SERPL-MCNC: 25 MG/DL (ref 7–18)
BUN SERPL-MCNC: 25 MG/DL (ref 7–18)
BUN/CREAT SERPL: 53.2 (ref 6.6–38)
BUN/CREAT SERPL: 58.1 (ref 6.6–38)
CALCIUM SERPL-MCNC: 8.4 MG/DL (ref 8.5–10.1)
CALCIUM SERPL-MCNC: 8.4 MG/DL (ref 8.5–10.1)
CHLORIDE SERPL-SCNC: 105 MMOL/L (ref 99–107)
CHLORIDE SERPL-SCNC: 105 MMOL/L (ref 99–107)
CO2 SERPL-SCNC: 30.3 MMOL/L (ref 24–32)
CO2 SERPL-SCNC: 32.4 MMOL/L (ref 24–32)
COHGB MFR BLDA: 1.2 % (ref 0–3.9)
CREAT SERPL-MCNC: 0.43 MG/DL (ref 0.4–0.9)
CREAT SERPL-MCNC: 0.47 MG/DL (ref 0.4–0.9)
EOSINOPHIL # BLD AUTO: 0 X10'3 (ref 0–0.9)
EOSINOPHIL NFR BLD AUTO: 0.6 % (ref 0–6)
ERYTHROCYTE [DISTWIDTH] IN BLOOD BY AUTOMATED COUNT: 16.5 % (ref 11.5–14.5)
GFR SERPL CREATININE-BSD FRML MDRD: > 90 ML/MIN
GFR SERPL CREATININE-BSD FRML MDRD: > 90 ML/MIN
GLUCOSE SERPL-MCNC: 103 MG/DL (ref 70–104)
GLUCOSE SERPL-MCNC: 73 MG/DL (ref 70–104)
HCO3 BLDA-SCNC: 28 MMOL/L (ref 22–26)
HCT VFR BLD AUTO: 30 % (ref 35–45)
HGB BLD-MCNC: 9.9 G/DL (ref 12–16)
HGB BLDA-MCNC: 11.8 G/DL (ref 12–16)
LG PLATELETS BLD QL SMEAR: (no result)
LYMPHOCYTES # BLD AUTO: 0.3 X10'3 (ref 1.1–4.8)
LYMPHOCYTES NFR BLD AUTO: 5.4 % (ref 21–51)
LYMPHOCYTES NFR BLD MANUAL: 7 % (ref 21–51)
MAGNESIUM SERPL-MCNC: 1.8 MG/DL (ref 1.5–2.4)
MAGNESIUM SERPL-MCNC: 2 MG/DL (ref 1.5–2.4)
MCH RBC QN AUTO: 31 PG (ref 27–31)
MCHC RBC AUTO-ENTMCNC: 33.1 G/DL (ref 33–36.5)
MCV RBC AUTO: 93.6 FL (ref 78–98)
METAMYELOCYTES NFR BLD MANUAL: 1 % (ref 0–0)
METHGB MFR BLDA: 0 % (ref 0–1.5)
MONOCYTES # BLD AUTO: 0.2 X10'3 (ref 0–0.9)
MONOCYTES NFR BLD AUTO: 3 % (ref 2–12)
MONOCYTES NFR BLD MANUAL: 4 % (ref 2–12)
MYELOCYTES NFR BLD MANUAL: 1 % (ref 0–0)
NEUTROPHILS # BLD AUTO: 5.7 X10'3 (ref 1.8–7.7)
NEUTROPHILS NFR BLD AUTO: 90.7 % (ref 42–75)
NEUTS BAND # BLD MANUAL: 10 % (ref 0–10)
NEUTS SEG NFR BLD MANUAL: 77 % (ref 42–75)
O2/TOTAL GAS SETTING VFR VENT: 85 MMHG/%
OXYHGB MFR BLDA: 90.7 % (ref 94–97)
PCO2 TEMP ADJ BLDA: 38.9 MMHG (ref 32–45)
PEEP RESPIRATORY: 18 CM H2O
PHOSPHATE SERPL-MCNC: 3.8 MG/DL (ref 2.3–4.5)
PLATELET # BLD AUTO: 187 X10'3 (ref 140–440)
PLATELET BLD QL SMEAR: NORMAL
PMV BLD AUTO: 9.9 FL (ref 7.4–10.4)
PO2 TEMP ADJ BLD: 60.6 MMHG (ref 75–100)
POTASSIUM SERPL-SCNC: 3.7 MMOL/L (ref 3.5–5.1)
POTASSIUM SERPL-SCNC: 4.2 MMOL/L (ref 3.5–5.1)
PROT SERPL-MCNC: 5.3 G/DL (ref 6.4–8.2)
RBC # BLD AUTO: 3.2 X10'6 (ref 4.2–5.6)
RBC MORPH BLD: (no result)
RESP RATE 1H: 26 B/MIN
SAO2 % BLDA: 91.8 % (ref 94–97)
SODIUM SERPL-SCNC: 139 MMOL/L (ref 135–145)
SODIUM SERPL-SCNC: 143 MMOL/L (ref 135–145)
SPONT VT COMPRES GAS VOL SET UNCOR VENT: 400 ML
STOMATOCYTES BLD QL SMEAR: (no result)
TOTAL CELLS COUNTED FLD: 100
WBC # BLD AUTO: 6.3 X10'3 (ref 4.5–11)

## 2021-11-05 RX ADMIN — DOCUSATE SODIUM SCH MG: 50 LIQUID ORAL at 21:00

## 2021-11-05 RX ADMIN — Medication PRN MLS/HR: at 20:58

## 2021-11-05 RX ADMIN — Medication SCH MLS/HR: at 20:00

## 2021-11-05 RX ADMIN — BUDESONIDE SCH MG: 0.5 INHALANT RESPIRATORY (INHALATION) at 07:54

## 2021-11-05 RX ADMIN — Medication SCH MG: at 08:28

## 2021-11-05 RX ADMIN — Medication PRN MLS/HR: at 18:06

## 2021-11-05 RX ADMIN — SODIUM CHLORIDE SCH MLS/HR: 9 INJECTION INTRAMUSCULAR; INTRAVENOUS; SUBCUTANEOUS at 08:28

## 2021-11-05 RX ADMIN — BUDESONIDE SCH MG: 0.5 INHALANT RESPIRATORY (INHALATION) at 21:28

## 2021-11-05 RX ADMIN — Medication PRN MLS/HR: at 13:43

## 2021-11-05 RX ADMIN — Medication SCH MLS/HR: at 20:58

## 2021-11-05 RX ADMIN — INSULIN HUMAN SCH UNIT: 100 INJECTION, SOLUTION PARENTERAL at 09:47

## 2021-11-05 RX ADMIN — INSULIN HUMAN SCH UNIT: 100 INJECTION, SOLUTION PARENTERAL at 22:11

## 2021-11-05 RX ADMIN — METHYLPREDNISOLONE SODIUM SUCCINATE SCH MG: 40 INJECTION, POWDER, FOR SOLUTION INTRAMUSCULAR; INTRAVENOUS at 20:59

## 2021-11-05 RX ADMIN — LEVALBUTEROL SCH MG: 0.63 SOLUTION RESPIRATORY (INHALATION) at 21:28

## 2021-11-05 RX ADMIN — LEVALBUTEROL SCH MG: 0.63 SOLUTION RESPIRATORY (INHALATION) at 07:55

## 2021-11-05 RX ADMIN — LEVALBUTEROL SCH MG: 0.63 SOLUTION RESPIRATORY (INHALATION) at 03:14

## 2021-11-05 RX ADMIN — Medication SCH MLS/HR: at 23:35

## 2021-11-05 RX ADMIN — DOCUSATE SODIUM SCH MG: 50 LIQUID ORAL at 08:27

## 2021-11-05 RX ADMIN — LEVALBUTEROL SCH MG: 0.63 SOLUTION RESPIRATORY (INHALATION) at 16:27

## 2021-11-05 RX ADMIN — Medication SCH MLS/HR: at 12:11

## 2021-11-05 RX ADMIN — ENOXAPARIN SODIUM SCH MG: 100 INJECTION SUBCUTANEOUS at 08:28

## 2021-11-05 RX ADMIN — INSULIN HUMAN SCH UNIT: 100 INJECTION, SOLUTION PARENTERAL at 02:40

## 2021-11-05 RX ADMIN — INSULIN GLARGINE SCH UNIT: 100 INJECTION, SOLUTION SUBCUTANEOUS at 21:00

## 2021-11-05 RX ADMIN — Medication SCH MLS/HR: at 19:00

## 2021-11-05 RX ADMIN — Medication PRN MLS/HR: at 09:15

## 2021-11-05 RX ADMIN — Medication SCH MLS/HR: at 04:37

## 2021-11-05 RX ADMIN — Medication PRN MLS/HR: at 03:30

## 2021-11-05 RX ADMIN — METHYLPREDNISOLONE SODIUM SUCCINATE SCH MG: 40 INJECTION, POWDER, FOR SOLUTION INTRAMUSCULAR; INTRAVENOUS at 08:27

## 2021-11-05 RX ADMIN — CEFEPIME SCH MLS/HR: 1 INJECTION, SOLUTION INTRAVENOUS at 08:27

## 2021-11-06 VITALS — SYSTOLIC BLOOD PRESSURE: 107 MMHG | DIASTOLIC BLOOD PRESSURE: 66 MMHG

## 2021-11-06 VITALS — DIASTOLIC BLOOD PRESSURE: 62 MMHG | SYSTOLIC BLOOD PRESSURE: 114 MMHG

## 2021-11-06 VITALS — DIASTOLIC BLOOD PRESSURE: 103 MMHG | SYSTOLIC BLOOD PRESSURE: 147 MMHG

## 2021-11-06 VITALS — DIASTOLIC BLOOD PRESSURE: 89 MMHG | SYSTOLIC BLOOD PRESSURE: 170 MMHG

## 2021-11-06 VITALS — SYSTOLIC BLOOD PRESSURE: 93 MMHG | DIASTOLIC BLOOD PRESSURE: 47 MMHG

## 2021-11-06 VITALS — DIASTOLIC BLOOD PRESSURE: 87 MMHG | SYSTOLIC BLOOD PRESSURE: 146 MMHG

## 2021-11-06 VITALS — DIASTOLIC BLOOD PRESSURE: 66 MMHG | SYSTOLIC BLOOD PRESSURE: 119 MMHG

## 2021-11-06 VITALS — DIASTOLIC BLOOD PRESSURE: 80 MMHG | SYSTOLIC BLOOD PRESSURE: 142 MMHG

## 2021-11-06 VITALS — SYSTOLIC BLOOD PRESSURE: 115 MMHG | DIASTOLIC BLOOD PRESSURE: 57 MMHG

## 2021-11-06 VITALS — SYSTOLIC BLOOD PRESSURE: 164 MMHG | DIASTOLIC BLOOD PRESSURE: 90 MMHG

## 2021-11-06 VITALS — DIASTOLIC BLOOD PRESSURE: 58 MMHG | SYSTOLIC BLOOD PRESSURE: 101 MMHG

## 2021-11-06 VITALS — SYSTOLIC BLOOD PRESSURE: 106 MMHG | DIASTOLIC BLOOD PRESSURE: 58 MMHG

## 2021-11-06 VITALS — DIASTOLIC BLOOD PRESSURE: 75 MMHG | SYSTOLIC BLOOD PRESSURE: 116 MMHG

## 2021-11-06 VITALS — DIASTOLIC BLOOD PRESSURE: 56 MMHG | SYSTOLIC BLOOD PRESSURE: 106 MMHG

## 2021-11-06 VITALS — SYSTOLIC BLOOD PRESSURE: 103 MMHG | DIASTOLIC BLOOD PRESSURE: 60 MMHG

## 2021-11-06 VITALS — SYSTOLIC BLOOD PRESSURE: 133 MMHG | DIASTOLIC BLOOD PRESSURE: 75 MMHG

## 2021-11-06 VITALS — DIASTOLIC BLOOD PRESSURE: 57 MMHG | SYSTOLIC BLOOD PRESSURE: 100 MMHG

## 2021-11-06 VITALS — DIASTOLIC BLOOD PRESSURE: 83 MMHG | SYSTOLIC BLOOD PRESSURE: 150 MMHG

## 2021-11-06 VITALS — DIASTOLIC BLOOD PRESSURE: 81 MMHG | SYSTOLIC BLOOD PRESSURE: 144 MMHG

## 2021-11-06 VITALS — DIASTOLIC BLOOD PRESSURE: 47 MMHG | SYSTOLIC BLOOD PRESSURE: 92 MMHG

## 2021-11-06 VITALS — SYSTOLIC BLOOD PRESSURE: 114 MMHG | DIASTOLIC BLOOD PRESSURE: 60 MMHG

## 2021-11-06 VITALS — SYSTOLIC BLOOD PRESSURE: 85 MMHG | DIASTOLIC BLOOD PRESSURE: 40 MMHG

## 2021-11-06 VITALS — SYSTOLIC BLOOD PRESSURE: 149 MMHG | DIASTOLIC BLOOD PRESSURE: 85 MMHG

## 2021-11-06 VITALS — SYSTOLIC BLOOD PRESSURE: 92 MMHG | DIASTOLIC BLOOD PRESSURE: 45 MMHG

## 2021-11-06 LAB
ALBUMIN SERPL BCP-MCNC: 1.8 G/DL (ref 3.4–5)
ALBUMIN/GLOB SERPL: 0.5 {RATIO} (ref 1.1–1.5)
ALP SERPL-CCNC: 72 IU/L (ref 46–116)
ALT SERPL W P-5'-P-CCNC: 139 U/L (ref 12–78)
ANION GAP SERPL CALCULATED.3IONS-SCNC: 6 MMOL/L (ref 8–16)
ARTERIAL PATENCY WRIST A: POSITIVE
AST SERPL W P-5'-P-CCNC: 33 U/L (ref 10–37)
BASE EXCESS BLDA CALC-SCNC: 4.9 MMOL/L (ref -2–2)
BASOPHILS # BLD AUTO: 0 X10'3 (ref 0–0.2)
BASOPHILS NFR BLD AUTO: 0 % (ref 0–1)
BILIRUB SERPL-MCNC: 0.4 MG/DL (ref 0.1–1)
BODY TEMPERATURE: 36.8
BUN SERPL-MCNC: 22 MG/DL (ref 7–18)
BUN/CREAT SERPL: 44.9 (ref 6.6–38)
CALCIUM SERPL-MCNC: 8.2 MG/DL (ref 8.5–10.1)
CHLORIDE SERPL-SCNC: 105 MMOL/L (ref 99–107)
CO2 SERPL-SCNC: 32 MMOL/L (ref 24–32)
COHGB MFR BLDA: 1 % (ref 0–3.9)
CREAT SERPL-MCNC: 0.49 MG/DL (ref 0.4–0.9)
CRP SERPL HS-MCNC: 6.86 MG/DL (ref 0–0.5)
D DIMER PPP FEU-MCNC: 1.93 MG/L FEU (ref 0–0.5)
EOSINOPHIL # BLD AUTO: 0.1 X10'3 (ref 0–0.9)
EOSINOPHIL NFR BLD AUTO: 1.4 % (ref 0–6)
ERYTHROCYTE [DISTWIDTH] IN BLOOD BY AUTOMATED COUNT: 16.8 % (ref 11.5–14.5)
GFR SERPL CREATININE-BSD FRML MDRD: > 90 ML/MIN
GLUCOSE SERPL-MCNC: 82 MG/DL (ref 70–104)
HCO3 BLDA-SCNC: 30.4 MMOL/L (ref 22–26)
HCT VFR BLD AUTO: 30.8 % (ref 35–45)
HGB BLD-MCNC: 10.3 G/DL (ref 12–16)
HGB BLDA-MCNC: 11.2 G/DL (ref 12–16)
LYMPHOCYTES # BLD AUTO: 0.1 X10'3 (ref 1.1–4.8)
LYMPHOCYTES NFR BLD AUTO: 2 % (ref 21–51)
MAGNESIUM SERPL-MCNC: 2 MG/DL (ref 1.5–2.4)
MCH RBC QN AUTO: 31.2 PG (ref 27–31)
MCHC RBC AUTO-ENTMCNC: 33.3 G/DL (ref 33–36.5)
MCV RBC AUTO: 93.7 FL (ref 78–98)
METHGB MFR BLDA: 0.1 % (ref 0–1.5)
MONOCYTES # BLD AUTO: 0.3 X10'3 (ref 0–0.9)
MONOCYTES NFR BLD AUTO: 3.8 % (ref 2–12)
NEUTROPHILS # BLD AUTO: 6.2 X10'3 (ref 1.8–7.7)
NEUTROPHILS NFR BLD AUTO: 92.8 % (ref 42–75)
O2/TOTAL GAS SETTING VFR VENT: 90 MMHG/%
OXYHGB MFR BLDA: 95.1 % (ref 94–97)
PCO2 TEMP ADJ BLDA: 48.9 MMHG (ref 32–45)
PEEP RESPIRATORY: 18 CM H2O
PLATELET # BLD AUTO: 213 X10'3 (ref 140–440)
PMV BLD AUTO: 9.7 FL (ref 7.4–10.4)
PO2 TEMP ADJ BLD: 86.8 MMHG (ref 75–100)
POTASSIUM SERPL-SCNC: 4.4 MMOL/L (ref 3.5–5.1)
PROT SERPL-MCNC: 5.8 G/DL (ref 6.4–8.2)
RBC # BLD AUTO: 3.29 X10'6 (ref 4.2–5.6)
RESP RATE 1H: 26 B/MIN
SAO2 % BLDA: 96.2 % (ref 94–97)
SODIUM SERPL-SCNC: 143 MMOL/L (ref 135–145)
SPONT VT COMPRES GAS VOL SET UNCOR VENT: 400 ML
TRIGL SERPL-MCNC: 76 MG/DL (ref 20–135)
VANCOMYCIN SERPL-MCNC: 16.6 UG/ML (ref 6–14)
WBC # BLD AUTO: 6.7 X10'3 (ref 4.5–11)

## 2021-11-06 RX ADMIN — INSULIN HUMAN SCH UNIT: 100 INJECTION, SOLUTION PARENTERAL at 02:58

## 2021-11-06 RX ADMIN — Medication SCH MLS/HR: at 05:23

## 2021-11-06 RX ADMIN — LEVALBUTEROL SCH MG: 0.63 SOLUTION RESPIRATORY (INHALATION) at 19:06

## 2021-11-06 RX ADMIN — SODIUM CHLORIDE SCH MLS/HR: 9 INJECTION INTRAMUSCULAR; INTRAVENOUS; SUBCUTANEOUS at 08:18

## 2021-11-06 RX ADMIN — METHYLPREDNISOLONE SODIUM SUCCINATE SCH MG: 40 INJECTION, POWDER, FOR SOLUTION INTRAMUSCULAR; INTRAVENOUS at 19:32

## 2021-11-06 RX ADMIN — Medication PRN MLS/HR: at 08:11

## 2021-11-06 RX ADMIN — DOCUSATE SODIUM SCH MG: 50 LIQUID ORAL at 19:32

## 2021-11-06 RX ADMIN — Medication PRN MLS/HR: at 02:45

## 2021-11-06 RX ADMIN — LEVALBUTEROL SCH MG: 0.63 SOLUTION RESPIRATORY (INHALATION) at 09:00

## 2021-11-06 RX ADMIN — LEVALBUTEROL SCH MG: 0.63 SOLUTION RESPIRATORY (INHALATION) at 02:46

## 2021-11-06 RX ADMIN — CEFEPIME SCH MLS/HR: 1 INJECTION, SOLUTION INTRAVENOUS at 02:31

## 2021-11-06 RX ADMIN — INSULIN HUMAN SCH UNIT: 100 INJECTION, SOLUTION PARENTERAL at 20:43

## 2021-11-06 RX ADMIN — CEFEPIME SCH MLS/HR: 1 INJECTION, SOLUTION INTRAVENOUS at 19:33

## 2021-11-06 RX ADMIN — Medication PRN MLS/HR: at 13:33

## 2021-11-06 RX ADMIN — LEVALBUTEROL SCH MG: 0.63 SOLUTION RESPIRATORY (INHALATION) at 14:58

## 2021-11-06 RX ADMIN — Medication SCH MLS/HR: at 16:15

## 2021-11-06 RX ADMIN — Medication PRN MLS/HR: at 19:33

## 2021-11-06 RX ADMIN — INSULIN HUMAN SCH UNIT: 100 INJECTION, SOLUTION PARENTERAL at 08:50

## 2021-11-06 RX ADMIN — METHYLPREDNISOLONE SODIUM SUCCINATE SCH MG: 40 INJECTION, POWDER, FOR SOLUTION INTRAMUSCULAR; INTRAVENOUS at 08:22

## 2021-11-06 RX ADMIN — INSULIN GLARGINE SCH UNIT: 100 INJECTION, SOLUTION SUBCUTANEOUS at 20:34

## 2021-11-06 RX ADMIN — Medication SCH MLS/HR: at 22:10

## 2021-11-06 RX ADMIN — DOCUSATE SODIUM SCH MG: 50 LIQUID ORAL at 08:22

## 2021-11-06 RX ADMIN — ENOXAPARIN SODIUM SCH MG: 100 INJECTION SUBCUTANEOUS at 08:20

## 2021-11-06 RX ADMIN — Medication SCH MG: at 08:20

## 2021-11-06 RX ADMIN — BUDESONIDE SCH MG: 0.5 INHALANT RESPIRATORY (INHALATION) at 08:02

## 2021-11-06 RX ADMIN — BUDESONIDE SCH MG: 0.5 INHALANT RESPIRATORY (INHALATION) at 19:06

## 2021-11-06 RX ADMIN — CEFEPIME SCH MLS/HR: 1 INJECTION, SOLUTION INTRAVENOUS at 10:31

## 2021-11-07 VITALS — DIASTOLIC BLOOD PRESSURE: 52 MMHG | SYSTOLIC BLOOD PRESSURE: 85 MMHG

## 2021-11-07 VITALS — DIASTOLIC BLOOD PRESSURE: 59 MMHG | SYSTOLIC BLOOD PRESSURE: 96 MMHG

## 2021-11-07 VITALS — DIASTOLIC BLOOD PRESSURE: 76 MMHG | SYSTOLIC BLOOD PRESSURE: 124 MMHG

## 2021-11-07 VITALS — SYSTOLIC BLOOD PRESSURE: 120 MMHG | DIASTOLIC BLOOD PRESSURE: 68 MMHG

## 2021-11-07 VITALS — SYSTOLIC BLOOD PRESSURE: 104 MMHG | DIASTOLIC BLOOD PRESSURE: 46 MMHG

## 2021-11-07 VITALS — SYSTOLIC BLOOD PRESSURE: 127 MMHG | DIASTOLIC BLOOD PRESSURE: 71 MMHG

## 2021-11-07 VITALS — DIASTOLIC BLOOD PRESSURE: 54 MMHG | SYSTOLIC BLOOD PRESSURE: 96 MMHG

## 2021-11-07 VITALS — SYSTOLIC BLOOD PRESSURE: 127 MMHG | DIASTOLIC BLOOD PRESSURE: 74 MMHG

## 2021-11-07 VITALS — DIASTOLIC BLOOD PRESSURE: 56 MMHG | SYSTOLIC BLOOD PRESSURE: 88 MMHG

## 2021-11-07 VITALS — DIASTOLIC BLOOD PRESSURE: 48 MMHG | SYSTOLIC BLOOD PRESSURE: 96 MMHG

## 2021-11-07 VITALS — DIASTOLIC BLOOD PRESSURE: 66 MMHG | SYSTOLIC BLOOD PRESSURE: 135 MMHG

## 2021-11-07 VITALS — DIASTOLIC BLOOD PRESSURE: 61 MMHG | SYSTOLIC BLOOD PRESSURE: 91 MMHG

## 2021-11-07 VITALS — DIASTOLIC BLOOD PRESSURE: 53 MMHG | SYSTOLIC BLOOD PRESSURE: 90 MMHG

## 2021-11-07 VITALS — DIASTOLIC BLOOD PRESSURE: 80 MMHG | SYSTOLIC BLOOD PRESSURE: 142 MMHG

## 2021-11-07 VITALS — DIASTOLIC BLOOD PRESSURE: 85 MMHG | SYSTOLIC BLOOD PRESSURE: 150 MMHG

## 2021-11-07 VITALS — DIASTOLIC BLOOD PRESSURE: 38 MMHG | SYSTOLIC BLOOD PRESSURE: 82 MMHG

## 2021-11-07 VITALS — SYSTOLIC BLOOD PRESSURE: 147 MMHG | DIASTOLIC BLOOD PRESSURE: 85 MMHG

## 2021-11-07 VITALS — SYSTOLIC BLOOD PRESSURE: 118 MMHG | DIASTOLIC BLOOD PRESSURE: 87 MMHG

## 2021-11-07 VITALS — DIASTOLIC BLOOD PRESSURE: 56 MMHG | SYSTOLIC BLOOD PRESSURE: 92 MMHG

## 2021-11-07 VITALS — SYSTOLIC BLOOD PRESSURE: 98 MMHG | DIASTOLIC BLOOD PRESSURE: 57 MMHG

## 2021-11-07 VITALS — SYSTOLIC BLOOD PRESSURE: 102 MMHG | DIASTOLIC BLOOD PRESSURE: 56 MMHG

## 2021-11-07 VITALS — SYSTOLIC BLOOD PRESSURE: 135 MMHG | DIASTOLIC BLOOD PRESSURE: 60 MMHG

## 2021-11-07 VITALS — DIASTOLIC BLOOD PRESSURE: 49 MMHG | SYSTOLIC BLOOD PRESSURE: 91 MMHG

## 2021-11-07 LAB
ALBUMIN SERPL BCP-MCNC: 1.7 G/DL (ref 3.4–5)
ALBUMIN/GLOB SERPL: 0.4 {RATIO} (ref 1.1–1.5)
ALP SERPL-CCNC: 65 IU/L (ref 46–116)
ALT SERPL W P-5'-P-CCNC: 102 U/L (ref 12–78)
ANION GAP SERPL CALCULATED.3IONS-SCNC: 7 MMOL/L (ref 8–16)
ARTERIAL PATENCY WRIST A: POSITIVE
AST SERPL W P-5'-P-CCNC: 17 U/L (ref 10–37)
BASE EXCESS BLDA CALC-SCNC: 7 MMOL/L (ref -2–2)
BASOPHILS # BLD AUTO: 0 X10'3 (ref 0–0.2)
BASOPHILS NFR BLD AUTO: 0.1 % (ref 0–1)
BILIRUB SERPL-MCNC: 0.4 MG/DL (ref 0.1–1)
BODY TEMPERATURE: 36.5
BUN SERPL-MCNC: 22 MG/DL (ref 7–18)
BUN/CREAT SERPL: 44.9 (ref 6.6–38)
CALCIUM SERPL-MCNC: 8.3 MG/DL (ref 8.5–10.1)
CHLORIDE SERPL-SCNC: 105 MMOL/L (ref 99–107)
CO2 SERPL-SCNC: 30.3 MMOL/L (ref 24–32)
COHGB MFR BLDA: 0.8 % (ref 0–3.9)
CREAT SERPL-MCNC: 0.49 MG/DL (ref 0.4–0.9)
CRP SERPL HS-MCNC: 7.57 MG/DL (ref 0–0.5)
D DIMER PPP FEU-MCNC: 1.71 MG/L FEU (ref 0–0.5)
EOSINOPHIL # BLD AUTO: 0 X10'3 (ref 0–0.9)
EOSINOPHIL NFR BLD AUTO: 0.4 % (ref 0–6)
ERYTHROCYTE [DISTWIDTH] IN BLOOD BY AUTOMATED COUNT: 17.2 % (ref 11.5–14.5)
GFR SERPL CREATININE-BSD FRML MDRD: > 90 ML/MIN
GLUCOSE SERPL-MCNC: 129 MG/DL (ref 70–104)
HCO3 BLDA-SCNC: 31.1 MMOL/L (ref 22–26)
HCT VFR BLD AUTO: 27.2 % (ref 35–45)
HGB BLD-MCNC: 9 G/DL (ref 12–16)
HGB BLDA-MCNC: 10.6 G/DL (ref 12–16)
LYMPHOCYTES # BLD AUTO: 0.2 X10'3 (ref 1.1–4.8)
LYMPHOCYTES NFR BLD AUTO: 3.8 % (ref 21–51)
MAGNESIUM SERPL-MCNC: 2 MG/DL (ref 1.5–2.4)
MCH RBC QN AUTO: 31 PG (ref 27–31)
MCHC RBC AUTO-ENTMCNC: 33 G/DL (ref 33–36.5)
MCV RBC AUTO: 94 FL (ref 78–98)
METHGB MFR BLDA: 0.1 % (ref 0–1.5)
MONOCYTES # BLD AUTO: 0.2 X10'3 (ref 0–0.9)
MONOCYTES NFR BLD AUTO: 3.5 % (ref 2–12)
NEUTROPHILS # BLD AUTO: 4.8 X10'3 (ref 1.8–7.7)
NEUTROPHILS NFR BLD AUTO: 92.2 % (ref 42–75)
O2/TOTAL GAS SETTING VFR VENT: 80 MMHG/%
OXYHGB MFR BLDA: 90.7 % (ref 94–97)
PCO2 TEMP ADJ BLDA: 41.5 MMHG (ref 32–45)
PEEP RESPIRATORY: 18 CM H2O
PLATELET # BLD AUTO: 220 X10'3 (ref 140–440)
PMV BLD AUTO: 9.7 FL (ref 7.4–10.4)
PO2 TEMP ADJ BLD: 58.8 MMHG (ref 75–100)
POTASSIUM SERPL-SCNC: 4.3 MMOL/L (ref 3.5–5.1)
PROT SERPL-MCNC: 5.5 G/DL (ref 6.4–8.2)
RBC # BLD AUTO: 2.9 X10'6 (ref 4.2–5.6)
RESP RATE 1H: 26 B/MIN
SAO2 % BLDA: 91.5 % (ref 94–97)
SODIUM SERPL-SCNC: 142 MMOL/L (ref 135–145)
SPONT VT COMPRES GAS VOL SET UNCOR VENT: 400 ML
WBC # BLD AUTO: 5.2 X10'3 (ref 4.5–11)

## 2021-11-07 RX ADMIN — Medication PRN MLS/HR: at 20:23

## 2021-11-07 RX ADMIN — CEFEPIME SCH MLS/HR: 1 INJECTION, SOLUTION INTRAVENOUS at 20:23

## 2021-11-07 RX ADMIN — Medication SCH MG: at 09:16

## 2021-11-07 RX ADMIN — METHYLPREDNISOLONE SODIUM SUCCINATE SCH MG: 40 INJECTION, POWDER, FOR SOLUTION INTRAMUSCULAR; INTRAVENOUS at 20:24

## 2021-11-07 RX ADMIN — INSULIN HUMAN SCH UNIT: 100 INJECTION, SOLUTION PARENTERAL at 15:56

## 2021-11-07 RX ADMIN — LEVALBUTEROL SCH MG: 0.63 SOLUTION RESPIRATORY (INHALATION) at 15:05

## 2021-11-07 RX ADMIN — DOCUSATE SODIUM SCH MG: 50 LIQUID ORAL at 20:00

## 2021-11-07 RX ADMIN — LEVALBUTEROL SCH MG: 0.63 SOLUTION RESPIRATORY (INHALATION) at 02:41

## 2021-11-07 RX ADMIN — BUDESONIDE SCH MG: 0.5 INHALANT RESPIRATORY (INHALATION) at 07:12

## 2021-11-07 RX ADMIN — Medication SCH MLS/HR: at 20:23

## 2021-11-07 RX ADMIN — INSULIN HUMAN SCH UNIT: 100 INJECTION, SOLUTION PARENTERAL at 02:51

## 2021-11-07 RX ADMIN — INSULIN HUMAN SCH UNIT: 100 INJECTION, SOLUTION PARENTERAL at 20:41

## 2021-11-07 RX ADMIN — LEVALBUTEROL SCH MG: 0.63 SOLUTION RESPIRATORY (INHALATION) at 20:34

## 2021-11-07 RX ADMIN — ENOXAPARIN SODIUM SCH MG: 100 INJECTION SUBCUTANEOUS at 09:11

## 2021-11-07 RX ADMIN — INSULIN GLARGINE SCH UNIT: 100 INJECTION, SOLUTION SUBCUTANEOUS at 20:40

## 2021-11-07 RX ADMIN — Medication PRN MLS/HR: at 09:10

## 2021-11-07 RX ADMIN — METHYLPREDNISOLONE SODIUM SUCCINATE SCH MG: 40 INJECTION, POWDER, FOR SOLUTION INTRAMUSCULAR; INTRAVENOUS at 09:15

## 2021-11-07 RX ADMIN — INSULIN HUMAN SCH UNIT: 100 INJECTION, SOLUTION PARENTERAL at 09:48

## 2021-11-07 RX ADMIN — SODIUM CHLORIDE SCH MLS/HR: 9 INJECTION INTRAMUSCULAR; INTRAVENOUS; SUBCUTANEOUS at 09:50

## 2021-11-07 RX ADMIN — LEVALBUTEROL SCH MG: 0.63 SOLUTION RESPIRATORY (INHALATION) at 07:12

## 2021-11-07 RX ADMIN — CEFEPIME SCH MLS/HR: 1 INJECTION, SOLUTION INTRAVENOUS at 09:14

## 2021-11-07 RX ADMIN — Medication PRN MLS/HR: at 05:34

## 2021-11-07 RX ADMIN — Medication SCH MLS/HR: at 23:18

## 2021-11-07 RX ADMIN — BUDESONIDE SCH MG: 0.5 INHALANT RESPIRATORY (INHALATION) at 20:34

## 2021-11-07 NOTE — NUR
Reassessment: Pt remains intubated and sedated, tolerating TF at goal rate 

with GRV WNL. Due to national shortage of Vital TF formulas, pt currently 

receiving Pivot 1.5 at 45ml/hr goal, though unable to meet protein needs 

d/t high kcal formula. LBM 11/4 documented with 200mL stool output with a 

rectal tube in place. Recommend continuing with routine bowel care given 

previous constipation. No changes to nutrition recommendations at this 

time. Will continue to follow.

Recommendations:

1) Continuous TF via OGT using Vital High Protein at 65 mL/hr to provide

1560 mL total volume/day, 1560 kcal, 137 g protein, and 1304 mL water;

Adjust if Propofol is resumed

2) Additional 150 mL water flush Q4H; monitor serum Na and adjust as

appropriate

3) IF out of Vital High Protein sub Pivot 1.5 at 45ml/hr providing 1080ml

volume, 1620kcals, 101g protein, 820ml free water. Additional water flush

200ml Q4H

4) Prealbumin q Monday/Thursday; daily scaled weights

5) Routine bowel care; previously constipated 15 days

6) DM education once stable following extubation, A1c 6.8% and no PMH of

DM in EMR. Pt will need official DM dx by physician prior to RD education

-------------------------------------------------------------------------------

Addendum: 11/07/21 at 0740 by Nik Chang RD

-------------------------------------------------------------------------------

Amended: Links added.

## 2021-11-08 VITALS — SYSTOLIC BLOOD PRESSURE: 97 MMHG | DIASTOLIC BLOOD PRESSURE: 59 MMHG

## 2021-11-08 VITALS — DIASTOLIC BLOOD PRESSURE: 76 MMHG | SYSTOLIC BLOOD PRESSURE: 122 MMHG

## 2021-11-08 VITALS — SYSTOLIC BLOOD PRESSURE: 95 MMHG | DIASTOLIC BLOOD PRESSURE: 54 MMHG

## 2021-11-08 VITALS — DIASTOLIC BLOOD PRESSURE: 72 MMHG | SYSTOLIC BLOOD PRESSURE: 119 MMHG

## 2021-11-08 VITALS — SYSTOLIC BLOOD PRESSURE: 120 MMHG | DIASTOLIC BLOOD PRESSURE: 78 MMHG

## 2021-11-08 VITALS — SYSTOLIC BLOOD PRESSURE: 89 MMHG | DIASTOLIC BLOOD PRESSURE: 52 MMHG

## 2021-11-08 VITALS — DIASTOLIC BLOOD PRESSURE: 62 MMHG | SYSTOLIC BLOOD PRESSURE: 116 MMHG

## 2021-11-08 VITALS — DIASTOLIC BLOOD PRESSURE: 72 MMHG | SYSTOLIC BLOOD PRESSURE: 141 MMHG

## 2021-11-08 VITALS — DIASTOLIC BLOOD PRESSURE: 55 MMHG | SYSTOLIC BLOOD PRESSURE: 97 MMHG

## 2021-11-08 VITALS — DIASTOLIC BLOOD PRESSURE: 57 MMHG | SYSTOLIC BLOOD PRESSURE: 105 MMHG

## 2021-11-08 VITALS — SYSTOLIC BLOOD PRESSURE: 105 MMHG | DIASTOLIC BLOOD PRESSURE: 62 MMHG

## 2021-11-08 VITALS — DIASTOLIC BLOOD PRESSURE: 70 MMHG | SYSTOLIC BLOOD PRESSURE: 150 MMHG

## 2021-11-08 VITALS — DIASTOLIC BLOOD PRESSURE: 69 MMHG | SYSTOLIC BLOOD PRESSURE: 138 MMHG

## 2021-11-08 VITALS — SYSTOLIC BLOOD PRESSURE: 95 MMHG | DIASTOLIC BLOOD PRESSURE: 52 MMHG

## 2021-11-08 VITALS — DIASTOLIC BLOOD PRESSURE: 61 MMHG | SYSTOLIC BLOOD PRESSURE: 100 MMHG

## 2021-11-08 VITALS — DIASTOLIC BLOOD PRESSURE: 74 MMHG | SYSTOLIC BLOOD PRESSURE: 140 MMHG

## 2021-11-08 LAB
ALBUMIN SERPL BCP-MCNC: 1.9 G/DL (ref 3.4–5)
ALBUMIN/GLOB SERPL: 0.5 {RATIO} (ref 1.1–1.5)
ALP SERPL-CCNC: 84 IU/L (ref 46–116)
ALT SERPL W P-5'-P-CCNC: 85 U/L (ref 12–78)
ANION GAP SERPL CALCULATED.3IONS-SCNC: 7 MMOL/L (ref 8–16)
ARTERIAL PATENCY WRIST A: POSITIVE
ARTERIAL PATENCY WRIST A: POSITIVE
AST SERPL W P-5'-P-CCNC: 24 U/L (ref 10–37)
BACTERIA URNS QL MICRO: (no result) /HPF
BASE EXCESS BLDA CALC-SCNC: 3.4 MMOL/L (ref -2–2)
BASE EXCESS BLDA CALC-SCNC: 4.4 MMOL/L (ref -2–2)
BASOPHILS # BLD AUTO: 0 X10'3 (ref 0–0.2)
BASOPHILS NFR BLD AUTO: 0.2 % (ref 0–1)
BILIRUB SERPL-MCNC: 0.4 MG/DL (ref 0.1–1)
BODY TEMPERATURE: 37.1
BODY TEMPERATURE: 39.3
BUN SERPL-MCNC: 22 MG/DL (ref 7–18)
BUN/CREAT SERPL: 48.9 (ref 6.6–38)
CALCIUM SERPL-MCNC: 8.7 MG/DL (ref 8.5–10.1)
CHLORIDE SERPL-SCNC: 105 MMOL/L (ref 99–107)
CLARITY UR: CLEAR
CO2 SERPL-SCNC: 30.9 MMOL/L (ref 24–32)
COHGB MFR BLDA: 1 % (ref 0–3.9)
COHGB MFR BLDA: 1.6 % (ref 0–3.9)
COLOR UR: YELLOW
CREAT SERPL-MCNC: 0.45 MG/DL (ref 0.4–0.9)
CRP SERPL HS-MCNC: 3.71 MG/DL (ref 0–0.5)
D DIMER PPP FEU-MCNC: 2.5 MG/L FEU (ref 0–0.5)
DEPRECATED SQUAMOUS URNS QL MICRO: (no result) /LPF
EOSINOPHIL # BLD AUTO: 0 X10'3 (ref 0–0.9)
EOSINOPHIL NFR BLD AUTO: 0.6 % (ref 0–6)
ERYTHROCYTE [DISTWIDTH] IN BLOOD BY AUTOMATED COUNT: 17.4 % (ref 11.5–14.5)
GFR SERPL CREATININE-BSD FRML MDRD: > 90 ML/MIN
GLUCOSE SERPL-MCNC: 120 MG/DL (ref 70–104)
GLUCOSE UR STRIP-MCNC: NEGATIVE MG/DL
HCO3 BLDA-SCNC: 27.7 MMOL/L (ref 22–26)
HCO3 BLDA-SCNC: 29.7 MMOL/L (ref 22–26)
HCT VFR BLD AUTO: 27.9 % (ref 35–45)
HGB BLD-MCNC: 9.5 G/DL (ref 12–16)
HGB BLDA-MCNC: 10.9 G/DL (ref 12–16)
HGB BLDA-MCNC: 12.6 G/DL (ref 12–16)
HGB UR QL STRIP: (no result)
INHALED O2 FLOW RATE: 15 L/MIN
KETONES UR STRIP-MCNC: NEGATIVE MG/DL
LEUKOCYTE ESTERASE UR QL STRIP: NEGATIVE
LYMPHOCYTES # BLD AUTO: 0.3 X10'3 (ref 1.1–4.8)
LYMPHOCYTES NFR BLD AUTO: 5.3 % (ref 21–51)
MAGNESIUM SERPL-MCNC: 2 MG/DL (ref 1.5–2.4)
MCH RBC QN AUTO: 31.5 PG (ref 27–31)
MCHC RBC AUTO-ENTMCNC: 34 G/DL (ref 33–36.5)
MCV RBC AUTO: 92.6 FL (ref 78–98)
METHGB MFR BLDA: 0.1 % (ref 0–1.5)
METHGB MFR BLDA: 0.1 % (ref 0–1.5)
MONOCYTES # BLD AUTO: 0.2 X10'3 (ref 0–0.9)
MONOCYTES NFR BLD AUTO: 3.7 % (ref 2–12)
NEUTROPHILS # BLD AUTO: 5.6 X10'3 (ref 1.8–7.7)
NEUTROPHILS NFR BLD AUTO: 90.2 % (ref 42–75)
NITRITE UR QL STRIP: NEGATIVE
O2/TOTAL GAS SETTING VFR VENT: 100 MMHG/%
O2/TOTAL GAS SETTING VFR VENT: 90 MMHG/%
OXYHGB MFR BLDA: 77.8 % (ref 94–97)
OXYHGB MFR BLDA: 96.7 % (ref 94–97)
PCO2 TEMP ADJ BLDA: 41 MMHG (ref 32–45)
PCO2 TEMP ADJ BLDA: 51.9 MMHG (ref 32–45)
PEEP RESPIRATORY: 18 CM H2O
PH UR STRIP: 7.5 [PH] (ref 4.8–8)
PLATELET # BLD AUTO: 259 X10'3 (ref 140–440)
PMV BLD AUTO: 8.9 FL (ref 7.4–10.4)
PO2 TEMP ADJ BLD: 103.7 MMHG (ref 75–100)
PO2 TEMP ADJ BLD: 51.4 MMHG (ref 75–100)
POTASSIUM SERPL-SCNC: 4 MMOL/L (ref 3.5–5.1)
PREALB SERPL-MCNC: 26.3 MG/DL (ref 19–36)
PROT SERPL-MCNC: 5.9 G/DL (ref 6.4–8.2)
PROT UR QL STRIP: NEGATIVE MG/DL
RBC # BLD AUTO: 3.01 X10'6 (ref 4.2–5.6)
RBC #/AREA URNS HPF: (no result) /HPF (ref 0–2)
RESP RATE 1H: 26 B/MIN
SAO2 % BLDA: 79.1 % (ref 94–97)
SAO2 % BLDA: 97.8 % (ref 94–97)
SODIUM SERPL-SCNC: 143 MMOL/L (ref 135–145)
SP GR UR STRIP: 1.02 (ref 1–1.03)
SPONT VT COMPRES GAS VOL SET UNCOR VENT: 400 ML
URN COLLECT METHOD CLASS: (no result)
UROBILINOGEN UR STRIP-MCNC: 0.2 E.U/DL (ref 0.2–1)
WBC # BLD AUTO: 6.2 X10'3 (ref 4.5–11)
WBC #/AREA URNS HPF: (no result) /HPF (ref 0–4)

## 2021-11-08 RX ADMIN — CEFEPIME SCH MLS/HR: 1 INJECTION, SOLUTION INTRAVENOUS at 08:46

## 2021-11-08 RX ADMIN — LEVALBUTEROL SCH MG: 0.63 SOLUTION RESPIRATORY (INHALATION) at 07:17

## 2021-11-08 RX ADMIN — Medication PRN MLS/HR: at 14:21

## 2021-11-08 RX ADMIN — BUDESONIDE SCH MG: 0.5 INHALANT RESPIRATORY (INHALATION) at 07:17

## 2021-11-08 RX ADMIN — DOCUSATE SODIUM SCH MG: 50 LIQUID ORAL at 08:04

## 2021-11-08 RX ADMIN — SODIUM CHLORIDE SCH MLS/HR: 9 INJECTION INTRAMUSCULAR; INTRAVENOUS; SUBCUTANEOUS at 07:55

## 2021-11-08 RX ADMIN — ENOXAPARIN SODIUM SCH MG: 100 INJECTION SUBCUTANEOUS at 07:58

## 2021-11-08 RX ADMIN — METHYLPREDNISOLONE SODIUM SUCCINATE SCH MG: 40 INJECTION, POWDER, FOR SOLUTION INTRAMUSCULAR; INTRAVENOUS at 07:57

## 2021-11-08 RX ADMIN — INSULIN HUMAN SCH UNIT: 100 INJECTION, SOLUTION PARENTERAL at 07:54

## 2021-11-08 RX ADMIN — Medication SCH MLS/HR: at 14:22

## 2021-11-08 RX ADMIN — Medication SCH MLS/HR: at 08:05

## 2021-11-08 RX ADMIN — LEVALBUTEROL SCH MG: 0.63 SOLUTION RESPIRATORY (INHALATION) at 02:45

## 2021-11-08 RX ADMIN — INSULIN HUMAN SCH UNIT: 100 INJECTION, SOLUTION PARENTERAL at 02:24

## 2021-11-08 RX ADMIN — Medication SCH MG: at 07:57

## 2021-11-08 NOTE — NUR
Sister Katia to come see pt. and talk with Dr. Dietrich. CXR obtained. UA obtained. Phleb. 
attempted to obtain BC without success. Spo2 remains in the 60s-70s.

## 2021-11-08 NOTE — NUR
Pt. desatted to 70s. Being bagged per RT. Charge and Dr. Dietrich aware. Charge called pt's 
sister. Awaiting a call back. Will continue to bag pt. until family calls back with their 
wishes as pt. is not recovering her SP02.

## 2021-11-08 NOTE — NUR
F/u: Pt tolerating TF now at 60ml/hr using Pivot per EMR; RD notified MD of new TF recs 
given national shortage of Vital High Protein. Unable to meet protein needs but able to meet 
kcal needs at this time given estimated needs. Updated recs below. Will monitor for TF 
tolerance.

Recommendations:

1) Continuous TF via OGT using Vital High Protein at 65 mL/hr to provide

1560 mL total volume/day, 1560 kcal, 137 g protein, and 1304 mL water;

Adjust if Propofol is resumed

2) Additional 150 mL water flush Q4H; monitor serum Na and adjust as

appropriate

3) IF out of Vital High Protein sub Pivot 1.5 at 45ml/hr providing 1080ml

volume, 1620kcals, 101g protein, 820ml free water. Additional water flush

200ml Q4H

4) Prealbumin q Monday/Thursday; daily scaled weights

5) Routine bowel care; previously constipated 15 days

6) DM education once stable following extubation, A1c 6.8% and no PMH of

DM in EMR. Pt will need official DM dx by physician prior to RD education

-------------------------------------------------------------------------------

Addendum: 11/08/21 at 1430 by Billy Andujar RD

-------------------------------------------------------------------------------

Amended: Links added. 06-Oct-2021